# Patient Record
Sex: MALE | Race: WHITE | NOT HISPANIC OR LATINO | URBAN - METROPOLITAN AREA
[De-identification: names, ages, dates, MRNs, and addresses within clinical notes are randomized per-mention and may not be internally consistent; named-entity substitution may affect disease eponyms.]

---
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
## 2019-03-30 ENCOUNTER — TRANSCRIPTION ENCOUNTER (OUTPATIENT)
Age: 19
End: 2019-03-30

## 2019-03-30 ENCOUNTER — INPATIENT (INPATIENT)
Facility: HOSPITAL | Age: 19
LOS: 0 days | Discharge: ANOTHER HOSPITAL | End: 2019-03-30
Attending: STUDENT IN AN ORGANIZED HEALTH CARE EDUCATION/TRAINING PROGRAM | Admitting: STUDENT IN AN ORGANIZED HEALTH CARE EDUCATION/TRAINING PROGRAM

## 2019-03-30 VITALS
HEART RATE: 104 BPM | OXYGEN SATURATION: 95 % | SYSTOLIC BLOOD PRESSURE: 127 MMHG | RESPIRATION RATE: 18 BRPM | DIASTOLIC BLOOD PRESSURE: 77 MMHG

## 2019-03-30 VITALS — TEMPERATURE: 100 F

## 2019-03-30 DIAGNOSIS — R50.9 FEVER, UNSPECIFIED: ICD-10-CM

## 2019-03-30 DIAGNOSIS — Q21.2 ATRIOVENTRICULAR SEPTAL DEFECT: Chronic | ICD-10-CM

## 2019-03-30 DIAGNOSIS — Z95.2 PRESENCE OF PROSTHETIC HEART VALVE: Chronic | ICD-10-CM

## 2019-03-30 LAB
ALBUMIN SERPL ELPH-MCNC: 4.1 G/DL — SIGNIFICANT CHANGE UP (ref 3.5–5.2)
ALP SERPL-CCNC: 85 U/L — SIGNIFICANT CHANGE UP (ref 30–115)
ALT FLD-CCNC: 13 U/L — SIGNIFICANT CHANGE UP (ref 13–38)
ANION GAP SERPL CALC-SCNC: 15 MMOL/L — HIGH (ref 7–14)
AST SERPL-CCNC: 23 U/L — SIGNIFICANT CHANGE UP (ref 13–38)
BASOPHILS # BLD AUTO: 0.07 K/UL — SIGNIFICANT CHANGE UP (ref 0–0.2)
BASOPHILS NFR BLD AUTO: 0.3 % — SIGNIFICANT CHANGE UP (ref 0–1)
BILIRUB SERPL-MCNC: 1.2 MG/DL — SIGNIFICANT CHANGE UP (ref 0.2–1.2)
BUN SERPL-MCNC: 17 MG/DL — SIGNIFICANT CHANGE UP (ref 10–20)
CALCIUM SERPL-MCNC: 8.9 MG/DL — SIGNIFICANT CHANGE UP (ref 8.5–10.1)
CHLORIDE SERPL-SCNC: 101 MMOL/L — SIGNIFICANT CHANGE UP (ref 98–110)
CO2 SERPL-SCNC: 25 MMOL/L — SIGNIFICANT CHANGE UP (ref 17–32)
CREAT SERPL-MCNC: 1 MG/DL — SIGNIFICANT CHANGE UP (ref 0.7–1.5)
EOSINOPHIL # BLD AUTO: 0 K/UL — SIGNIFICANT CHANGE UP (ref 0–0.7)
EOSINOPHIL NFR BLD AUTO: 0 % — SIGNIFICANT CHANGE UP (ref 0–8)
ERYTHROCYTE [SEDIMENTATION RATE] IN BLOOD: 14 MM/HR — HIGH (ref 0–10)
FLU A RESULT: NEGATIVE — SIGNIFICANT CHANGE UP
FLU A RESULT: NEGATIVE — SIGNIFICANT CHANGE UP
FLUAV AG NPH QL: NEGATIVE — SIGNIFICANT CHANGE UP
FLUBV AG NPH QL: NEGATIVE — SIGNIFICANT CHANGE UP
GLUCOSE SERPL-MCNC: 100 MG/DL — HIGH (ref 70–99)
HCT VFR BLD CALC: 44.9 % — SIGNIFICANT CHANGE UP (ref 42–52)
HGB BLD-MCNC: 15.4 G/DL — SIGNIFICANT CHANGE UP (ref 14–18)
IMM GRANULOCYTES NFR BLD AUTO: 1.4 % — HIGH (ref 0.1–0.3)
LACTATE SERPL-SCNC: 1.8 MMOL/L — SIGNIFICANT CHANGE UP (ref 0.5–2.2)
LYMPHOCYTES # BLD AUTO: 0.85 K/UL — LOW (ref 1.2–3.4)
LYMPHOCYTES # BLD AUTO: 3.9 % — LOW (ref 20.5–51.1)
MCHC RBC-ENTMCNC: 31.9 PG — HIGH (ref 27–31)
MCHC RBC-ENTMCNC: 34.3 G/DL — SIGNIFICANT CHANGE UP (ref 32–37)
MCV RBC AUTO: 93 FL — SIGNIFICANT CHANGE UP (ref 80–94)
MONOCYTES # BLD AUTO: 1.22 K/UL — HIGH (ref 0.1–0.6)
MONOCYTES NFR BLD AUTO: 5.6 % — SIGNIFICANT CHANGE UP (ref 1.7–9.3)
NEUTROPHILS # BLD AUTO: 19.33 K/UL — HIGH (ref 1.4–6.5)
NEUTROPHILS NFR BLD AUTO: 88.8 % — HIGH (ref 42.2–75.2)
NRBC # BLD: 0 /100 WBCS — SIGNIFICANT CHANGE UP (ref 0–0)
PLATELET # BLD AUTO: 207 K/UL — SIGNIFICANT CHANGE UP (ref 130–400)
POTASSIUM SERPL-MCNC: 4.3 MMOL/L — SIGNIFICANT CHANGE UP (ref 3.5–5)
POTASSIUM SERPL-SCNC: 4.3 MMOL/L — SIGNIFICANT CHANGE UP (ref 3.5–5)
PROT SERPL-MCNC: 7.2 G/DL — SIGNIFICANT CHANGE UP (ref 6–8)
RBC # BLD: 4.83 M/UL — SIGNIFICANT CHANGE UP (ref 4.7–6.1)
RBC # FLD: 13.6 % — SIGNIFICANT CHANGE UP (ref 11.5–14.5)
RSV RESULT: NEGATIVE — SIGNIFICANT CHANGE UP
RSV RNA RESP QL NAA+PROBE: NEGATIVE — SIGNIFICANT CHANGE UP
SODIUM SERPL-SCNC: 141 MMOL/L — SIGNIFICANT CHANGE UP (ref 135–146)
WBC # BLD: 21.78 K/UL — HIGH (ref 4.8–10.8)
WBC # FLD AUTO: 21.78 K/UL — HIGH (ref 4.8–10.8)

## 2019-03-30 RX ORDER — VANCOMYCIN HCL 1 G
1055 VIAL (EA) INTRAVENOUS EVERY 8 HOURS
Qty: 0 | Refills: 0 | Status: DISCONTINUED | OUTPATIENT
Start: 2019-03-30 | End: 2019-03-30

## 2019-03-30 RX ORDER — MIDAZOLAM HYDROCHLORIDE 1 MG/ML
5 INJECTION, SOLUTION INTRAMUSCULAR; INTRAVENOUS ONCE
Qty: 0 | Refills: 0 | Status: DISCONTINUED | OUTPATIENT
Start: 2019-03-30 | End: 2019-03-30

## 2019-03-30 RX ORDER — GENTAMICIN SULFATE 40 MG/ML
210 VIAL (ML) INJECTION ONCE
Qty: 0 | Refills: 0 | Status: COMPLETED | OUTPATIENT
Start: 2019-03-30 | End: 2019-03-30

## 2019-03-30 RX ORDER — CIPROFLOXACIN LACTATE 400MG/40ML
400 VIAL (ML) INTRAVENOUS EVERY 12 HOURS
Qty: 0 | Refills: 0 | Status: DISCONTINUED | OUTPATIENT
Start: 2019-03-30 | End: 2019-03-30

## 2019-03-30 RX ORDER — SERTRALINE 25 MG/1
0 TABLET, FILM COATED ORAL
Qty: 0 | Refills: 0 | COMMUNITY

## 2019-03-30 RX ORDER — VANCOMYCIN HCL 1 G
2000 VIAL (EA) INTRAVENOUS ONCE
Qty: 0 | Refills: 0 | Status: COMPLETED | OUTPATIENT
Start: 2019-03-30 | End: 2019-03-30

## 2019-03-30 RX ADMIN — Medication 250 MILLIGRAM(S): at 10:40

## 2019-03-30 RX ADMIN — Medication 500 MILLIGRAM(S): at 13:01

## 2019-03-30 RX ADMIN — MIDAZOLAM HYDROCHLORIDE 5 MILLIGRAM(S): 1 INJECTION, SOLUTION INTRAMUSCULAR; INTRAVENOUS at 08:50

## 2019-03-30 RX ADMIN — Medication 200 MILLIGRAM(S): at 09:37

## 2019-03-30 NOTE — DISCHARGE NOTE NURSING/CASE MANAGEMENT/SOCIAL WORK - NSDCDPATPORTLINK_GEN_ALL_CORE
You can access the [facility] Patient Portal, offered by [facility], by registering with the following website: [email or web address]

## 2019-03-30 NOTE — DISCHARGE NOTE PROVIDER - NSDCCPCAREPLAN_GEN_ALL_CORE_FT
PRINCIPAL DISCHARGE DIAGNOSIS  Diagnosis: Fever  Assessment and Plan of Treatment: - Blood cultures have been drawn. Please call [phone] (external lab) for his results. You will need to provide his MRN or account number from [facility].  - Continue antibiotic management of fevers for r/o endocarditis  - His home medications include Coumadin 5mG, Synthroid 75mcG, Zoloft 50mG, and clobetasol topical.  - Please call [phone] if you have questions about his management while at [facility]      SECONDARY DISCHARGE DIAGNOSES  Diagnosis: Cough  Assessment and Plan of Treatment: - RVP was taken and pending. Please call [phone] if you would like to know the results

## 2019-03-30 NOTE — ED PROVIDER NOTE - CLINICAL SUMMARY MEDICAL DECISION MAKING FREE TEXT BOX
Patient to be admitted due to concern for endocarditis.  Inpatient team to follow cultures, bloodwork. Pediatrician defers to hospitalist here.  Patient to be admitted to an inpatient floor. Case discussed with and care endorsed to medical admitting resident. Admitting physician notified.

## 2019-03-30 NOTE — DISCHARGE NOTE PROVIDER - PROVIDER TOKENS
FREE:[LAST:[Helavi],FIRST:[Lee Ann],PHONE:[phone]],FAX:[(   )    -],ADDRESS:[[address] [place] [address]]],FREE:[LAST:[Ector],FIRST:[Fidel],PHONE:[phone]:[(   )    -],ADDRESS:[[address] [place] [address]]]

## 2019-03-30 NOTE — CHART NOTE - NSCHARTNOTEFT_GEN_A_CORE
HPI:  19 year old male with PMH of Trisomy 21, mitral regurgitation, hypothyroidism, eczema, psoriasis, alopecia, and AV cushion defect with AV reconstruction and mitral valve replacement in , presents with fever, dry cough and congestion x 2 days. As per mom, he came home unwell yesterday at 13:00 with congestion and yellow-green discharge. She took his temperature via ear which was 100.8, which resolved with Tylenol. Pt. was febrile to 102.8 that evening and was sweating with a dry cough. He had significantly laboured breathing which his mom states looked similar to his breathing while he was having cardiac problems during reconstruction. The fever was not resolving with Tylenol. He has had decreased PO to food, but is drinking well. No rash apart from psoriatic plaques. No vomiting, no changes in urination. Reports one episode of loose stool. Positive sick contacts. No recent travel.       PMHx: Down's Syndrome, mitral regurgitation, AV cushion defect, hypothyroid, psoriasis, alopecia, eczema  PSHx: AV reconstruction x 3, mitral valve replacement  Meds: Coumadin, Zyrtec, Synthroid  All: none  FHx: mother has mitral valve prolapse, father has HTN  SHx: mother, father, sister, 4 pugs, no smokers  BHx: FT, , NICU x 5d, was intubated  Dev: Delayed  Vacc: UTD with flu  PMD: Dr. Lee Ann Becerra      VITAL SIGNS   T(C): 37.8 (19 @ 14:16), Max: 37.9 (19 @ 12:53)  HR: 103 (19 @ 11:45) (103 - 104)  BP: 114/55 (19 @ 11:45) (114/55 - 127/77)  RR: 24 (19 @ 11:45) (18 - 24)  SpO2: 97% (19 @ 11:45) (95% - 97%)      PHYSICAL EXAM:  Weight (kg): 70.3 ( @ 06:49)  General: in no acute distress   Eyes: EOM intact; conjunctiva and sclera clear  Head: Normocephalic; atraumatic  ENMT: External ear normal, tympanic membranes intact, no nasal discharge; airway clear, oropharynx clear, tonsils  Neck: Supple; non tender; No cervical adenopathy  Respiratory: normal respiratory pattern, clear to auscultation bilaterally, no signs of increased work of breathing  Cardiovascular: Regular rate and rhythm. S1 and S2 Normal; No murmurs  Abdominal: Soft non-tender non-distended; normal bowel sounds; no hepatosplenomegaly; no masses  Extremities: Full range of motion, no tenderness, no cyanosis or edema  Neurological: Grossly intact  Skin: Warm and dry. No acute rash      LABS:                        15.4   21.78 )-----------( 207      ( 30 Mar 2019 09:05 )             44.9           141  |  101  |  17  ----------------------------<  100<H>  4.3   |  25  |  1.0    Ca    8.9      30 Mar 2019 09:05    TPro  7.2  /  Alb  4.1  /  TBili  1.2  /  DBili  x   /  AST  23  /  ALT  13  /  AlkPhos  85      Cultures:   blood cx x 3 pending         RADIOLOGY & ADDITIONAL STUDIES:    < from: Xray Chest 2 Views PA/Lat (19 @ 07:43) >    Impression:      Bilateral perihilar opacity. No pleural effusion or air leak    < end of copied text >        A&P:    19 year old male with PMH of Trisomy 21, mitral regurgitation, hypothyroidism, eczema, psoriasis, alopecia, and AV cushion defect with AV reconstruction and mitral valve replacement in , presents with fever, dry cough and congestion x 2 days. Admitted for r/o endocarditits    PLAN    RESP:  room air     CARDIO:  cardio consult   echo     FENGI:  regular diet     ID:  ID consult   gentamycin  vancomycin   ciprofloxacin   Tylenol for fever, PRN

## 2019-03-30 NOTE — ED PEDIATRIC NURSE NOTE - OBJECTIVE STATEMENT
Pt presents to ED w/ a fever (tmax 102 @ home) and nasal congestion. Pt has hx of open heart surgery as a child. Pt is well appearing. Mom gave tylenol at 0430. Denies cough/earache/sore throat.

## 2019-03-30 NOTE — ED PROVIDER NOTE - ATTENDING CONTRIBUTION TO CARE
in brief, pertinent + - :  19 y M pmh MR, mitral valve replacement on coumadin, hypothyroidism, psoriasis, alopecia, cc fever x 1 day associated with copious rhinorrhea and intermittent dry cough   of note, pt had a URI type illness > 3 weeks ago pt was placed on amoxicillin that he finished; now with fever again but did not have fever or URI in interim (resolved URI, only lasted a few days)  no skin changes to hands or feet  ros: see resident note for additional to pertinent associated symptoms listed above  past med and surg: none pertinent  Fh: none pertinent  Soc: none pertinent, see resident note    Vital Signs: I have reviewed the initial vital signs, slightly tachycardic  Constitutional: well-nourished, appears stated age, no acute distress  HEENT: throat with no exudates, non erythematous, dry clear discharge both nares, moist mucous membranes, normal TMs visualized but with dry wax occluding partial view in both TMs  Cardiovascular: regular rate, systolic click, regular rhythm, well-perfused extremities  Respiratory: unlabored respiratory effort, clear to auscultation bilaterally, scattered rhonci  Gastrointestinal: soft, non-tender abdomen, no palpable organomegaly  Musculoskeletal: supple neck, no gross deformities  Integumentary: warm, dry, no rash; no signs of osler nodes or nail changes, or other skin changes/new rashes to hands or feet   Neurologic: awake, alert, normal tone, moving all extremities   A/P:  I have considered many differentials for this case, including viral URI, pneumonia chief concerns  considered endocarditis but fever x 1 day, with source, no murmur, no skin changes  will speak to pediatrician, since follows patient closely  plan for probable discharge with strict return precautions for parents re: endocarditis (fever not resolving, patient worsening) in brief, pertinent + - :  19 y M pmh MR, mitral valve replacement on coumadin, hypothyroidism, psoriasis, alopecia, cc fever x 1 day associated with copious rhinorrhea and intermittent dry cough   of note, pt had a URI type illness > 3 weeks ago pt was placed on amoxicillin that he finished; now with fever again but did not have fever or URI in interim (resolved URI, only lasted a few days)  no skin changes to hands or feet  ros: see resident note for additional to pertinent associated symptoms listed above  past med and surg: none pertinent  Fh: none pertinent  Soc: none pertinent, see resident note    Vital Signs: I have reviewed the initial vital signs, slightly tachycardic  Constitutional: well-nourished, appears stated age, no acute distress  HEENT: throat with no exudates, non erythematous, dry clear discharge both nares, moist mucous membranes, normal TMs visualized but with dry wax occluding partial view in both TMs  Cardiovascular: regular rate, systolic click, regular rhythm, well-perfused extremities  Respiratory: unlabored respiratory effort, clear to auscultation bilaterally, scattered rhonci  Gastrointestinal: soft, non-tender abdomen, no palpable organomegaly  Musculoskeletal: supple neck, no gross deformities  Integumentary: warm, dry, no rash; no signs of osler nodes or [place], or nail changes, or other skin changes/new rashes to hands or feet   Neurologic: awake, alert, normal tone, moving all extremities   A/P:  I have considered many differentials for this case, including viral URI, pneumonia chief concerns  considered endocarditis but fever x 1 day, with source, no murmur, no skin changes  will get CXR, re assess  viral swabs  speak to pediatrician

## 2019-03-30 NOTE — H&P PEDIATRIC - NSICDXPASTSURGICALHX_GEN_ALL_CORE_FT
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

## 2019-03-30 NOTE — DISCHARGE NOTE NURSING/CASE MANAGEMENT/SOCIAL WORK - NSDCPEPTCOWAR_GEN_ALL_CORE
Coumadin/Warfarin - Follow up monitoring/Coumadin/Warfarin - Potential for adverse drug reactions and interactions/Coumadin/Warfarin - Compliance/Coumadin/Warfarin - Dietary Advice

## 2019-03-30 NOTE — ED PEDIATRIC NURSE NOTE - NSIMPLEMENTINTERV_GEN_ALL_ED
Implemented All Universal Safety Interventions:  [place] to call system. Call bell, personal items and telephone within reach. Instruct patient to call for assistance. Room bathroom lighting operational. Non-slip footwear when patient is off stretcher. Physically safe environment: no spills, clutter or unnecessary equipment. Stretcher in lowest position, wheels locked, appropriate side rails in place.

## 2019-03-30 NOTE — ED PROVIDER NOTE - PHYSICAL EXAMINATION
CONSTITUTIONAL: WA / WN / NAD  HEAD: NCAT  EYES: PERRL ;conjunctivae without injection, drainage or discharge  ENT: Normal pharynx; mucous membranes pink/moist, no erythema.Tympanic membranes pearly gray with normal landmarks;+ yellow mucous from the nose; mouth moist without ulcerations or lesions; throat moist without erythema, exudate, ulcerations or lesions  NECK: Supple; no meningeal signs  CARD: RRR; nl S1/S2;   RESP: Respiratory rate and effort are normal; breath sounds clear and equal bilaterally.  ABD: Soft, NT ND   MSK/EXT: No gross deformities; full range of motion.  SKIN: normal skin color for age and race, well-perfused; warm and dry

## 2019-03-30 NOTE — ED PROVIDER NOTE - OBJECTIVE STATEMENT
19 year old male with a pmh of psoriasis (no longer on immunomodulators since 1 month), hypothyroid, alopecia, h/o 4 open heart surgeries last one was in 2004 in which he had an artifical mitral valve placed presents here for fevers since yesterday tmax of 102. Last given tynenol @ 4:30am. Fever associated with nasal congestion and cough. No headache, n/v/abdominal pain, throat pain or diarrhea. Patient tolerating po.

## 2019-03-30 NOTE — ED PROVIDER NOTE - NS ED ROS FT
Constitutional: See HPI  Eyes: No drainage from eyes.  ENT: + nasal congestion    Neck: No neck stiffness.  Cardiovascular: No Chest pain  Pulmonary: + cough  Abdominal: No nausea, vomiting, diarrhea or abdominal pain.  MS: No myalgia, muscle weakness, joint pain or back pain.  Skin:  h/o psoriasis

## 2019-03-30 NOTE — DISCHARGE NOTE PROVIDER - HOSPITAL COURSE
HPI: Howie is a 19 year old male with past medical history of Down's syndrome, mitral regurgitation, hypothyroidism, eczema, psoriasis, alopecia, and AV cushion defect with AV reconstruction and mitral valve replacement. Per mother, he came home unwell yesterday at 13:00 with congestion and yellow-green discharge. She took his temperature via ear which was 100.8, which resolved with Tylenol. That night, he was febrile to 102.8 and was sweating with a dry cough. He had significantly laboured breathing which his mother likened to his breathing while he was having cardiac problems during reconstruction. The fever was not resolving with Tylenol. He has had decreased PO to food, but is drinking well. One episode of loose stool. No rash apart from psoriatic plaques, no changes in urination. Many sick contacts as school. No recent travel.        PMHx: Down's Syndrome, mitral regurgitation, AV cushion defect, hypothyroid, psoriasis, alopecia, eczema    PSHx: AV reconstruction x 3, mitral valve replacement    Meds: Coumadin 5mG, Zyrtec, Synthroid 75mcG, Zoloft 50mG, Topical clobetasol 0.5%    All: none    FHx: mother has mitral valve prolapse, father has HTN    SHx: mother, father, sister, 4 pugs, no smokers    BHx: FT, , NICU x 5d, was intubated    Dev: Delayed    Vacc: UTD with flu    PMD: Dr. Lee Ann Becerra        ED Course: Blood cultures x 2, CRP, Throat Cx, RVP and rapid flu, CBC, CMP, lactate, ESR, CXR, ECG        Vitals:    T(C): 37.8 (30 Mar 2019 14:16), Max: 37.9 (30 Mar 2019 12:53)    T(F): 100 (30 Mar 2019 14:16), Max: 100.2 (30 Mar 2019 12:53)    HR: 103 (30 Mar 2019 11:45) (103 - 104)    BP: 114/55 (30 Mar 2019 11:45) (114/55 - 127/77)    RR: 24 (30 Mar 2019 11:45) (18 - 24)    SpO2: 97% (30 Mar 2019 11:45) (95% - 97%)        Hospital Course: While admitted, Howie received gentamicin x 1, vancomycin x 1, and ciprofloxacin x 1. He was kept on his home medications. Due to his extensive cardiac history with onset of febrile illness, cardiac origin cannot be ruled out. Transfer to [facility] was initiated in order to obtain care in a facility where he is known with in-house cardiac care. Transfer was accepted by Dr. Cyndee Jameson at [facility] to their PICU.        Lab Results:    FLU A B RSV Detection by PCR (19 @ 09:15)    	  Flu A Result: Negative:    	  Flu B Result: Negative:    	  RSV Result: Negative:         	Lactate, Blood (19 @ 09:05)    	  Lactate, Blood: 1.8 mmol/L        	Sedimentation Rate, Erythrocyte (19 @ 09:05)    	  Sedimentation Rate, Erythrocyte: 14 mm/Hr        	Comprehensive Metabolic Panel (19 @ 09:05)    	  Sodium, Serum: 141 mmol/L    	  Potassium, Serum: 4.3: Slighty Hemolyzed use with Caution mmol/L    	  Chloride, Serum: 101 mmol/L    	  Carbon Dioxide, Serum: 25 mmol/L    	  Anion Gap, Serum: 15 mmol/L    	  Blood Urea Nitrogen, Serum: 17 mg/dL    	  Creatinine, Serum: 1.0 mg/dL    	  Glucose, Serum: 100 mg/dL    	  Calcium, Total Serum: 8.9 mg/dL    	  Protein Total, Serum: 7.2 g/dL    	  Albumin, Serum: 4.1 g/dL    	  Bilirubin Total, Serum: 1.2 mg/dL    	  Alkaline Phosphatase, Serum: 85 U/L    	  Aspartate Aminotransferase (AST/SGOT): 23: Hemolyzed. Interpret with caution U/L    	  Alanine Aminotransferase (ALT/SGPT): 13 U/L    	  eGFR if Non : 108:        	Complete Blood Count + Automated Diff (19 @ 09:05)    	  WBC Count: 21.78 K/uL    	  RBC Count: 4.83 M/uL    	  Hemoglobin: 15.4 g/dL    	  Hematocrit: 44.9 %    	  Mean Cell Volume: 93.0 fL    	  Mean Cell Hemoglobin: 31.9 pg    	  Mean Cell Hemoglobin Conc: 34.3 g/dL    	  Red Cell Distrib Width: 13.6 %    	  Platelet Count - Automated: 207 K/uL    	  Auto Neutrophil #: 19.33 K/uL    	  Auto Lymphocyte #: 0.85 K/uL    	  Auto Monocyte #: 1.22 K/uL    	  Auto Eosinophil #: 0.00 K/uL    	  Auto Basophil #: 0.07 K/uL    	  Auto Neutrophil %: 88.8: Differential percentages must be correlated with absolute numbers for clinical significance. %    	  Auto Lymphocyte %: 3.9 %    	  Auto Monocyte %: 5.6 %    	  Auto Eosinophil %: 0.0 %    	  Auto Basophil %: 0.3 %    	  Auto Immature Granulocyte %: 1.4 %    	  Nucleated RBC: 0 /100 WBCs        	< from: Xray Chest 2 Views PA/Lat (19 @ 07:43) >    	Bilateral perihilar opacity. No pleural effusion or air leak    < end of copied text >        Pending Results:    Blood culture x 2 - call [phone]    RVP - call [phone]    Throat culture for Group A Strep - call [phone]    CRP HPI: Howie is a 19 year old male with past medical history of Down's syndrome, mitral regurgitation, hypothyroidism, eczema, psoriasis, alopecia, and AV cushion defect with AV reconstruction and mitral valve replacement. Per mother, he came home unwell yesterday at 13:00 with congestion and yellow-green discharge. She took his temperature via ear which was 100.8, which resolved with Tylenol. That night, he was febrile to 102.8 and was sweating with a dry cough. He had significantly laboured breathing which his mother likened to his breathing while he was having cardiac problems during reconstruction. The fever was not resolving with Tylenol. He has had decreased PO to food, but is drinking well. One episode of loose stool. No rash apart from psoriatic plaques, no changes in urination. Many sick contacts as school. No recent travel.        PMHx: Down's Syndrome, mitral regurgitation, AV cushion defect, hypothyroid, psoriasis, alopecia, eczema    PSHx: AV reconstruction x 3, mitral valve replacement    Meds: Coumadin 5mG, Zyrtec, Synthroid 75mcG, Zoloft 50mG, Topical clobetasol 0.5%    All: none    FHx: mother has mitral valve prolapse, father has HTN    SHx: mother, father, sister, 4 pugs, no smokers    BHx: FT, , NICU x 5d, was intubated    Dev: Delayed    Vacc: UTD with flu    PMD: Dr. Lee Ann Becerra        ED Course: Blood cultures x 2, CRP, Throat Cx, RVP and rapid flu, CBC, CMP, lactate, ESR, CXR, ECG        Vitals:    T(C): 37.8 (30 Mar 2019 14:16), Max: 37.9 (30 Mar 2019 12:53)    T(F): 100 (30 Mar 2019 14:16), Max: 100.2 (30 Mar 2019 12:53)    HR: 103 (30 Mar 2019 11:45) (103 - 104)    BP: 114/55 (30 Mar 2019 11:45) (114/55 - 127/77)    RR: 24 (30 Mar 2019 11:45) (18 - 24)    SpO2: 97% (30 Mar 2019 11:45) (95% - 97%)        Hospital Course: While admitted, Howie received gentamicin x 1, vancomycin x 1, and ciprofloxacin x 1. He was kept on his home medications. Due to his extensive cardiac history with onset of febrile illness, cardiac origin cannot be ruled out. Transfer via ambulance to [facility] was initiated in order to obtain care in a facility where he is known with in-house cardiac care. Transfer was accepted by Dr. Cyndee Jamesno at [facility] to their PICU.        Lab Results:    FLU A B RSV Detection by PCR (19 @ 09:15)    	  Flu A Result: Negative:    	  Flu B Result: Negative:    	  RSV Result: Negative:         	Lactate, Blood (19 @ 09:05)    	  Lactate, Blood: 1.8 mmol/L        	Sedimentation Rate, Erythrocyte (19 @ 09:05)    	  Sedimentation Rate, Erythrocyte: 14 mm/Hr        	Comprehensive Metabolic Panel (19 @ 09:05)    	  Sodium, Serum: 141 mmol/L    	  Potassium, Serum: 4.3: Slighty Hemolyzed use with Caution mmol/L    	  Chloride, Serum: 101 mmol/L    	  Carbon Dioxide, Serum: 25 mmol/L    	  Anion Gap, Serum: 15 mmol/L    	  Blood Urea Nitrogen, Serum: 17 mg/dL    	  Creatinine, Serum: 1.0 mg/dL    	  Glucose, Serum: 100 mg/dL    	  Calcium, Total Serum: 8.9 mg/dL    	  Protein Total, Serum: 7.2 g/dL    	  Albumin, Serum: 4.1 g/dL    	  Bilirubin Total, Serum: 1.2 mg/dL    	  Alkaline Phosphatase, Serum: 85 U/L    	  Aspartate Aminotransferase (AST/SGOT): 23: Hemolyzed. Interpret with caution U/L    	  Alanine Aminotransferase (ALT/SGPT): 13 U/L    	  eGFR if Non : 108:        	Complete Blood Count + Automated Diff (19 @ 09:05)    	  WBC Count: 21.78 K/uL    	  RBC Count: 4.83 M/uL    	  Hemoglobin: 15.4 g/dL    	  Hematocrit: 44.9 %    	  Mean Cell Volume: 93.0 fL    	  Mean Cell Hemoglobin: 31.9 pg    	  Mean Cell Hemoglobin Conc: 34.3 g/dL    	  Red Cell Distrib Width: 13.6 %    	  Platelet Count - Automated: 207 K/uL    	  Auto Neutrophil #: 19.33 K/uL    	  Auto Lymphocyte #: 0.85 K/uL    	  Auto Monocyte #: 1.22 K/uL    	  Auto Eosinophil #: 0.00 K/uL    	  Auto Basophil #: 0.07 K/uL    	  Auto Neutrophil %: 88.8: Differential percentages must be correlated with absolute numbers for clinical significance. %    	  Auto Lymphocyte %: 3.9 %    	  Auto Monocyte %: 5.6 %    	  Auto Eosinophil %: 0.0 %    	  Auto Basophil %: 0.3 %    	  Auto Immature Granulocyte %: 1.4 %    	  Nucleated RBC: 0 /100 WBCs        	< from: Xray Chest 2 Views PA/Lat (19 @ 07:43) >    	Bilateral perihilar opacity. No pleural effusion or air leak    < end of copied text >        Pending Results:    Blood culture x 2 - call [phone]    RVP - call [phone]    Throat culture for Group A Strep - call [phone]    CRP

## 2019-03-30 NOTE — H&P PEDIATRIC - NSHPREVIEWOFSYSTEMS_GEN_ALL_CORE
GEN: (+) fever, (-) abnormal activity  HEENT: (+) runny nose, (-) ear pain, eye discharge, sore throat, headaches  NECK: denies neck pain  HEART: denies chest pain, palpitations, difficulty exercise  LUNGS: (+) dry cough, (-) wheeze, or SOB  ABDOM: denies abdominal pain, N/V/D/C  SKIN: denies new rashes or lesions  : denies difficulty urinating, decreased urine output

## 2019-03-30 NOTE — ED PROVIDER NOTE - PROGRESS NOTE DETAILS
spoke to pediatrician, Dr. Escalante, who says patient was placed on amox 3 weeks ago because he wasn't sure about the source, but patient had a URI type illness; requests CXR and viral swabs, not bloodwork if not indicated at this time patient's XR with bilateral infiltrates vs previous; will do endocarditis workup, IV ABX, admission patient given 5 versed IM for anxiolysis due to refusal to sit still for bloodwork attempts; pediatrician now at bedside, had previously been updated on CXR concern and current workup patient to be admitted, WBC high, bloodwork successful post anxiolysis

## 2019-03-30 NOTE — H&P PEDIATRIC - NSHPPHYSICALEXAM_GEN_ALL_CORE
General: Well developed; well nourished; in no acute distress    Eyes: PERRLA, EOM intact  Head: Normocephalic; atraumatic, alopecia  ENMT: External ear normal, tympanic membranes intact, nasal mucosa normal, no nasal discharge; airway clear, oropharynx clear  Neck: Supple; non tender; No cervical adenopathy  Respiratory: No chest wall deformity, normal respiratory pattern, clear to auscultation bilaterally  Cardiovascular: Regular rate and rhythm. S1 and S2 Normal; No murmurs, gallops or rubs  Abdominal: Soft non-tender non-distended; normal bowel sounds; no hepatosplenomegaly; no masses  Genitourinary: No costovertebral angle tenderness. Normal external genitalia for age  Rectal: No masses or lesions  Extremities: Full range of motion, no tenderness, no cyanosis or edema  Vascular: Upper and lower peripheral pulses palpable 2+ bilaterally  Neurological: Alert, affect appropriate, no acute change from baseline  Skin: Warm and dry. No acute rash, no subcutaneous nodules  Lymph Nodes: No  adenopathy General: Well developed; well nourished; in no acute distress    Eyes: PERRLA, EOM intact  Head: Normocephalic; atraumatic, alopecia  ENMT: External ear normal, copious green-yellow nasal discharge, tonsils 3+ to 4+, erythematous oropharynx  Neck: Supple; non tender; No cervical adenopathy  Respiratory: No chest wall deformity, normal respiratory pattern, clear to auscultation bilaterally, decreased breath sounds bilateral bases, mouth breathing  Cardiovascular: Regular rate and rhythm. S1 and S2 Normal; No murmurs, gallops or rubs.  Abdominal: Soft non-tender non-distended; normal bowel sounds; no hepatosplenomegaly; no masses  Extremities: Full range of motion, no tenderness, no cyanosis or edema  Vascular: Upper and lower peripheral pulses palpable 2+ bilaterally  Neurological: Alert, affect appropriate, no acute change from baseline. Minimally verbal.  Skin: Scar present over sternum and under right breast. Psoriatic plaques over both arms and legs and under sternum.  Lymph Nodes: No adenopathy

## 2019-03-30 NOTE — PATIENT PROFILE PEDIATRIC. - TOILETING, PEDS PROFILE
I spoke with Alena.  She said they were told in the walk in, if not starting to scab over on Friday they should see Dr. Dejesus or wound care.  Dr. Dejesus is out today and Monday.  I discussed with Angela Larry NP who evaluated Matry at the walk in.  She was okay with the wound care referral and said the culture is still pending.  I called Alena back and informed her.  She verbalized understanding.  Referral entered per Angela Larry.   
Patient's wife (Annie) called. Patient went to Urgent care for open wounds. Was directed that if wounds didn't start to heal, to then either go to Dr. Or wound care. Please call patient or wife with next steps.   
toilet

## 2019-03-30 NOTE — PATIENT PROFILE PEDIATRIC. - SAFETY PRACTICES, PEDS PROFILE
smoke alarms work in home/bicycle/scooter protective equipment (helmets/pads)/firearms out of reach, ammunition removed, locked/bean by stairs/poisons/medications out of reach/seat belt/water safety/emergency numbers

## 2019-03-30 NOTE — H&P PEDIATRIC - NSICDXPASTMEDICALHX_GEN_ALL_CORE_FT
PAST MEDICAL HISTORY:  Alopecia     Down syndrome     Hypothyroid     Mitral valve replaced     Psoriasis

## 2019-03-30 NOTE — H&P PEDIATRIC - NSHPLABSRESULTS_GEN_ALL_CORE
FLU A B RSV Detection by PCR (03.30.19 @ 09:15)    Flu A Result: Negative:    Flu B Result: Negative:    RSV Result: Negative:     Lactate, Blood (03.30.19 @ 09:05)    Lactate, Blood: 1.8 mmol/L    Sedimentation Rate, Erythrocyte (03.30.19 @ 09:05)    Sedimentation Rate, Erythrocyte: 14 mm/Hr    Comprehensive Metabolic Panel (03.30.19 @ 09:05)    Sodium, Serum: 141 mmol/L    Potassium, Serum: 4.3: Slighty Hemolyzed use with Caution mmol/L    Chloride, Serum: 101 mmol/L    Carbon Dioxide, Serum: 25 mmol/L    Anion Gap, Serum: 15 mmol/L    Blood Urea Nitrogen, Serum: 17 mg/dL    Creatinine, Serum: 1.0 mg/dL    Glucose, Serum: 100 mg/dL    Calcium, Total Serum: 8.9 mg/dL    Protein Total, Serum: 7.2 g/dL    Albumin, Serum: 4.1 g/dL    Bilirubin Total, Serum: 1.2 mg/dL    Alkaline Phosphatase, Serum: 85 U/L    Aspartate Aminotransferase (AST/SGOT): 23: Hemolyzed. Interpret with caution U/L    Alanine Aminotransferase (ALT/SGPT): 13 U/L    eGFR if Non : 108:    Complete Blood Count + Automated Diff (03.30.19 @ 09:05)    WBC Count: 21.78 K/uL    RBC Count: 4.83 M/uL    Hemoglobin: 15.4 g/dL    Hematocrit: 44.9 %    Mean Cell Volume: 93.0 fL    Mean Cell Hemoglobin: 31.9 pg    Mean Cell Hemoglobin Conc: 34.3 g/dL    Red Cell Distrib Width: 13.6 %    Platelet Count - Automated: 207 K/uL    Auto Neutrophil #: 19.33 K/uL    Auto Lymphocyte #: 0.85 K/uL    Auto Monocyte #: 1.22 K/uL    Auto Eosinophil #: 0.00 K/uL    Auto Basophil #: 0.07 K/uL    Auto Neutrophil %: 88.8: Differential percentages must be correlated with absolute numbers for clinical significance. %    Auto Lymphocyte %: 3.9 %    Auto Monocyte %: 5.6 %    Auto Eosinophil %: 0.0 %    Auto Basophil %: 0.3 %    Auto Immature Granulocyte %: 1.4 %    Nucleated RBC: 0 /100 WBCs    < from: Xray Chest 2 Views PA/Lat (03.30.19 @ 07:43) >  Bilateral perihilar opacity. No pleural effusion or air leak  < end of copied text >

## 2019-03-30 NOTE — H&P PEDIATRIC - HISTORY OF PRESENT ILLNESS
Howie is a 19 year old male with past medical history of Down's syndrome, mitral regurgitation, hypothyroidism, eczema, psoriasis, alopecia, and AV cushion defect with AV reconstruction and mitral valve replacement. Per mother, he came home unwell yesterday at 13:00 with congestion and yellow-green discharge. She took his temperature via ear which was 100.8, which resolved with Tylenol. That night, he was febrile to 102.8 and was sweating with a dry cough. He had significantly laboured breathing which his mother likened to his breathing while he was having cardiac problems during reconstruction. The fever was not resolving with Tylenol. He has had decreased PO to food, but is drinking well. No rash apart from psoriatic plaques, Howie is a 19 year old male with past medical history of Down's syndrome, mitral regurgitation, hypothyroidism, eczema, psoriasis, alopecia, and AV cushion defect with AV reconstruction and mitral valve replacement. Per mother, he came home unwell yesterday at 13:00 with congestion and yellow-green discharge. She took his temperature via ear which was 100.8, which resolved with Tylenol. That night, he was febrile to 102.8 and was sweating with a dry cough. He had significantly laboured breathing which his mother likened to his breathing while he was having cardiac problems during reconstruction. The fever was not resolving with Tylenol. He has had decreased PO to food, but is drinking well. One episode of loose stool. No rash apart from psoriatic plaques, no changes in urination. Many sick contacts as school. No recent travel.    PMHx: Down's Syndrome, mitral regurgitation, AV cushion defect, hypothyroid, psoriasis, alopecia, eczema  PSHx: AV reconstruction x 3, mitral valve replacement  Meds: Coumadin, Zyrtec, Synthroid  All: none  FHx: mother has mitral valve prolapse, father has HTN  SHx: mother, father, sister, 4 pugs, no smokers  BHx: FT, , NICU x 5d, was intubated  Dev: Delayed  Vacc: UTD with flu  PMD: Dr. Lee Ann Becerra

## 2019-03-30 NOTE — H&P PEDIATRIC - ASSESSMENT
Howie is a 19 year old male with past medical history of Down's syndrome, mitral regurgitation, hypothyroidism, eczema, psoriasis, alopecia, and AV cushion defect with AV reconstruction and mitral valve replacement. Due to extensive cardiac history with current febrile illness, significant respiratory symptoms, and bilateral opacities on chest x-ray, must r/o endocarditis vs pneumonia vs viral illness.    Plan:  Resp:  - Room air     Cardiac:  - Cardio consult   - Echo     FENGI:  - Regular diet     ID:  - ID consult   - Gentamycin  - Vancomycin   - S/p Ciprofloxacin, change to Meropenem for better coverage   - Tylenol for fever, PRN.

## 2019-03-30 NOTE — DISCHARGE NOTE PROVIDER - CARE PROVIDER_API CALL
Lee Ann Becerra  [address] [place] [address]  Phone: [phone]  Fax: (   )    -  Follow Up Time:     Fidel Barney    [address]   [place] [address]  Phone: [phone]  Fax: (   )    -  Follow Up Time:

## 2019-03-31 LAB
CRP SERPL-MCNC: 14.9 MG/DL — HIGH (ref 0–0.4)
CULTURE RESULTS: SIGNIFICANT CHANGE UP
RAPID RVP RESULT: SIGNIFICANT CHANGE UP
SPECIMEN SOURCE: SIGNIFICANT CHANGE UP

## 2019-04-01 NOTE — CHART NOTE - NSCHARTNOTEFT_GEN_A_CORE
I received email notice from lab of positive Group A strep throat culture. Blood culture with no growth. I notified [facility] PICU, Nursery Kaylyn, who participating in Howie's care today, I discussed the throat culture results and reviewed the other labs, such as CRP 14.9, negative viral studies, no growth blood culture and timing thus far. I provided our micro lab number as well in case any other direct communication with our lab is needed on their part. I discussed with Nurse Kaylyn that if I receive any other clinically significant information I will call, and will not call for any new negative or non significant updates from lab. She agreed.

## 2019-04-04 LAB
CULTURE RESULTS: SIGNIFICANT CHANGE UP
CULTURE RESULTS: SIGNIFICANT CHANGE UP
SPECIMEN SOURCE: SIGNIFICANT CHANGE UP
SPECIMEN SOURCE: SIGNIFICANT CHANGE UP

## 2019-04-12 DIAGNOSIS — Q90.2 TRISOMY 21, TRANSLOCATION: ICD-10-CM

## 2019-04-12 DIAGNOSIS — R50.9 FEVER, UNSPECIFIED: ICD-10-CM

## 2019-04-12 DIAGNOSIS — Z79.01 LONG TERM (CURRENT) USE OF ANTICOAGULANTS: ICD-10-CM

## 2019-04-12 DIAGNOSIS — Q90.9 DOWN SYNDROME, UNSPECIFIED: ICD-10-CM

## 2019-04-12 DIAGNOSIS — B95.0 STREPTOCOCCUS, GROUP A, AS THE CAUSE OF DISEASES CLASSIFIED ELSEWHERE: ICD-10-CM

## 2019-04-12 DIAGNOSIS — Z95.2 PRESENCE OF PROSTHETIC HEART VALVE: ICD-10-CM

## 2019-04-12 DIAGNOSIS — R05 COUGH: ICD-10-CM

## 2019-04-12 DIAGNOSIS — L40.9 PSORIASIS, UNSPECIFIED: ICD-10-CM

## 2019-04-12 DIAGNOSIS — R09.81 NASAL CONGESTION: ICD-10-CM

## 2019-04-12 DIAGNOSIS — L65.9 NONSCARRING HAIR LOSS, UNSPECIFIED: ICD-10-CM

## 2019-04-12 DIAGNOSIS — E03.9 HYPOTHYROIDISM, UNSPECIFIED: ICD-10-CM

## 2019-04-12 DIAGNOSIS — J02.0 STREPTOCOCCAL PHARYNGITIS: ICD-10-CM

## 2019-04-12 DIAGNOSIS — Q21.2 ATRIOVENTRICULAR SEPTAL DEFECT: ICD-10-CM

## 2019-05-09 PROBLEM — L65.9 NONSCARRING HAIR LOSS, UNSPECIFIED: Chronic | Status: ACTIVE | Noted: 2019-03-30

## 2019-05-09 PROBLEM — E03.9 HYPOTHYROIDISM, UNSPECIFIED: Chronic | Status: ACTIVE | Noted: 2019-03-30

## 2019-05-09 PROBLEM — L40.9 PSORIASIS, UNSPECIFIED: Chronic | Status: ACTIVE | Noted: 2019-03-30

## 2019-05-09 PROBLEM — Q90.9 DOWN SYNDROME, UNSPECIFIED: Chronic | Status: ACTIVE | Noted: 2019-03-30

## 2019-05-09 PROBLEM — Z95.2 PRESENCE OF PROSTHETIC HEART VALVE: Chronic | Status: ACTIVE | Noted: 2019-03-30

## 2019-05-17 ENCOUNTER — RECORD ABSTRACTING (OUTPATIENT)
Age: 19
End: 2019-05-17

## 2019-05-17 DIAGNOSIS — Z55.9 PROBLEMS RELATED TO EDUCATION AND LITERACY, UNSPECIFIED: ICD-10-CM

## 2019-05-17 DIAGNOSIS — L40.9 PSORIASIS, UNSPECIFIED: ICD-10-CM

## 2019-05-17 DIAGNOSIS — L65.9 NONSCARRING HAIR LOSS, UNSPECIFIED: ICD-10-CM

## 2019-05-17 PROBLEM — Z00.00 ENCOUNTER FOR PREVENTIVE HEALTH EXAMINATION: Status: ACTIVE | Noted: 2019-05-17

## 2019-05-17 RX ORDER — WARFARIN SODIUM 2 MG/ML
5 INJECTION, POWDER, LYOPHILIZED, FOR SOLUTION INTRAVENOUS
Refills: 0 | Status: ACTIVE | COMMUNITY

## 2019-05-17 RX ORDER — CHOLECALCIFEROL (VITAMIN D3) 50 MCG
50 MCG TABLET ORAL
Refills: 0 | Status: ACTIVE | COMMUNITY

## 2019-05-17 RX ORDER — SERTRALINE HYDROCHLORIDE 25 MG/1
25 TABLET, FILM COATED ORAL
Refills: 0 | Status: ACTIVE | COMMUNITY

## 2019-05-17 SDOH — EDUCATIONAL SECURITY - EDUCATION ATTAINMENT: PROBLEMS RELATED TO EDUCATION AND LITERACY, UNSPECIFIED: Z55.9

## 2019-06-18 ENCOUNTER — OTHER (OUTPATIENT)
Age: 19
End: 2019-06-18

## 2019-07-16 ENCOUNTER — APPOINTMENT (OUTPATIENT)
Dept: PEDIATRIC ENDOCRINOLOGY | Facility: CLINIC | Age: 19
End: 2019-07-16
Payer: COMMERCIAL

## 2019-07-16 VITALS — SYSTOLIC BLOOD PRESSURE: 106 MMHG | HEART RATE: 96 BPM | DIASTOLIC BLOOD PRESSURE: 78 MMHG | WEIGHT: 161 LBS

## 2019-07-16 VITALS — WEIGHT: 185.39 LBS | BODY MASS INDEX: 33.26 KG/M2 | HEIGHT: 62.52 IN

## 2019-07-16 DIAGNOSIS — E03.9 HYPOTHYROIDISM, UNSPECIFIED: ICD-10-CM

## 2019-07-16 DIAGNOSIS — G47.33 OBSTRUCTIVE SLEEP APNEA (ADULT) (PEDIATRIC): ICD-10-CM

## 2019-07-16 DIAGNOSIS — L40.0 PSORIASIS VULGARIS: ICD-10-CM

## 2019-07-16 DIAGNOSIS — L63.0 ALOPECIA (CAPITIS) TOTALIS: ICD-10-CM

## 2019-07-16 DIAGNOSIS — E55.9 VITAMIN D DEFICIENCY, UNSPECIFIED: ICD-10-CM

## 2019-07-16 DIAGNOSIS — Q90.9 DOWN SYNDROME, UNSPECIFIED: ICD-10-CM

## 2019-07-16 DIAGNOSIS — Z87.74 PERSONAL HISTORY OF (CORRECTED) CONGENITAL MALFORMATIONS OF HEART AND CIRCULATORY SYSTEM: ICD-10-CM

## 2019-07-16 PROCEDURE — 99214 OFFICE O/P EST MOD 30 MIN: CPT

## 2019-07-16 NOTE — CONSULT LETTER
[Dear  ___] : Dear  [unfilled], [Courtesy Letter:] : I had the pleasure of seeing your patient, [unfilled], in my office today. [Please see my note below.] : Please see my note below. [Consult Closing:] : Thank you very much for allowing me to participate in the care of this patient.  If you have any questions, please do not hesitate to contact me. [Sincerely,] : Sincerely, [FreeTextEntry3] : Tatiana Gallardo MD\par Pediatric Endocrinology\par [facility]\par [facility] [place]\par

## 2019-07-16 NOTE — DISCUSSION/SUMMARY
[FreeTextEntry1] : Howie has primary hypothyroidism, euthyroid on the current dose.\par \par Howie has multiple autoimmune disorders including alopecia totalis and plaque psoriasis.  Autoimmune disease is known to be associated with Down syndrome.  This also puts him at risk for autoimmune adrenalitis.  We have screened him for this prior, last cortisol level was borderline.  At this time early morning is acceptable.\par \par Finally, Howie has vitamin D insufficiency due to poor dairy intake, now controlled, to continue daily vitamin D supplement.

## 2019-07-16 NOTE — DATA REVIEWED
[FreeTextEntry1] : Date: \par 7/28/2018   25-OH Vitamin D (quest:[id number] [facility]:[id number]): 29 ng/mL (Abnormal)    TSH ([facility]:[id number] quest:99963Q): 1.76 UIU/mL (Normal)    Free T4 ([facility]:038352 quest:46158Q): 1.3 ng/dL (Normal)    TSH ([facility]:617552 quest:91429U): 1.59 UIU/mL (Normal)    Cortisol (Quest: 45554D [facility]:[id number]): 5.1 mcg/dl (Low Normal)    25-OH Vitamin D (quest:11735T [facility]:[id number]): 26 ng/mL (Abnormal)    Free T4 ([facility]:785446 quest:23912H): 1.4 ng/dL (Normal)    \par \par 2/20/2019   TSH ([facility]:175142 quest:86644D): 2.63 UIU/mL (Normal)    Anti-Adrenal Abs (quest:01338B [facility]:317255): neg  (Normal)    Cortisol (Quest: 12638I [facility]:197350): 6.7 mcg/dl (Normal)    21-OH antibodies (quest:81247J [facility]:899158): neg  (Normal)    Free T4 ([facility]:125752 quest:11908L): 1.2 ng/dL (Normal)    \par \par 7/1/19 TSH 1.64 FT4 1.3 AMCortisol 9.7

## 2019-07-16 NOTE — PHYSICAL EXAM
[Healthy Appearing] : healthy appearing [Well Nourished] : well nourished [Interactive] : interactive [Obese] : obese [Dysmorphic] : dysmorphic  [Antimongaloid Slant] : antimongaloid slant [Normal S1 and S2] : normal S1 and S2 [Clear to Ausculation Bilaterally] : clear to auscultation bilaterally [Abdomen Soft] : soft [Abdomen Tenderness] : non-tender [] : no hepatosplenomegaly [Normal] : normal  [Goiter] : no goiter [de-identified] : speech very difficult to understand, but talkative [de-identified] : numerous psoriatic plaques legs , scalp, trunk; no hair scalp, arms, legs [de-identified] : Down facies [de-identified] : macroglossia [de-identified] : no murmur appreciated [de-identified] : sternotomy scar [FreeTextEntry1] : small upper epigastric hernia [de-identified] : brisk patellar DTRs

## 2019-07-16 NOTE — REVIEW OF SYSTEMS
[Nl] : Genitourinary [Skin Lesions] : skin lesions [Joint Pains] : arthralgias [Diaphoresis] : not diaphoretic [Chest Pain] : no chest pain [Constipation] : no constipation [Cold Intolerance] : cold tolerant [FreeTextEntry1] : sleep apnea, difficulty tolerating CPAP mask

## 2019-07-16 NOTE — HISTORY OF PRESENT ILLNESS
[FreeTextEntry2] : Howie is 19y5m M here for follow-up of primary hypothyroidism, Down syndome, and vitamin D insufficiency.  Has been taking synthroid regularly, no omission, separates from other medications by 1/2 hour. No constipation, no fatigue, no cold intolerence. Good energy, good appetite. Remains on zoloft - sleeping well with this.  He will be starting a new program, on a [employer] team\par \par s/p hospitalization 3/2019 with strep throat, high fever, R/O endocarditis, questionable pericardial effusion - but transesophageal echo normal\par \par 4/2019 dx MERLIN - fighting CPAP.  Followed by Dr. Garcia.\par \par Psoriasis worsened - getting Orteza with improvement.  Some arthralgias sometimes, no arthritis.\par \par Cardiac - stable, followed by Dr. Perez

## 2019-11-18 ENCOUNTER — RX RENEWAL (OUTPATIENT)
Age: 19
End: 2019-11-18

## 2020-01-21 ENCOUNTER — APPOINTMENT (OUTPATIENT)
Dept: PEDIATRIC ENDOCRINOLOGY | Facility: CLINIC | Age: 20
End: 2020-01-21

## 2020-09-01 ENCOUNTER — APPOINTMENT (OUTPATIENT)
Dept: PEDIATRIC ENDOCRINOLOGY | Facility: CLINIC | Age: 20
End: 2020-09-01

## 2020-09-04 RX ORDER — ZOLPIDEM TARTRATE 10 MG/1
10 TABLET ORAL
Qty: 30 | Refills: 0 | Status: DISCONTINUED | COMMUNITY
Start: 2020-03-25

## 2020-09-04 RX ORDER — FUROSEMIDE 20 MG/1
20 TABLET ORAL
Qty: 90 | Refills: 0 | Status: DISCONTINUED | COMMUNITY
Start: 2020-07-24

## 2020-09-04 RX ORDER — BETAMETHASONE DIPROPIONATE 0.5 MG/G
0.05 OINTMENT, AUGMENTED TOPICAL
Qty: 50 | Refills: 0 | Status: DISCONTINUED | COMMUNITY
Start: 2020-01-15

## 2020-09-04 RX ORDER — LEVOCETIRIZINE DIHYDROCHLORIDE 5 MG/1
5 TABLET ORAL
Qty: 30 | Refills: 0 | Status: DISCONTINUED | COMMUNITY
Start: 2020-07-30

## 2020-09-04 RX ORDER — APREMILAST 30 MG/1
TABLET, FILM COATED ORAL
Refills: 0 | Status: DISCONTINUED | COMMUNITY
End: 2020-09-04

## 2020-09-04 RX ORDER — LEVOTHYROXINE SODIUM 0.07 MG/1
75 TABLET ORAL
Qty: 90 | Refills: 1 | Status: ACTIVE | COMMUNITY
Start: 1900-01-01 | End: 1900-01-01

## 2020-09-04 RX ORDER — ACETAMINOPHEN AND CODEINE 300; 30 MG/1; MG/1
300-30 TABLET ORAL
Qty: 18 | Refills: 0 | Status: DISCONTINUED | COMMUNITY
Start: 2020-08-04

## 2020-09-04 RX ORDER — FOLIC ACID 1 MG/1
1 TABLET ORAL
Qty: 30 | Refills: 0 | Status: ACTIVE | COMMUNITY
Start: 2020-03-25

## 2020-09-04 RX ORDER — SERTRALINE HYDROCHLORIDE 100 MG/1
100 TABLET, FILM COATED ORAL
Qty: 30 | Refills: 0 | Status: DISCONTINUED | COMMUNITY
Start: 2020-06-12

## 2020-09-04 RX ORDER — SPIRONOLACTONE 25 MG/1
25 TABLET ORAL
Qty: 120 | Refills: 0 | Status: DISCONTINUED | COMMUNITY
Start: 2020-07-24

## 2020-09-04 RX ORDER — WARFARIN 5 MG/1
5 TABLET ORAL
Qty: 90 | Refills: 0 | Status: DISCONTINUED | COMMUNITY
Start: 2020-06-16

## 2020-09-04 RX ORDER — SERTRALINE HYDROCHLORIDE 50 MG/1
50 TABLET, FILM COATED ORAL
Qty: 45 | Refills: 0 | Status: DISCONTINUED | COMMUNITY
Start: 2020-05-19

## 2020-09-04 RX ORDER — METHOTREXATE 2.5 MG/1
2.5 TABLET ORAL
Qty: 16 | Refills: 0 | Status: ACTIVE | COMMUNITY
Start: 2020-07-07

## 2020-09-04 RX ORDER — WARFARIN 1 MG/1
1 TABLET ORAL
Qty: 90 | Refills: 0 | Status: DISCONTINUED | COMMUNITY
Start: 2020-06-16

## 2020-09-04 RX ORDER — HYDROCORTISONE 25 MG/G
2.5 CREAM TOPICAL
Qty: 28 | Refills: 0 | Status: DISCONTINUED | COMMUNITY
Start: 2020-07-24

## 2020-09-04 RX ORDER — ACETAMINOPHEN 325 MG/1
325 TABLET ORAL
Qty: 56 | Refills: 0 | Status: DISCONTINUED | COMMUNITY
Start: 2020-07-24

## 2020-09-04 RX ORDER — TRIAMCINOLONE ACETONIDE 1 MG/G
0.1 CREAM TOPICAL
Refills: 0 | Status: DISCONTINUED | COMMUNITY
End: 2020-09-04

## 2020-11-21 ENCOUNTER — INPATIENT (INPATIENT)
Facility: HOSPITAL | Age: 20
LOS: 2 days | Discharge: HOME | End: 2020-11-24
Attending: INTERNAL MEDICINE | Admitting: INTERNAL MEDICINE
Payer: MEDICARE

## 2020-11-21 VITALS
RESPIRATION RATE: 45 BRPM | SYSTOLIC BLOOD PRESSURE: 189 MMHG | HEART RATE: 165 BPM | OXYGEN SATURATION: 99 % | DIASTOLIC BLOOD PRESSURE: 101 MMHG

## 2020-11-21 DIAGNOSIS — Z95.2 PRESENCE OF PROSTHETIC HEART VALVE: Chronic | ICD-10-CM

## 2020-11-21 DIAGNOSIS — Q21.2 ATRIOVENTRICULAR SEPTAL DEFECT: Chronic | ICD-10-CM

## 2020-11-21 LAB
BASE EXCESS BLDV CALC-SCNC: 4.3 MMOL/L — HIGH (ref -2–2)
CA-I SERPL-SCNC: 1.19 MMOL/L — SIGNIFICANT CHANGE UP (ref 1.12–1.3)
D DIMER BLD IA.RAPID-MCNC: 345 NG/ML DDU — HIGH (ref 0–230)
GAS PNL BLDV: 143 MMOL/L — SIGNIFICANT CHANGE UP (ref 136–145)
GAS PNL BLDV: SIGNIFICANT CHANGE UP
HCO3 BLDV-SCNC: 33 MMOL/L — HIGH (ref 22–29)
HCT VFR BLDA CALC: 47.8 % — HIGH (ref 34–44)
HGB BLD CALC-MCNC: 15.6 G/DL — SIGNIFICANT CHANGE UP (ref 14–18)
LACTATE BLDV-MCNC: 1 MMOL/L — SIGNIFICANT CHANGE UP (ref 0.5–1.6)
PCO2 BLDV: 68 MMHG — HIGH (ref 41–51)
PH BLDV: 7.3 — SIGNIFICANT CHANGE UP (ref 7.26–7.43)
PO2 BLDV: 37 MMHG — SIGNIFICANT CHANGE UP (ref 20–40)
POTASSIUM BLDV-SCNC: 3.9 MMOL/L — SIGNIFICANT CHANGE UP (ref 3.3–5.6)
SAO2 % BLDV: 62 % — SIGNIFICANT CHANGE UP
T3 SERPL-MCNC: 87 NG/DL — SIGNIFICANT CHANGE UP (ref 80–200)
T4 AB SER-ACNC: 6.5 UG/DL — SIGNIFICANT CHANGE UP (ref 4.6–12)
TSH SERPL-MCNC: 5.38 UIU/ML — HIGH (ref 0.27–4.2)

## 2020-11-21 PROCEDURE — 71045 X-RAY EXAM CHEST 1 VIEW: CPT | Mod: 26

## 2020-11-21 PROCEDURE — 99291 CRITICAL CARE FIRST HOUR: CPT

## 2020-11-21 PROCEDURE — 93970 EXTREMITY STUDY: CPT | Mod: 26

## 2020-11-21 PROCEDURE — 93010 ELECTROCARDIOGRAM REPORT: CPT

## 2020-11-21 RX ORDER — PANTOPRAZOLE SODIUM 20 MG/1
40 TABLET, DELAYED RELEASE ORAL
Refills: 0 | Status: DISCONTINUED | OUTPATIENT
Start: 2020-11-21 | End: 2020-11-24

## 2020-11-21 RX ORDER — FUROSEMIDE 40 MG
40 TABLET ORAL DAILY
Refills: 0 | Status: DISCONTINUED | OUTPATIENT
Start: 2020-11-22 | End: 2020-11-22

## 2020-11-21 RX ORDER — REMDESIVIR 5 MG/ML
200 INJECTION INTRAVENOUS EVERY 24 HOURS
Refills: 0 | Status: COMPLETED | OUTPATIENT
Start: 2020-11-21 | End: 2020-11-21

## 2020-11-21 RX ORDER — REMDESIVIR 5 MG/ML
INJECTION INTRAVENOUS
Refills: 0 | Status: DISCONTINUED | OUTPATIENT
Start: 2020-11-21 | End: 2020-11-24

## 2020-11-21 RX ORDER — LEVOTHYROXINE SODIUM 125 MCG
75 TABLET ORAL DAILY
Refills: 0 | Status: DISCONTINUED | OUTPATIENT
Start: 2020-11-21 | End: 2020-11-24

## 2020-11-21 RX ORDER — DEXAMETHASONE 0.5 MG/5ML
6 ELIXIR ORAL DAILY
Refills: 0 | Status: DISCONTINUED | OUTPATIENT
Start: 2020-11-21 | End: 2020-11-24

## 2020-11-21 RX ORDER — MIDAZOLAM HYDROCHLORIDE 1 MG/ML
2 INJECTION, SOLUTION INTRAMUSCULAR; INTRAVENOUS ONCE
Refills: 0 | Status: DISCONTINUED | OUTPATIENT
Start: 2020-11-21 | End: 2020-11-21

## 2020-11-21 RX ORDER — CHLORHEXIDINE GLUCONATE 213 G/1000ML
1 SOLUTION TOPICAL
Refills: 0 | Status: DISCONTINUED | OUTPATIENT
Start: 2020-11-21 | End: 2020-11-24

## 2020-11-21 RX ORDER — FOLIC ACID 0.8 MG
1 TABLET ORAL
Refills: 0 | Status: DISCONTINUED | OUTPATIENT
Start: 2020-11-21 | End: 2020-11-24

## 2020-11-21 RX ORDER — WARFARIN SODIUM 2.5 MG/1
3.5 TABLET ORAL AT BEDTIME
Refills: 0 | Status: DISCONTINUED | OUTPATIENT
Start: 2020-11-21 | End: 2020-11-23

## 2020-11-21 RX ORDER — METOPROLOL TARTRATE 50 MG
25 TABLET ORAL EVERY 8 HOURS
Refills: 0 | Status: DISCONTINUED | OUTPATIENT
Start: 2020-11-21 | End: 2020-11-24

## 2020-11-21 RX ORDER — FUROSEMIDE 40 MG
40 TABLET ORAL ONCE
Refills: 0 | Status: COMPLETED | OUTPATIENT
Start: 2020-11-21 | End: 2020-11-21

## 2020-11-21 RX ORDER — METHOTREXATE 2.5 MG/1
10 TABLET ORAL
Refills: 0 | Status: DISCONTINUED | OUTPATIENT
Start: 2020-11-22 | End: 2020-11-24

## 2020-11-21 RX ORDER — REMDESIVIR 5 MG/ML
100 INJECTION INTRAVENOUS EVERY 24 HOURS
Refills: 0 | Status: DISCONTINUED | OUTPATIENT
Start: 2020-11-22 | End: 2020-11-24

## 2020-11-21 RX ADMIN — Medication 25 MILLIGRAM(S): at 21:48

## 2020-11-21 RX ADMIN — MIDAZOLAM HYDROCHLORIDE 2 MILLIGRAM(S): 1 INJECTION, SOLUTION INTRAMUSCULAR; INTRAVENOUS at 06:27

## 2020-11-21 RX ADMIN — REMDESIVIR 500 MILLIGRAM(S): 5 INJECTION INTRAVENOUS at 12:40

## 2020-11-21 RX ADMIN — WARFARIN SODIUM 3.5 MILLIGRAM(S): 2.5 TABLET ORAL at 21:44

## 2020-11-21 RX ADMIN — Medication 6 MILLIGRAM(S): at 12:40

## 2020-11-21 RX ADMIN — Medication 40 MILLIGRAM(S): at 06:27

## 2020-11-21 NOTE — ED PROVIDER NOTE - NS_EDPROVIDERDISPOUSERTYPE_ED_A_ED
Attending Attestation (For Attendings USE Only)... Ears: no ear pain and no hearing problems.Nose: no nasal congestion and no nasal drainage.Mouth/Throat: no dysphagia, no hoarseness and no throat pain.Neck: no lumps, no pain, no stiffness and no swollen glands.

## 2020-11-21 NOTE — DOWNTIME INTERRUPTION NOTE - TIME SYSTEM UNAVAILABLE
21-Nov-2020 03:40 Subjective:    Patient seen as a new patient consult from [facility] and is seen today  for right ankle sprain.  Had inversion sprain 1 days ago.  States she was in yard with dog and dog was running fast and fit her from behind and she fell forward.  She believes the mechanism of injury was more of a sudden plantar flexor he motion.  She thought she felt a pop.  Had pain and edema, seen in clinic, xrays taken.  Patient given Aircast walking and this is very comfortable.  The patient states approximately 6 to 7 years ago she fell in a similar manner however she had more swelling and more bruising then.  She also states there was another time even before this she may have injured her ankle.  She works as a sitdown job      ROS:  A 10-point review of systems was performed and is positive for that noted in the HPI and as seen above.  All other areas are negative.          Allergies   Allergen Reactions     Percocet [Oxycodone-Acetaminophen] Itching       Current Outpatient Medications   Medication Sig Dispense Refill     acyclovir (ZOVIRAX) 400 MG tablet TAKE 1 TABLET(400 MG) BY MOUTH THREE TIMES DAILY 15 tablet 0     acyclovir (ZOVIRAX) 5 % ointment Apply topically 5 times daily 30 g 3     buPROPion (WELLBUTRIN XL) 150 MG 24 hr tablet TAKE 1 TABLET(150 MG) BY MOUTH EVERY MORNING 90 tablet 0     cetirizine (ZYRTEC) 10 MG tablet Take 1 tablet (10 mg) by mouth every evening 90 tablet 1     Chlorpheniramine-Pseudoeph (DECONGESTANT + PO) Reported on 3/24/2017       clotrimazole-betamethasone (LOTRISONE) 1-0.05 % external cream APPLY EXTERNALLY TO THE AFFECTED AREA TWICE DAILY 15 g 0     clotrimazole-betamethasone (LOTRISONE) cream Apply topically 2 times daily 30 g 0     cyclobenzaprine (FLEXERIL) 10 MG tablet TK 1 T PO  TID PRN MUSCLE SPASMS  0     DULoxetine (CYMBALTA) 20 MG capsule Take 1 capsule (20 mg) by mouth 2 times daily 180 capsule 3     fluconazole (DIFLUCAN) 150 MG tablet   0     fluticasone (FLONASE) 50  MCG/ACT nasal spray Spray 2 sprays into both nostrils daily Reported on 3/24/2017       Hypertonic Nasal Wash (SINUS RINSE BOTTLE KIT) PACK Spray 1 packet in nostril 2 times daily 10 each 3     levonorgestrel (MIRENA) 20 MCG/24HR IUD 1 each by Intrauterine route once       levothyroxine (SYNTHROID/LEVOTHROID) 75 MCG tablet TAKE 1 TABLET(75 MCG) BY MOUTH DAILY 90 tablet 0     lisinopril (PRINIVIL/ZESTRIL) 40 MG tablet Take 1 tablet (40 mg) by mouth daily 90 tablet 3     LORazepam (ATIVAN) 0.5 MG tablet Take 1 tablet (0.5 mg) by mouth every 8 hours as needed for anxiety 15 tablet 0     Multiple Vitamins-Minerals (MULTIVITAMIN ADULT PO)        order for DME Equipment being ordered: cam walker and metal crutches 1 each 0     silver sulfADIAZINE (SILVADENE) 1 % cream Apply thick layer to anus 3-4 times per day for irritation for 5 days. 85 g 1       Patient Active Problem List   Diagnosis     Hypothyroidism     CARDIOVASCULAR SCREENING; LDL GOAL LESS THAN 160     Moderate episode of recurrent major depressive disorder (H)     Obesity     Menorrhagia, treated with mirena IUD (inserted July 2013)      Family history of diabetes mellitus     Dysfunction of eustachian tube     HTN, goal below 140/90     Morbid obesity, unspecified obesity type (H)     Tonsillitis       Past Medical History:   Diagnosis Date     CARDIOVASCULAR SCREENING; LDL GOAL LESS THAN 160 6/6/2012     Hypertension     watching blood pressure     Obesity, unspecified     Obesity     Sleep apnea     no cpap     Thyroid disease        Past Surgical History:   Procedure Laterality Date     BREAST SURGERY      breast reduction bilat     COLONOSCOPY WITH CO2 INSUFFLATION N/A 9/1/2017    Procedure: COLONOSCOPY WITH CO2 INSUFFLATION;  Colonoscopy, Abdominal pain, left lower quadrant, [place], BMI 42.93, -[phone];  Surgeon: Anuel Rodríguez MD;  Location: [facility] OR     ENDOSCOPIC SINUS SURGERY Right 3/15/2017    Procedure: ENDOSCOPIC SINUS  SURGERY;;  Surgeon: Vicki Zurita MD;  Location: [facility] OR     EXAM UNDER ANESTHESIA, FULGURATE CONDYLOMA ANUS, COMBINED N/A 9/26/2017    Procedure: COMBINED EXAM UNDER ANESTHESIA, FULGURATE CONDYLOMA ANUS;  Examination Under anethesia, Excision And Fulguration Of Anal Condyloma;  Surgeon: Td Garvey MD;  Location: [facility] OR     LAPAROSCOPIC CHOLECYSTECTOMY  08/05/99     TONSILLECTOMY Bilateral 3/15/2017    Procedure: TONSILLECTOMY;  Bilateral Tonsillectomy, Right Functional Endoscopic Sinus Surgery ;  Surgeon: Vicki Zurita MD;  Location: [facility] OR       Family History   Problem Relation Age of Onset     Diabetes Mother      Hypertension Mother      Cancer Father         pancreatic     Heart Disease Maternal Grandmother      Diabetes Maternal Grandmother      Cerebrovascular Disease Maternal Grandfather      Cancer - colorectal Paternal Grandfather      Diabetes Brother      Asthma Son      Depression Son      Asthma Daughter        Social History     Tobacco Use     Smoking status: Never Smoker     Smokeless tobacco: Never Used   Substance Use Topics     Alcohol use: No         Exam:    Vitals: Wt 127 kg (280 lb)   BMI 42.44 kg/m    BMI: Body mass index is 42.44 kg/m .  Height: Data Unavailable    Constitutional/ general:  Pt is in no apparent distress, appears well-nourished.  Cooperative with history and physical exam.     Psych:  The patient answered questions appropriately.  Normal affect.  Seems to have reasonable expectations, in terms of treatment.     Eyes:  Visual scanning/ tracking without deficit.     Ears:  Response to auditory stimuli is normal.  No hearing aid devices.  Auricles in proper alignment.     Lymphatic:  Popliteal lymph nodes not enlarged.     Lungs:  Non labored breathing, non labored speech. No cough.  No audible wheezing. Even, quiet breathing.       Vascular:  positive pedal pulses bilaterally for both the DP and PT arteries.  CFT < 3 sec.  negative ankle  edema.  positive pedal hair growth.    Neuro:  Alert and oriented x 3. Coordinated gait.  Light touch sensation is intact to the L4, L5, S1 distributions. No obvious deficits.  No evidence of neurological-based weakness, spasticity, or contracture in the lower extremities.      Derm: Normal texture and turgor.  No erythema, ecchymosis, or cyanosis.      Musculoskeletal:    Lower extremity muscle strength is normal.   No gross deformities.   Normal arch with weight bearing.  Muscle strength 5/5 in all compartments.  No pain with stressing any tendons.  Normal ROM all forefoot and rearfoot joints.  Anterior drawer equal and symmetrical bilateral.  Inversion equal and symmetrical bilateral.         negative ecchymosis  negative erythema  negative pain at TMTJs or styloid process.  negative Achilles or calcaneal tubercle pain  negative medial ankle pain  negative pain AITF ligament  negative pain with stressing or palpation peroneal tendons  negative peroneal subluxation  negative pain over medial or lateral malleoli  Pain mostly over ATFL and to a lesser degree over the CFL.  Edema noted mostly over the ATFL.    Radiographic Exam:  X-Ray Findings:  I personally reviewed the films.  A tiny bone ossicle  lateral malleolus that could represent an avulsion fracture.  Some of this is somewhat rounded making me wonder if this is not old    Assessment:  right ankle sprain       Plan:  X-rays personally reviewed.  Discussed with patient she may have avulsed this bone on 1 of her past ankle sprains.  We discussed perhaps the sprain she did 6 years ago where she had a lot of bruising and more swelling.  Discussed PRICE.  Patient will wrap this in an Ace bandage.  She will continue the Aircast.  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  We gave her an F8 ankle brace to wear while driving.  Discussed the importance of not reinjuring this.  Return to the clinic in 2 weeks for reevaluation.  Thank you for allowing me  participate in the care of this patient.        Jim Lehman DPM, FACFAS

## 2020-11-21 NOTE — ED PROVIDER NOTE - EMPLOYMENT
impaired ability to control trunk for mobility/decreased ability to use arms for pushing/pulling/decreased ability to use legs for bridging/pushing
Unemployed

## 2020-11-21 NOTE — H&P ADULT - NSHPLABSRESULTS_GEN_ALL_CORE
12.7   9.48  )-----------( 265      ( 21 Nov 2020 03:15 )             38.9   11-21    143  |  105  |  18  ----------------------------<  123<H>  4.1   |  28  |  0.9    Ca    9.2      21 Nov 2020 03:15  Phos  3.2     11-21  Mg     2.0     11-21    TPro  7.1  /  Alb  4.0  /  TBili  0.9  /  DBili  x   /  AST  36  /  ALT  35  /  AlkPhos  116<H>  11-21

## 2020-11-21 NOTE — ED ADULT NURSE NOTE - OBJECTIVE STATEMENT
pt brought in by family. pt is a special needs. pt found SOB at home with rapid respiration., in triage ekg completed found to be in rapid afib with rvr.

## 2020-11-21 NOTE — CONSULT NOTE ADULT - SUBJECTIVE AND OBJECTIVE BOX
Patient is a 20y old  Male who presents with a chief complaint of dyspnea    HPI:    20 year old male with past medical history of Down's syndrome, mitral regurgitation, hypothyroidism, eczema, psoriasis, alopecia, and AV cushion defect with AV reconstruction and mitral valve replacement on coumadin. Recent open heart surgery revision of valve defect. Comes to ed with mother for dyspnea x 1 week. In disterss this am so mom brought to ed. In er he was hypoxic and labored, plaed on BPAP. AFIB RVR, he was cardioverted to NSR. Covid positive. now on BPAP 40%, comfrtable sitting in chair.    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    H/O mitral valve replacement        SOCIAL HX:   Smoking nonsmoker                         ETOH    no                        Other    FAMILY HISTORY:  Family history of mitral valve prolapse    FH: hypertension    :  No known cardiovacular family hisotry     Review Of Systems:     All ROS are negative except per HPI       Allergies    No Known Allergies    Intolerances          PHYSICAL EXAM    ICU Vital Signs Last 24 Hrs  T(C): 36.4 (21 Nov 2020 10:00), Max: 37 (21 Nov 2020 08:10)  T(F): 97.6 (21 Nov 2020 10:00), Max: 98.6 (21 Nov 2020 08:10)  HR: 100 (21 Nov 2020 11:30) (92 - 165)  BP: 145/75 (21 Nov 2020 11:30) (110/69 - 217/86)  BP(mean): 119 (21 Nov 2020 08:10) (119 - 119)  ABP: --  ABP(mean): --  RR: 20 (21 Nov 2020 11:30) (20 - 45)  SpO2: 99% (21 Nov 2020 11:30) (95% - 99%)      CONSTITUTIONAL:    NAD on BPAP    ENT:   Airway patent,   Mouth with normal mucosa.   No thrush    EYES:   pupils equal,   round and reactive to light.    CARDIAC:   Normal rate,   Regular rhythm.    Heart sounds S1, S2.   No edema      Vascular:   normal systolic impulse  no bruits    RESPIRATORY:   No wheezing   Normal chest expansion  No use of accessory muscles    GASTROINTESTINAL:  Abdomen soft   Non-tender,   No guarding,   + BS    GENITOURINARY  normal genitalia for sex  no edema    MUSCULOSKELETAL:   Range of motion is not limited,  Nno clubbing, cyanosis    NEUROLOGICAL:   Alert   nonfocal    SKIN:   Skin normal color for race,   Warm and dry  No evidence of rash.      HEME LYMPH:   No cervical  lymphadenopathy.  No inguinal lymphadenopathy              LABS:                          12.7   9.48  )-----------( 265      ( 21 Nov 2020 03:15 )             38.9                                               11-21    143  |  105  |  18  ----------------------------<  123<H>  4.1   |  28  |  0.9    Ca    9.2      21 Nov 2020 03:15  Phos  3.2     11-21  Mg     2.0     11-21    TPro  7.1  /  Alb  4.0  /  TBili  0.9  /  DBili  x   /  AST  36  /  ALT  35  /  AlkPhos  116<H>  11-21      PT/INR - ( 21 Nov 2020 03:15 )   PT: 29.20 sec;   INR: 2.54 ratio         PTT - ( 21 Nov 2020 03:15 )  PTT:44.8 sec                                           CARDIAC MARKERS ( 21 Nov 2020 03:15 )  x     / <0.01 ng/mL / x     / x     / x                                                LIVER FUNCTIONS - ( 21 Nov 2020 03:15 )  Alb: 4.0 g/dL / Pro: 7.1 g/dL / ALK PHOS: 116 U/L / ALT: 35 U/L / AST: 36 U/L / GGT: x                                                                                                 CXR interpreted by me: bilateral opacitites    MEDICATIONS  (STANDING):  chlorhexidine 4% Liquid 1 Application(s) Topical <User Schedule>  dexAMETHasone  Injectable 6 milliGRAM(s) IV Push daily  pantoprazole    Tablet 40 milliGRAM(s) Oral before breakfast  remdesivir  IVPB   IV Intermittent   remdesivir  IVPB 200 milliGRAM(s) IV Intermittent every 24 hours    MEDICATIONS  (PRN):         Patient is a 20y old  Male who presents with a chief complaint of dyspnea    HPI:    20 year old male with past medical history of Down's syndrome, mitral regurgitation, hypothyroidism, eczema, psoriasis, alopecia, and AV cushion defect with AV reconstruction and mitral valve replacement on coumadin. Recent open heart surgery revision of valve defect. Comes to ed with mother for dyspnea x 1 week.  In er he was hypoxic and labored, placed on BPAP. AFIB RVR, he was cardioverted to NSR. Covid positive. 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    H/O mitral valve replacement        SOCIAL HX:   Smoking nonsmoker                         ETOH    no                        Other    FAMILY HISTORY:  Family history of mitral valve prolapse    FH: hypertension    :  No known cardiovacular family hisotry     Review Of Systems:     All ROS are negative except per HPI       Allergies    No Known Allergies    Intolerances          PHYSICAL EXAM    ICU Vital Signs Last 24 Hrs  T(C): 36.4 (21 Nov 2020 10:00), Max: 37 (21 Nov 2020 08:10)  T(F): 97.6 (21 Nov 2020 10:00), Max: 98.6 (21 Nov 2020 08:10)  HR: 100 (21 Nov 2020 11:30) (92 - 165)  BP: 145/75 (21 Nov 2020 11:30) (110/69 - 217/86)  BP(mean): 119 (21 Nov 2020 08:10) (119 - 119)  RR: 20 (21 Nov 2020 11:30) (20 - 45)  SpO2: 99% (21 Nov 2020 11:30) (95% - 99%)      CONSTITUTIONAL:  bipap    ENT:   Airway patent,   Mouth with normal mucosa.   No thrush    EYES:   pupils equal,   round and reactive to light.    CARDIAC:   BRAULIO 3.6        RESPIRATORY:   DEC both bases    GASTROINTESTINAL:  Abdomen soft   Non-tender,   No guarding,   + BS    MUSCULOSKELETAL:   Range of motion is not limited,  Nno clubbing, cyanosis    NEUROLOGICAL:   Alert   nonfocal                LABS:                          12.7   9.48  )-----------( 265      ( 21 Nov 2020 03:15 )             38.9                                               11-21    143  |  105  |  18  ----------------------------<  123<H>  4.1   |  28  |  0.9    Ca    9.2      21 Nov 2020 03:15  Phos  3.2     11-21  Mg     2.0     11-21    TPro  7.1  /  Alb  4.0  /  TBili  0.9  /  DBili  x   /  AST  36  /  ALT  35  /  AlkPhos  116<H>  11-21      PT/INR - ( 21 Nov 2020 03:15 )   PT: 29.20 sec;   INR: 2.54 ratio         PTT - ( 21 Nov 2020 03:15 )  PTT:44.8 sec                                           CARDIAC MARKERS ( 21 Nov 2020 03:15 )  x     / <0.01 ng/mL / x     / x     / x                                                LIVER FUNCTIONS - ( 21 Nov 2020 03:15 )  Alb: 4.0 g/dL / Pro: 7.1 g/dL / ALK PHOS: 116 U/L / ALT: 35 U/L / AST: 36 U/L / GGT: x                                                                                                 CXR interpreted by me: reviewed    MEDICATIONS  (STANDING):  chlorhexidine 4% Liquid 1 Application(s) Topical <User Schedule>  dexAMETHasone  Injectable 6 milliGRAM(s) IV Push daily  pantoprazole    Tablet 40 milliGRAM(s) Oral before breakfast  remdesivir  IVPB   IV Intermittent   remdesivir  IVPB 200 milliGRAM(s) IV Intermittent every 24 hours    MEDICATIONS  (PRN):

## 2020-11-21 NOTE — ED PROVIDER NOTE - CLINICAL SUMMARY MEDICAL DECISION MAKING FREE TEXT BOX
pt accepted from Dr. Barber. Pt w/ SOB and new AFIB RVR, cardioverted to NS. CXR c/w fluid overload. COVID +. Prior team ordered CTA to eval for PE, however, given CXR, COVID +, No leg swelling, no hx DVT/PE, and therapeutic anticoagulation, PE seems unlikely. CTA cancelled. pt imrpved on BiPAP. Dx discussd w/ parents, also recc for them to quarantine. TFT sent, in consideration of thyrotoxicosis. Pt admitted to ICU

## 2020-11-21 NOTE — ED ADULT NURSE REASSESSMENT NOTE - NS ED NURSE REASSESS COMMENT FT1
upon initial assessment patient agitated, unable to obtain core temperature and second IV access and bloodwork- MD vickers aware. as per ED attending, patient not stable for CT at this time. patient remains on BIPAP with improvement. parents at bedside.

## 2020-11-21 NOTE — ED ADULT NURSE NOTE - NS ED NURSE TRANSPORT WITH
Cardiac Monitor/Defib/ACLS/Rescue Kit/O2/BVM/pulse ox nonrebreather and bipap machiene/Cardiac Monitor/Defib/ACLS/Rescue Kit/O2/BVM/pulse ox

## 2020-11-21 NOTE — CONSULT NOTE ADULT - ASSESSMENT
IMPRESSION:    Acute hypoxic/hypercapnic respiratory failure on BPAP comfortable  Covid PNA  New AFIB RVR s/p cardioversion in ED to NSR  Hx of AVR/MVR for congenital heart defect on coumadin INR is therapeutic  Hx of Down syndrome  Hypothyroid    PLAN:    CNS: No CNS depressants    HEENT:  Oral care    PULMONARY:  HOB @ 45 degrees. BPAP 4h on 4h off, HFNC alternating, goal spo2 94-98%, titrate down fio2, decadron    CARDIOVASCULAR: Goal MAP >65, cardio eval, echo, lasix x 1 in ED, I=O, metoprolol 25 q8h for rate control    GI: NPO, GI ppx    RENAL:  F/u  lytes.  Correct as needed.  Accurate I/O    INFECTIOUS DISEASE: ID eval, remdesivir, Procal, inflammatory markers    HEMATOLOGICAL:  C/w coumadin, fup INR    ENDOCRINE:  Follow up FS.  Insulin protocol if needed.    MUSCULOSKELETAL: Bedrest, LE duplex    LINES/MARTINEZ: no lines no martinez    CODE STATUS: FULL CODE    DISPOSITION: CEU step down     IMPRESSION:    Acute hypoxic/hypercapnic respiratory failure on BPAP comfortable  Covid +  New AFIB RVR s/p cardioversion in ED to NSR/ fluid  overload  Hx of AVR/MVR for congenital heart defect on coumadin INR is therapeutic  Hx of Down syndrome  Hypothyroid    PLAN:    CNS: No CNS depressants    HEENT:  Oral care    PULMONARY:  HOB @ 45 degrees. BPAP 4h on 4h off, HFNC alternating, goal spo2 94-98%, titrate down fio2, decadron    CARDIOVASCULAR: Goal MAP >65, cardio eval ( see for CCU per cardio), echo, lasix iv q 24 in ED, I=O, metoprolol 25 q8h for rate control    GI: NPo while on BPAP    RENAL:  F/u  lytes.  Correct as needed.  Accurate I/O    INFECTIOUS DISEASE: per ID, f/up inflammatory markers    HEMATOLOGICAL:  C/w coumadin, fup INR    ENDOCRINE:  Follow up FS.  Insulin protocol if needed.    MUSCULOSKELETAL: Bedrest, LE duplex    LINES/MARTINEZ: no lines no martinez    CODE STATUS: FULL CODE    poor prognosis

## 2020-11-21 NOTE — ED PROVIDER NOTE - PROGRESS NOTE DETAILS
D-dimer elevated. Ordered CTA r/o PE but pt did not tolerate laying flat as he was agitated. Administered Versed 1 mg IV push x2 but pt still did not tolerate. Will hold off on scan for now (pt is already anticoagulated). Pt remains tachypneic, labored breathing. Placed on BiPAP, tolerating for now. Will reassess. Ndubuisi: Pt with unstable new onset afib. Required synchronized cardioversion. Was sedated with etomidate and tolerated well. Pt's heart rhythm returned to NS. Will continue with workup. Og : sign out received from Dr. Man. Dr. Ferrer spoke to family at bedside. Pt w/ respirations in high 20's on bipap. CTA canceled due to low risk of PE due to patient being anticoagulated , evidence of fluid overload and w/ COVID being the more likely diagnosis. OG : pt approved to ICU by Dr. Wadsworth. ICU resident Karin reports they are in rounds. Requesting a call in 1 hr. Nabil: Pt WOB improved, breathing well w/ BiPAP. color better. tachycardia resolved. BP improved. Og : spoke to ICU resident . Reports they are still in rounds but she will come and see the patient

## 2020-11-21 NOTE — CONSULT NOTE ADULT - ASSESSMENT
21 yo M with PMH of Down Syndrome, Mitral Regurgitation, Hypothyroidism, Psoriasis, Alopecia, and AV cushion defect with AV reconstruction MV replacement, on Coumadin, with recent open heart valve upsizing/revision in July 2020. Patient was brought in to the ED by family after he was noted to be tachypneic and worsening dyspnea overnight. Per mother, he had no fevers, congestion, cough, or recent ill contacts, but was noted to have some progressive dyspnea over one week. His work of breathing worsened around 11pm and the parents prompted to bring him to the ED. On arrival to ED, pt tachypneic to 40s satting 95% on NC 3L.     IMPRESSION;  COVID19 with severe/critical  illness. Hypoxemia with SpO2 < 94% on bipap.  CXR >50% opacities.  Pt is in the early viral replicative phase based on the timeline/onset of symptoms as per parents  ddimers [id number]    RECOMMENDATIONS;  f/u ferritin, CRP, procalcitonin  Day 1 : Single  mg IV loading dose  Day 2-5 : single  mg IV once daily maintenance dose  Daily CBC,CMP.  Dexamethasone 6 mg iv q24h for 10 days ( or until discharge ) or equivalent glucocorticoid dose may be substituted. Equivalent total dosis of alternative steroids are Methylprednisolone 32 mg and prednisone 40 mg q24h.  Monitor for side effects: hyperglycemia, neurological ( agitation/confusion), adrenal suppression, bacterial and fungal infections  Anticoagulation as per team

## 2020-11-21 NOTE — H&P ADULT - NSHPSOCIALHISTORY_GEN_ALL_CORE
Marital Status:  (   )    ( X  ) Single    (   )    (  )   Lives with: (  ) alone  (  ) children   (  ) spouse   (  X) parents  (  ) other  Recent Travel: No recent travel      Substance Use (street drugs): ( x ) never used  (  ) other:  Tobacco Usage:  ( x  ) never smoked   (   ) former smoker   (   ) current smoker  (     ) pack year  Alcohol Usage: None   Baseline mobility: independant

## 2020-11-21 NOTE — PATIENT PROFILE ADULT - NSPROMEDSBROUGHTTOHOSP_GEN_A_NUR
Pregnancy Quick Tips   The Answers to the Most Common Pregnancy Questions    Visits with physician  Monthly through 28 weeks, every other week from 28-35 weeks and weekly from 35 weeks. Your physician will advise you at each visit when they would like to see you next.    Diet  Do:  Drink at least 60 ounces of water each day.  Eat fruits and vegetables at each meal or as a snack.  Eat small, frequent meals each including a protein and fiber source.  Use lean cuts of beef as primary protein.  Enjoy fish, soft cheese and lunch meats 1-2 times per week. Be sure they are from a trustworthy source.    Don't:   Use alcohol or tobacco in any amount.  Drink more than 1-2 small servings of coffee, tea or sodas each day.  Consume any raw meat or fish.    Exercise  Do:  Exercise to tolerance and no pushing past \"the wall\".  Be able to carry on a conversation while exercising.  If you cannot you are working too hard.    Don't:   Try new regimens.  No contact sports (this means you-skiers, horseback riders, kickboxing and volley ball players!)  No abdominal involvement.     Medications  Cold symptoms: Mucinex, Sudafed, Tylenol (maximum for 3000 mg daily), Robitussin  Allergies: Benadryl, Zyrtec, Claritin  Constipation: Increase fluids and exercise. Over the counter stool softener as needed. Laxatives (Miralax, Milk of Magnesia) per package directions sparingly if stool softeners ineffective.  Headache/Body aches: Heat/ice, Tylenol (maximum for 3000 mg daily)  Heartburn: Tums, 1-2 every 4-6 hours, may add Pepcid (20 mg daily) or Zantac (150 mg daily) if Tums ineffective.    Contractions  Contractions are common during the last trimester  Location and intensity are not as important as the timing of contractions.  Contractions often occur due to dehydration, constipation or full bladder. Try correcting the problem and the contractions will usually subside.  If  (less than 37 weeks) call office if contractions are occurring  regularly (every 10 minutes or more frequently).    Discomforts  Discomfort is not uncommon! Pregnancy is not a [place]!  Nausea/vomiting: Try small frequent meals, pedrito, Sea Bands, Vitamin B6 (25 mg every 8 hours), saltines at bedside and eat before rising.  Heartburn: Decrease or eliminate high acid, spicy or rich foods. Elevate the head of the bed. Use the Medications listed in medications sections.  Constipation: Adequate water intake as above, increase fiber intake, and exercise. Use the Medications listed in medications sections.  Pelvic, abdominal, groin and back pain: Can occur throughout the pregnancy with uterine stretching, fetal growth and fetal positioning. Can be dull, sharp, achy or cramping in nature. Can be constant or intermittent. Pain is usually not significant unless accompanied by fever, heavy bleeding or leaking of fluid. Try stretching, increasing fluid intake, heat or ice to affected area, warm (not hot) baths and Tylenol as per Medication section.  Increase in urine volume and/or frequency: Is normal and is to be expected. It will improve following delivery. You may have some urinary incontinence in the third trimester due to fetal positioning.  Shortness of breath or dizziness with exertion: Is more common in first and third trimester. Try increasing fluid intake and ensure adequate exercise.  Headache: Extremely common during pregnancy. Try increasing fluids, using heat/ice as helpful, use Tylenol as described in Medication section.    If you have questions please call your physician's office during office hours.    If you have an urgent or emergent question related to your pregnancy after normal business hours please call [phone] to speak with a triage nurse.         no

## 2020-11-21 NOTE — H&P ADULT - ASSESSMENT
IMPRESSION:  Acute Hypoxic hypercapnic Respiratory failure 2/2 COVID 19 PNA  New onset AFiB RVR s/p cardiovertion  Down Syndrome  Hx of AVR/MCR congenital heart Defect on Coumadin    PLAN:    CNS: Avoid Respiratory Depressants    HEENT: Oral care    PULMONARY:  HOB @ 45 degrees, C/w Bipap 4H on and off, and HFNC,. Maintain SPO2 >94%. C/w decadron. repeat ABG    CARDIOVASCULAR: Maintain MAp >65, (cardio consult), f/u ECHO, c/w metoprolol 25 q8 for rate control,     GI: GI prophylaxis.  NPO while on BiPAP    RENAL:  Follow up lytes.  Correct as needed, Strict Is and Os    INFECTIOUS DISEASE: Follow up inflammatory markers q48, started on Remdesavir + Decadron    HEMATOLOGICAL:  c/w Coumadin 4 mg daily, Daily INR    ENDOCRINE:  Follow up FS.  Insulin protocol if needed.      CODE: FUll  MICU       IMPRESSION:  Acute Hypoxic hypercapnic Respiratory failure 2/2 COVID 19 PNA  New onset AFiB RVR s/p cardiovertion  Down Syndrome  Hx of AVR/MCR congenital heart Defect on Coumadin    PLAN:    CNS: Avoid Respiratory Depressants    HEENT: Oral care    PULMONARY:  HOB @ 45 degrees, C/w Bipap 4H on and off, and HFNC,. Maintain SPO2 >94%. C/w decadron. repeat ABG    CARDIOVASCULAR: Maintain MAp >65, (cardio consult), f/u ECHO, c/w metoprolol 25 q8 for rate control,     GI: GI prophylaxis.  NPO while on BiPAP    RENAL:  Follow up lytes.  Correct as needed, Strict Is and Os    INFECTIOUS DISEASE: Follow up inflammatory markers q48, started on Remdesavir + Decadron    HEMATOLOGICAL:  c/w Coumadin 4 mg daily, Daily INR    ENDOCRINE:  Follow up FS.  Insulin protocol if needed.      CODE: FUll

## 2020-11-21 NOTE — ED PROVIDER NOTE - OBJECTIVE STATEMENT
The pt is a 20y M w/ hx of down syndrome, AVR as a child, open heart surgery 3 months ago for issues with the valve currently on Coumadin, presenting with SOB The pt is a 20y M w/ hx of down syndrome, AVR as a child, open heart surgery 3 months ago for issues with the valve currently on Coumadin, presenting with worsening SOB x several days. Pt's parents say it was worst around bedtime tonight and they decided to come to the ED after breathing remained labored for ~1 hour. Parents note that yesterday night, he had an episode of chest pain On arrival to ED, pt tachypneic to 40s satting 95% on NC 3L. EKG shows afib w/ RVR; pt has no hx of afib. Per parents at bedside, pt not complaining of headache, N/V, abdominal pain, diarrhea. No recent travel or sick contacts at home.

## 2020-11-21 NOTE — H&P ADULT - NSHPREVIEWOFSYSTEMS_GEN_ALL_CORE
REVIEW OF SYSTEMS:    CONSTITUTIONAL: No weakness, fevers or chills  EYES/ENT: No visual changes;  No vertigo or throat pain   NECK: No pain or stiffness  RESPIRATORY: + increased work of breathing, hypoxia  CARDIOVASCULAR: No chest pain or palpitations  GASTROINTESTINAL: No abdominal or epigastric pain. No nausea, vomiting, or hematemesis; No diarrhea or constipation. No melena or hematochezia.  GENITOURINARY: No dysuria, frequency or hematuria  NEUROLOGICAL: No numbness or weakness  SKIN: No itching, rashes

## 2020-11-21 NOTE — H&P ADULT - HISTORY OF PRESENT ILLNESS
19 yo M with PMH of Down Syndrome, Mitral Regurgitation, Hypothyroidism, Psoriasis, Alopecia, and AV cushion defect with AV reconstruction MV replacement, on Coumadin, with recent open heart valve upsizing/revision in July 2020. Patient was brought in to the ED by family after he was noted to be tachypneic and worsening dyspnea overnight. Per mother, he had no fevers, congestion, cough, or recent ill contacts, but was noted to have some progressive dyspnea over one week. Hisork of breathing worsened around 11pm and the parents zander 19 yo M with PMH of Down Syndrome, Mitral Regurgitation, Hypothyroidism, Psoriasis, Alopecia, and AV cushion defect with AV reconstruction MV replacement, on Coumadin, with recent open heart valve upsizing/revision in July 2020. Patient was brought in to the ED by family after he was noted to be tachypneic and worsening dyspnea overnight. Per mother, he had no fevers, congestion, cough, or recent ill contacts, but was noted to have some progressive dyspnea over one week. His work of breathing worsened around 11pm and the parents prompted to bring him to the ED. On arrival to ED, pt tachypneic to 40s satting 95% on NC 3L. EKG shows afib w/ RVR; pt has no hx of afib. Per parents at bedside, pt not complaining of headache, N/V, abdominal pain, diarrhea. No recent travel or sick contacts at home.    ED course: Patient remaine din Afib RVR,, was sedated and underwent synchronized cardioversion to NSR. Found to be COVID +. Patient placed on BiPAP at 40%, with improved 19 yo M with PMH of Down Syndrome, Mitral Regurgitation, Hypothyroidism, Psoriasis, Alopecia, and AV cushion defect with AV reconstruction MV replacement, on Coumadin, with recent open heart valve upsizing/revision in July 2020. Patient was brought in to the ED by family after he was noted to be tachypneic and worsening dyspnea overnight. Per mother, he had no fevers, congestion, cough, or recent ill contacts, but was noted to have some progressive dyspnea over one week. His work of breathing worsened around 11pm and the parents prompted to bring him to the ED. On arrival to ED, pt tachypneic to 40s satting 95% on NC 3L. EKG shows afib w/ RVR; pt has no hx of afib. Per parents at bedside, pt not complaining of headache, N/V, abdominal pain, diarrhea. No recent travel or sick contacts at home.    ED course: Patient remaine din Afib RVR,, was sedated and underwent synchronized cardioversion to NSR. Found to be COVID +. Patient placed on BiPAP at 40%, with improved, HR stable.

## 2020-11-21 NOTE — CONSULT NOTE ADULT - ATTENDING COMMENTS
patient seen and examined agree with above, Acute hypoxemic/ hyperca resp failure, covid+, rapid A fib, s/p cardioversion, cardio eval, echo, lasix, infl markers, ID, poor prognosis

## 2020-11-21 NOTE — ED PROVIDER NOTE - ATTENDING CONTRIBUTION TO CARE
I personally evaluated the patient. I reviewed the Resident’s or Physician Assistant’s note (as assigned above), and agree with the findings and plan except as documented in my note.  20 yr M with hx of Down's syndrome. aortic valvular repair, multiple times, last surgery July 2020, with a mechanical valve, anticoagulated on coumadin, 4mg daily, hypothyroidism presents with complaints of SOB that mom noted about 4 hours pta. Pt was noted to be in new onset afib after triage EKG done. Pt was hypoxic to about 90% ra and placed on O2. Pt also complaining of SOB. No fever, coughing, congestion. VS reviewed, pt in moderate respiratory distress,  Head ncat, MMM, neck supple, normal ROM, normal s1s2 without any murmurs, Lungs with bl crackles, pt is tachypneic, abd +BS, s/nd/nt, extremities wnl, neuro exam grossly normal. No acute skin rashes. Plan is IV, O2, monitor, labs, images, meds as needed and reassess.

## 2020-11-21 NOTE — H&P ADULT - NSHPPHYSICALEXAM_GEN_ALL_CORE
General: Obese, agitated  Neuro: alert and oriented, no focal deficits, moves all extremities spontaneously  HEENT: NCAT, EOMI, anicteric, mucosa moist  Respiratory: airway patent, respirations unlabored, b/l rhonchi  CVS: regular rate and rhythm  Abdomen: soft, nontender, nondistended  Extremities: no edema, sensation and movement grossly intact  Skin: warm, dry, appropriate color

## 2020-11-21 NOTE — CONSULT NOTE ADULT - SUBJECTIVE AND OBJECTIVE BOX
LERMANPEDRO NUÑEZ  20y, Male  Allergy: No Known Allergies      All historical available data reviewed.    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 Patient was brought in to the ED by family after he was noted to be tachypneic and worsening dyspnea overnight. Per mother, he had no fevers, congestion, cough, or recent ill contacts, but was noted to have some progressive dyspnea over one week. His work of breathing worsened around 11pm and the parents prompted to bring him to the ED. On arrival to ED, pt tachypneic to 40s satting 95% on NC 3L. EKG shows afib w/ RVR; pt has no hx of afib. Per parents at bedside, pt not complaining of headache, N/V, abdominal pain, diarrhea. No recent travel or sick contacts at home.    ED course: Patient remaine din Afib RVR,, was sedated and underwent synchronized cardioversion to NSR. Found to be COVID +. Patient placed on BiPAP at 40%, with improved, HR stable.  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    H/O mitral valve replacement          VITALS:  T(F): 97.6, Max: 98.6 (11-21-20 @ 08:10)  HR: 92  BP: 112/62  RR: 25Vital Signs Last 24 Hrs  T(C): 36.4 (21 Nov 2020 10:00), Max: 37 (21 Nov 2020 08:10)  T(F): 97.6 (21 Nov 2020 10:00), Max: 98.6 (21 Nov 2020 08:10)  HR: 92 (21 Nov 2020 17:00) (92 - 165)  BP: 112/62 (21 Nov 2020 17:00) (108/58 - 217/86)  BP(mean): 119 (21 Nov 2020 08:10) (119 - 119)  RR: 25 (21 Nov 2020 17:00) (20 - 45)  SpO2: 99% (21 Nov 2020 17:00) (95% - 99%)    TESTS & MEASUREMENTS:                        12.7   9.48  )-----------( 265      ( 21 Nov 2020 03:15 )             38.9     11-21    143  |  105  |  18  ----------------------------<  123<H>  4.1   |  28  |  0.9    Ca    9.2      21 Nov 2020 03:15  Phos  3.2     11-21  Mg     2.0     11-21    TPro  7.1  /  Alb  4.0  /  TBili  0.9  /  DBili  x   /  AST  36  /  ALT  35  /  AlkPhos  116<H>  11-21    LIVER FUNCTIONS - ( 21 Nov 2020 03:15 )  Alb: 4.0 g/dL / Pro: 7.1 g/dL / ALK PHOS: 116 U/L / ALT: 35 U/L / AST: 36 U/L / GGT: x                   RADIOLOGY & ADDITIONAL TESTS:  Personal review of radiological diagnostics performed  Echo and EKG results noted when applicable.     MEDICATIONS:  chlorhexidine 4% Liquid 1 Application(s) Topical <User Schedule>  dexAMETHasone  Injectable 6 milliGRAM(s) IV Push daily  folic acid 1 milliGRAM(s) Oral <User Schedule>  levothyroxine 75 MICROGram(s) Oral daily  metoprolol tartrate 25 milliGRAM(s) Oral every 8 hours  pantoprazole    Tablet 40 milliGRAM(s) Oral before breakfast  remdesivir  IVPB   IV Intermittent   warfarin 3.5 milliGRAM(s) Oral at bedtime      ANTIBIOTICS:  remdesivir  IVPB   IV Intermittent

## 2020-11-22 LAB
ALBUMIN SERPL ELPH-MCNC: 4.1 G/DL — SIGNIFICANT CHANGE UP (ref 3.5–5.2)
ALP SERPL-CCNC: 118 U/L — HIGH (ref 30–115)
ALT FLD-CCNC: 30 U/L — SIGNIFICANT CHANGE UP (ref 0–41)
ANION GAP SERPL CALC-SCNC: 12 MMOL/L — SIGNIFICANT CHANGE UP (ref 7–14)
AST SERPL-CCNC: 22 U/L — SIGNIFICANT CHANGE UP (ref 0–41)
BILIRUB SERPL-MCNC: 0.7 MG/DL — SIGNIFICANT CHANGE UP (ref 0.2–1.2)
BUN SERPL-MCNC: 17 MG/DL — SIGNIFICANT CHANGE UP (ref 10–20)
CALCIUM SERPL-MCNC: 9 MG/DL — SIGNIFICANT CHANGE UP (ref 8.5–10.1)
CHLORIDE SERPL-SCNC: 98 MMOL/L — SIGNIFICANT CHANGE UP (ref 98–110)
CO2 SERPL-SCNC: 29 MMOL/L — SIGNIFICANT CHANGE UP (ref 17–32)
CREAT SERPL-MCNC: 0.8 MG/DL — SIGNIFICANT CHANGE UP (ref 0.7–1.5)
GLUCOSE SERPL-MCNC: 166 MG/DL — HIGH (ref 70–99)
HCT VFR BLD CALC: 38.1 % — LOW (ref 42–52)
HGB BLD-MCNC: 13 G/DL — LOW (ref 14–18)
MCHC RBC-ENTMCNC: 32.2 PG — HIGH (ref 27–31)
MCHC RBC-ENTMCNC: 34.1 G/DL — SIGNIFICANT CHANGE UP (ref 32–37)
MCV RBC AUTO: 94.3 FL — HIGH (ref 80–94)
NRBC # BLD: 0 /100 WBCS — SIGNIFICANT CHANGE UP (ref 0–0)
PLATELET # BLD AUTO: 292 K/UL — SIGNIFICANT CHANGE UP (ref 130–400)
POTASSIUM SERPL-MCNC: 3.9 MMOL/L — SIGNIFICANT CHANGE UP (ref 3.5–5)
POTASSIUM SERPL-SCNC: 3.9 MMOL/L — SIGNIFICANT CHANGE UP (ref 3.5–5)
PROT SERPL-MCNC: 7.9 G/DL — SIGNIFICANT CHANGE UP (ref 6–8)
RBC # BLD: 4.04 M/UL — LOW (ref 4.7–6.1)
RBC # FLD: 13.8 % — SIGNIFICANT CHANGE UP (ref 11.5–14.5)
SODIUM SERPL-SCNC: 139 MMOL/L — SIGNIFICANT CHANGE UP (ref 135–146)
WBC # BLD: 13.18 K/UL — HIGH (ref 4.8–10.8)
WBC # FLD AUTO: 13.18 K/UL — HIGH (ref 4.8–10.8)

## 2020-11-22 PROCEDURE — 99233 SBSQ HOSP IP/OBS HIGH 50: CPT

## 2020-11-22 PROCEDURE — 71045 X-RAY EXAM CHEST 1 VIEW: CPT | Mod: 26

## 2020-11-22 PROCEDURE — 93010 ELECTROCARDIOGRAM REPORT: CPT

## 2020-11-22 RX ORDER — FOLIC ACID 0.8 MG
1 TABLET ORAL ONCE
Refills: 0 | Status: COMPLETED | OUTPATIENT
Start: 2020-11-22 | End: 2020-11-22

## 2020-11-22 RX ORDER — FUROSEMIDE 40 MG
40 TABLET ORAL DAILY
Refills: 0 | Status: DISCONTINUED | OUTPATIENT
Start: 2020-11-22 | End: 2020-11-23

## 2020-11-22 RX ADMIN — Medication 25 MILLIGRAM(S): at 21:14

## 2020-11-22 RX ADMIN — Medication 25 MILLIGRAM(S): at 05:56

## 2020-11-22 RX ADMIN — Medication 1 MILLIGRAM(S): at 21:14

## 2020-11-22 RX ADMIN — Medication 6 MILLIGRAM(S): at 05:52

## 2020-11-22 RX ADMIN — PANTOPRAZOLE SODIUM 40 MILLIGRAM(S): 20 TABLET, DELAYED RELEASE ORAL at 06:27

## 2020-11-22 RX ADMIN — Medication 40 MILLIGRAM(S): at 05:51

## 2020-11-22 RX ADMIN — Medication 0.5 MILLIGRAM(S): at 00:22

## 2020-11-22 RX ADMIN — REMDESIVIR 500 MILLIGRAM(S): 5 INJECTION INTRAVENOUS at 13:27

## 2020-11-22 RX ADMIN — Medication 40 MILLIGRAM(S): at 11:18

## 2020-11-22 RX ADMIN — WARFARIN SODIUM 3.5 MILLIGRAM(S): 2.5 TABLET ORAL at 21:14

## 2020-11-22 RX ADMIN — Medication 2 MILLIGRAM(S): at 08:22

## 2020-11-22 RX ADMIN — Medication 25 MILLIGRAM(S): at 13:27

## 2020-11-22 RX ADMIN — CHLORHEXIDINE GLUCONATE 1 APPLICATION(S): 213 SOLUTION TOPICAL at 06:27

## 2020-11-22 NOTE — CONSULT NOTE ADULT - ASSESSMENT
IMPRESSION:    ARF pulmonary edema   COVID 19   HO Valvular HD SP MV replacement   HO Down     PLAN:    CNS:  No depressants     HEENT: Oral care    PULMONARY:  HOB @ 45 degrees.  Aspiration precautions     CARDIOVASCULAR:  I<O.  Maximize cardiac therapy and volume status.  FU with cardiology     GI: GI prophylaxis.  Feeding.  Bowel regimen     RENAL:  Follow up lytes.  Correct as needed    INFECTIOUS DISEASE: Follow up cultures.  Per ID.      HEMATOLOGICAL:  DVT prophylaxis on AC.  Fu coags     ENDOCRINE:  Follow up FS.      MUSCULOSKELETAL:  OOB to chair     Prognosis poor

## 2020-11-22 NOTE — CONSULT NOTE ADULT - SUBJECTIVE AND OBJECTIVE BOX
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 Patient was brought in to the ED by family after he was noted to be tachypneic and worsening dyspnea overnight. Per mother, he had no fevers, congestion, cough, or recent ill contacts, but was noted to have some progressive dyspnea over one week. His work of breathing worsened around 11pm and the parents prompted to bring him to the ED. On arrival to ED, pt tachypneic to 40s satting 95% on NC 3L. EKG shows afib w/ RVR; pt has no hx of afib. Per parents at bedside, pt not complaining of headache, N/V, abdominal pain, diarrhea. No recent travel or sick contacts at home.    ED course: Patient remaine din Afib RVR,, was sedated and underwent synchronized cardioversion to NSR. Found to be COVID +. Patient placed on BiPAP at 40%, with improved, HR stable.  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    H/O mitral valve replacement        SOCIAL HX:   Smoking    No                     ETOH           no                 Other    FAMILY HISTORY:  Family history of mitral valve prolapse    FH: hypertension    :  No known cardiovacular family hisotry     Review Of Systems:     All ROS are negative except per HPI       Allergies    No Known Allergies    Intolerances          PHYSICAL EXAM    ICU Vital Signs Last 24 Hrs  T(C): 35.6 (22 Nov 2020 08:00), Max: 36.4 (21 Nov 2020 10:00)  T(F): 96 (22 Nov 2020 08:00), Max: 97.6 (21 Nov 2020 10:00)  HR: 83 (22 Nov 2020 08:00) (70 - 102)  BP: 107/65 (22 Nov 2020 08:00) (98/61 - 145/88)  BP(mean): 80 (22 Nov 2020 08:00) (80 - 80)  ABP: --  ABP(mean): --  RR: 30 (22 Nov 2020 08:00) (20 - 30)  SpO2: 90% (22 Nov 2020 08:00) (86% - 99%)      CONSTITUTIONAL:  Well nourished.  NAD    ENT:   Airway patent,   Mouth with normal mucosa.   No thrush    EYES:   pupils equal,   round and reactive to light.    CARDIAC:   Normal rate,   Regular rhythm.    Heart sounds S1, S2.   edema      Vascular:   normal systolic impulse  no bruits    RESPIRATORY:   No wheezing   Normal chest expansion  No use of accessory muscles    GASTROINTESTINAL:  Abdomen soft   Non-tender,   No guarding,   + BS    GENITOURINARY  normal genitalia for sex  no edema    MUSCULOSKELETAL:   Range of motion is not limited,  Nno clubbing, cyanosis    NEUROLOGICAL:   Alert  No motor or sensory deficits.    SKIN:   Skin normal color for race,   Warm and dry  No evidence of rash.    PSYCHIATRIC:   Down   No apparent risk to self or others.    HEME LYMPH:   No cervical  lymphadenopathy.  No inguinal lymphadenopathy            11-22-20 @ 07:01  -  11-22-20 @ 09:44  --------------------------------------------------------  IN:    Oral Fluid: 480 mL  Total IN: 480 mL    OUT:    Voided (mL): 400 mL  Total OUT: 400 mL    Total NET: 80 mL          LABS:                          12.7   9.48  )-----------( 265      ( 21 Nov 2020 03:15 )             38.9                                               11-21    143  |  105  |  18  ----------------------------<  123<H>  4.1   |  28  |  0.9    Ca    9.2      21 Nov 2020 03:15  Phos  3.2     11-21  Mg     2.0     11-21    TPro  7.1  /  Alb  4.0  /  TBili  0.9  /  DBili  x   /  AST  36  /  ALT  35  /  AlkPhos  116<H>  11-21      PT/INR - ( 21 Nov 2020 03:15 )   PT: 29.20 sec;   INR: 2.54 ratio         PTT - ( 21 Nov 2020 03:15 )  PTT:44.8 sec                                           CARDIAC MARKERS ( 21 Nov 2020 03:15 )  x     / <0.01 ng/mL / x     / x     / x                                                LIVER FUNCTIONS - ( 21 Nov 2020 03:15 )  Alb: 4.0 g/dL / Pro: 7.1 g/dL / ALK PHOS: 116 U/L / ALT: 35 U/L / AST: 36 U/L / GGT: x                                                                                                                                       X-Rays reviewed:                                                                                    ECHO    CXR interpreted by me:  Bilateral effusion and edema     MEDICATIONS  (STANDING):  chlorhexidine 4% Liquid 1 Application(s) Topical <User Schedule>  dexAMETHasone  Injectable 6 milliGRAM(s) IV Push daily  folic acid 1 milliGRAM(s) Oral <User Schedule>  furosemide   Injectable 40 milliGRAM(s) IV Push daily  levothyroxine 75 MICROGram(s) Oral daily  methotrexate 10 milliGRAM(s) Oral every week  metoprolol tartrate 25 milliGRAM(s) Oral every 8 hours  pantoprazole    Tablet 40 milliGRAM(s) Oral before breakfast  remdesivir  IVPB   IV Intermittent   remdesivir  IVPB 100 milliGRAM(s) IV Intermittent every 24 hours  warfarin 3.5 milliGRAM(s) Oral at bedtime    MEDICATIONS  (PRN):

## 2020-11-22 NOTE — PROGRESS NOTE ADULT - SUBJECTIVE AND OBJECTIVE BOX
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 Patient was brought in to the ED by family after he was noted to be tachypneic and worsening dyspnea overnight. Per mother, he had no fevers, congestion, cough, or recent ill contacts, but was noted to have some progressive dyspnea over one week. His work of breathing worsened around 11pm and the parents prompted to bring him to the ED. On arrival to ED, pt tachypneic to 40s satting 95% on NC 3L. EKG shows afib w/ RVR; pt has no hx of afib. Per parents at bedside, pt not complaining of headache, N/V, abdominal pain, diarrhea. No recent travel or sick contacts at home.    ED course: Patient remaine din Afib RVR,, was sedated and underwent synchronized cardioversion to NSR. Found to be COVID +. Patient placed on BiPAP at 40%, with improved, HR stable.  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    overnight unable to obtain blood even with ativan prior to phlebotomy attempt- --d/w mother bedside at length- patient remains on bipap- s/p cardiothoracic surgery in July 2020 at [facility] (Mother mentioned she may want to transfer patient to OSH and informed her that to transfer will need accepting MD and arrangements for transfer dependent on availability and limitations due to covid-19--patient is covid +)  also mother endorses no daily lasix prior to admission and patient was able to eat/drink up until 1 day pta (states he drinks a lot of fluid and also eats well including chinese food that he had day pta)    PE:  GEN-NAD, lethargic but responsive to stimuli- mother bedside- +downs syndrome  PULM- decreased air entry with diffuse crackles  CVS- +s1/s2 RRR no murmurs  GI- soft NT ND +bs, no rebound, no guarding  EXT-trace edema               MEDICATIONS  (STANDING):  chlorhexidine 4% Liquid 1 Application(s) Topical <User Schedule>  dexAMETHasone  Injectable 6 milliGRAM(s) IV Push daily  folic acid 1 milliGRAM(s) Oral <User Schedule>  furosemide   Injectable 40 milliGRAM(s) IV Push daily  levothyroxine 75 MICROGram(s) Oral daily  methotrexate 10 milliGRAM(s) Oral every week  metoprolol tartrate 25 milliGRAM(s) Oral every 8 hours  pantoprazole    Tablet 40 milliGRAM(s) Oral before breakfast  remdesivir  IVPB   IV Intermittent   remdesivir  IVPB 100 milliGRAM(s) IV Intermittent every 24 hours  warfarin 3.5 milliGRAM(s) Oral at bedtime

## 2020-11-22 NOTE — PROGRESS NOTE ADULT - ASSESSMENT
a/p:  #Acute hypoxic respiratory failure with COVID-19 viral pneumonia and fluid overload  -continue monitoring in the SDU/CEU   -pulmonary/critical care following  -maintain airbourne/contact isolation precautions  -remdesivir x 5 days  -dexamethasone 6 mg daily x 10 days or until dc  -ID consult  -ddimer 345, BNP 3K  --f/u procal and ferritin  -on coumadin for AC (inr therapeutic)--daily inr (goal 2.5-3.5)  -get ECHO (compare to prior echo from [facility] cardiologist)  -cont bipap/HFNC as tolerated--ABG  -iv lasix 40 mg daily---continue diuresis  -daily cxr  -monitor i/o and daily weights, fluid restrictions  -trop x 1 negative  -recommend that family (mother and father) also get covid swab (2/2 close contact and high risk for covid-19 infection)--mother remains bedside in patient room continuously    #Valvular Heart disease--AV cusion defect s/p AV recon and MVR (revised in july 2020) with new onset AF with RVR  -s/p cardioversion  -AC on coumadin already- monitor inr  -rate control--cont tele monitoring    #DVT/GI ppx  overall poor prognosis 2/2 risk for cardio-pulmonary complications from COVID-19

## 2020-11-22 NOTE — CHART NOTE - NSCHARTNOTEFT_GEN_A_CORE
Went in with respiratory therapist to obtain blood cultures and ABG.  Patient was agitated. Mother present at bedside. We were not able to obtain labs.  Will attempt at a later time

## 2020-11-23 LAB
CRP SERPL-MCNC: 17.88 MG/DL — HIGH (ref 0–0.4)
FERRITIN SERPL-MCNC: 324 NG/ML — SIGNIFICANT CHANGE UP (ref 30–400)
INR BLD: 3.37 RATIO — HIGH (ref 0.65–1.3)
PROTHROM AB SERPL-ACNC: 38.7 SEC — HIGH (ref 9.95–12.87)

## 2020-11-23 PROCEDURE — 99233 SBSQ HOSP IP/OBS HIGH 50: CPT

## 2020-11-23 RX ORDER — WARFARIN SODIUM 2.5 MG/1
2 TABLET ORAL AT BEDTIME
Refills: 0 | Status: DISCONTINUED | OUTPATIENT
Start: 2020-11-23 | End: 2020-11-23

## 2020-11-23 RX ORDER — FUROSEMIDE 40 MG
40 TABLET ORAL DAILY
Refills: 0 | Status: DISCONTINUED | OUTPATIENT
Start: 2020-11-23 | End: 2020-11-24

## 2020-11-23 RX ORDER — WARFARIN SODIUM 2.5 MG/1
2 TABLET ORAL AT BEDTIME
Refills: 0 | Status: COMPLETED | OUTPATIENT
Start: 2020-11-23 | End: 2020-11-23

## 2020-11-23 RX ADMIN — Medication 6 MILLIGRAM(S): at 05:45

## 2020-11-23 RX ADMIN — Medication 25 MILLIGRAM(S): at 05:46

## 2020-11-23 RX ADMIN — Medication 40 MILLIGRAM(S): at 15:51

## 2020-11-23 RX ADMIN — Medication 1 MILLIGRAM(S): at 05:54

## 2020-11-23 RX ADMIN — Medication 40 MILLIGRAM(S): at 05:44

## 2020-11-23 RX ADMIN — REMDESIVIR 500 MILLIGRAM(S): 5 INJECTION INTRAVENOUS at 11:52

## 2020-11-23 RX ADMIN — CHLORHEXIDINE GLUCONATE 1 APPLICATION(S): 213 SOLUTION TOPICAL at 07:32

## 2020-11-23 RX ADMIN — Medication 25 MILLIGRAM(S): at 15:52

## 2020-11-23 RX ADMIN — Medication 25 MILLIGRAM(S): at 22:56

## 2020-11-23 RX ADMIN — PANTOPRAZOLE SODIUM 40 MILLIGRAM(S): 20 TABLET, DELAYED RELEASE ORAL at 08:35

## 2020-11-23 RX ADMIN — WARFARIN SODIUM 2 MILLIGRAM(S): 2.5 TABLET ORAL at 22:55

## 2020-11-23 RX ADMIN — Medication 75 MICROGRAM(S): at 05:46

## 2020-11-23 NOTE — PROGRESS NOTE ADULT - ASSESSMENT
a/p:  #Acute hypoxic respiratory failure with COVID-19 viral pneumonia and fluid overload  -continue monitoring in the SDU/CEU   -pulmonary/critical care following  -maintain airbourne/contact isolation precautions  -remdesivir x 5 days  -dexamethasone 6 mg daily x 10 days or until dc-- seen by ID  -procal and ferritin in lab  -on coumadin for AC (inr therapeutic)--daily inr (goal 2.5-3.5)  --currently on room air  -iv lasix 40 mg daily---continue diuresis  -daily cxr  -  #Valvular Heart disease--AV cusion defect s/p AV recon and MVR (revised in july 2020) with new onset AF with RVR  -s/p cardioversion  -AC on coumadin  inr i pending  -rate control--on metoprolol 25mg q8h    Spoke with father at bedside-- he does not want transfer to [place]-- patient is currently comfortable sitting in chair.

## 2020-11-23 NOTE — PROGRESS NOTE ADULT - SUBJECTIVE AND OBJECTIVE BOX
SUBJECTIVE:    Patient is a 20y old Male who presents with a chief complaint of COVID-19 PNA (23 Nov 2020 08:56)    Currently admitted to medicine with the primary diagnosis of New onset atrial fibrillation       Today is hospital day 2d.     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    H/O mitral valve replacement      ALLERGIES:  No Known Allergies    MEDICATIONS:  STANDING MEDICATIONS  chlorhexidine 4% Liquid 1 Application(s) Topical <User Schedule>  dexAMETHasone  Injectable 6 milliGRAM(s) IV Push daily  folic acid 1 milliGRAM(s) Oral <User Schedule>  furosemide   Injectable 40 milliGRAM(s) IV Push daily  levothyroxine 75 MICROGram(s) Oral daily  methotrexate 10 milliGRAM(s) Oral every week  metoprolol tartrate 25 milliGRAM(s) Oral every 8 hours  pantoprazole    Tablet 40 milliGRAM(s) Oral before breakfast  remdesivir  IVPB   IV Intermittent   remdesivir  IVPB 100 milliGRAM(s) IV Intermittent every 24 hours  warfarin 3.5 milliGRAM(s) Oral at bedtime    PRN MEDICATIONS    VITALS:   T(F): 98.1  HR: 69  BP: 111/60  RR: 24  SpO2: 93%    LABS:                        13.0   13.18 )-----------( 292      ( 22 Nov 2020 12:08 )             38.1     11-22    139  |  98  |  17  ----------------------------<  166<H>  3.9   |  29  |  0.8    Ca    9.0      22 Nov 2020 12:08    TPro  7.9  /  Alb  4.1  /  TBili  0.7  /  DBili  x   /  AST  22  /  ALT  30  /  AlkPhos  118<H>  11-22                  RADIOLOGY:    PHYSICAL EXAM:  GEN: No acute distress  LUNGS: Clear to auscultation bilaterally   HEART: S1/S2 present. RRR.   ABD/ GI: Soft, non-tender, non-distended. Bowel sounds present  EXT:  no edema  NEURO: AAOX3

## 2020-11-23 NOTE — CHART NOTE - NSCHARTNOTEFT_GEN_A_CORE
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 Patient was brought in to the ED by family after he was noted to be tachypneic and worsening dyspnea overnight. Per mother, he had no fevers, congestion, cough, or recent ill contacts, but was noted to have some progressive dyspnea over one week. His work of breathing worsened around 11pm and the parents prompted to bring him to the ED. On arrival to ED, pt tachypneic to 40s satting 95% on NC 3L. EKG shows afib w/ RVR; pt has no hx of afib. Per parents at bedside, pt not complaining of headache, N/V, abdominal pain, diarrhea. No recent travel or sick contacts at home.    ED course: Patient remaine din Afib RVR,, was sedated and underwent synchronized cardioversion to NSR. Found to be COVID +. Patient placed on BiPAP at 40%, with improved, HR stable.     While in CEU, patient was treated for ARF due to covid vs acute decompensated HF. He was managed for his covid with Dexa, Remdesivir. Patient sats well on RA and only needs NC intermittantly. Patient is on IV lasix for his volume overload. Patients afib and valvular disease is managed with metoprolol and coumadin for AC.     Patient is medically stable for downgrade to telemetry.     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     #Acute hypoxic respiratory failure with COVID-19 viral pneumonia and fluid overload  - ddimer 345, BNP 3K  - trop x 1 negative  - continue remdesivir x 5 days- stop date on 11/25  - continue dexamethasone 6 mg daily x 10 days or until dc  - cont bipap/HFNC as tolerated-ABG  - continue iv lasix 40 mg daily  - f/u procal and ferritin  - Fu ID consult  - Fu pulmonary/critical care following  - pending ECHO (compare to prior echo from osh cardiologist)  - daily cxr    #Valvular Heart disease--AV cusion defect s/p AV recon and MVR (revised in july 2020) with new onset AF with RVR  #Acute decompensated heart failure / volume overload  - s/p cardioversion  - AC on coumadin already  - rate control with Lopressor 25 mg Q8  - Daily INR  - Fu cardiology- FU echo, continue lasix 40 mg q24 and Lopressor 25 mg q8    # GI PPx - Protonix   # DVT PPx - coumadin  # Activity -  (X) Increase as Tolerated  # Dispo -   overall poor prognosis 2/2 risk for cardio-pulmonary complications from COVID-19. recommend that family (mother and father) also get covid swab (2/2 close contact and high risk for covid-19 infection). mother remains bedside in patient room continuously  #Full code      For Follow-Up:  - daily INR- adjust coumadin as needed.   - Fluid status        Vital Signs Last 24 Hrs  T(C): 36.7 (23 Nov 2020 08:22), Max: 36.7 (23 Nov 2020 08:22)  T(F): 98.1 (23 Nov 2020 08:22), Max: 98.1 (23 Nov 2020 08:22)  HR: 69 (23 Nov 2020 12:00) (69 - 89)  BP: 135/109 (23 Nov 2020 12:00) (89/64 - 135/109)  BP(mean): 60 (23 Nov 2020 08:22) (60 - 90)  RR: 24 (23 Nov 2020 12:00) (15 - 34)  SpO2: 96% (23 Nov 2020 12:30) (93% - 97%)  I&O's Summary    22 Nov 2020 07:01  -  23 Nov 2020 07:00  --------------------------------------------------------  IN: 1370 mL / OUT: 3050 mL / NET: -1680 mL    23 Nov 2020 07:01  -  23 Nov 2020 15:16  --------------------------------------------------------  IN: 250 mL / OUT: 0 mL / NET: 250 mL          MEDICATIONS  (STANDING):  chlorhexidine 4% Liquid 1 Application(s) Topical <User Schedule>  dexAMETHasone  Injectable 6 milliGRAM(s) IV Push daily  folic acid 1 milliGRAM(s) Oral <User Schedule>  furosemide    Tablet 40 milliGRAM(s) Oral daily  levothyroxine 75 MICROGram(s) Oral daily  methotrexate 10 milliGRAM(s) Oral every week  metoprolol tartrate 25 milliGRAM(s) Oral every 8 hours  pantoprazole    Tablet 40 milliGRAM(s) Oral before breakfast  remdesivir  IVPB   IV Intermittent   remdesivir  IVPB 100 milliGRAM(s) IV Intermittent every 24 hours  warfarin 2 milliGRAM(s) Oral at bedtime    MEDICATIONS  (PRN):        LABS        ( 11-23 @ 12:46 )   PT: 38.70 sec;   INR: 3.37 ratio  aPTT: x

## 2020-11-23 NOTE — PROGRESS NOTE ADULT - SUBJECTIVE AND OBJECTIVE BOX
SUBJECTIVE:    Patient is a 20y old Male who presents with a chief complaint of COVID-19 PNA (23 Nov 2020 05:53)    Currently admitted to medicine with the primary diagnosis of: Afib and COVID    Today is hospital day 2d.     Overnight Events:     No acute overnight events.    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    H/O mitral valve replacement    ALLERGIES:  No Known Allergies    MEDICATIONS:  STANDING MEDICATIONS  chlorhexidine 4% Liquid 1 Application(s) Topical <User Schedule>  dexAMETHasone  Injectable 6 milliGRAM(s) IV Push daily  folic acid 1 milliGRAM(s) Oral <User Schedule>  furosemide   Injectable 40 milliGRAM(s) IV Push daily  levothyroxine 75 MICROGram(s) Oral daily  methotrexate 10 milliGRAM(s) Oral every week  metoprolol tartrate 25 milliGRAM(s) Oral every 8 hours  pantoprazole    Tablet 40 milliGRAM(s) Oral before breakfast  remdesivir  IVPB   IV Intermittent   remdesivir  IVPB 100 milliGRAM(s) IV Intermittent every 24 hours  warfarin 3.5 milliGRAM(s) Oral at bedtime    PRN MEDICATIONS    VITALS:   ICU Vital Signs Last 24 Hrs  T(C): 36.7 (23 Nov 2020 08:22), Max: 36.7 (23 Nov 2020 08:22)  T(F): 98.1 (23 Nov 2020 08:22), Max: 98.1 (23 Nov 2020 08:22)  HR: 69 (23 Nov 2020 08:22) (69 - 98)  BP: 111/60 (23 Nov 2020 08:22) (89/64 - 131/73)  BP(mean): 60 (23 Nov 2020 08:22) (60 - 92)  ABP: --  ABP(mean): --  RR: 22 (23 Nov 2020 08:22) (15 - 34)  SpO2: 97% (23 Nov 2020 08:22) (91% - 100%)      LABS:                        13.0   13.18 )-----------( 292      ( 22 Nov 2020 12:08 )             38.1     11-22    139  |  98  |  17  ----------------------------<  166<H>  3.9   |  29  |  0.8    Ca    9.0      22 Nov 2020 12:08    TPro  7.9  /  Alb  4.1  /  TBili  0.7  /  DBili  x   /  AST  22  /  ALT  30  /  AlkPhos  118<H>  11-22      RADIOLOGY:    < from: Xray Chest 1 View- PORTABLE-Routine (11.22.20 @ 08:40) >  Impression:    Bilateral opacities/pleural effusions, unchanged.    < end of copied text >      PHYSICAL EXAM:    GENERAL: NAD,  CHEST/LUNG: decreased BS. On NC  HEART: Regular rate and rhythm  ABDOMEN: Bowel sounds present; Soft, Nontender, Nondistended.

## 2020-11-23 NOTE — PROGRESS NOTE ADULT - ASSESSMENT
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     #Acute hypoxic respiratory failure with COVID-19 viral pneumonia and fluid overload  - ddimer 345, BNP 3K  - trop x 1 negative  - continue remdesivir x 5 days- stop date on 11/25  - continue dexamethasone 6 mg daily x 10 days or until dc  - cont bipap/HFNC as tolerated-ABG  - continue iv lasix 40 mg daily  - f/u procal and ferritin  - Fu ID consult  - Fu pulmonary/critical care following  - pending ECHO (compare to prior echo from [facility] cardiologist)  - daily cxr    #Valvular Heart disease--AV cusion defect s/p AV recon and MVR (revised in july 2020) with new onset AF with RVR  #Acute decompensated heart failure / volume overload  - s/p cardioversion  - AC on coumadin already  - rate control with Lopressor 25 mg Q8  - Daily INR  - Fu cardiology- FU echo, continue lasix 40 mg q24 and Lopressor 25 mg q8    # GI PPx - Protonix   # DVT PPx - coumadin  # Activity -  (X) Increase as Tolerated  # Dispo -   overall poor prognosis 2/2 risk for cardio-pulmonary complications from COVID-19. recommend that family (mother and father) also get covid swab (2/2 close contact and high risk for covid-19 infection). mother remains bedside in patient room continuously  #Full code

## 2020-11-23 NOTE — CONSULT NOTE ADULT - SUBJECTIVE AND OBJECTIVE BOX
Date of Admission:     CHIEF COMPLAINT: sob    HISTORY OF PRESENT ILLNESS:   21 YO M with PMH of Down Syndrome, Mitral Regurgitation, Hypothyroidism, Psoriasis, Alopecia, and AV cushion defect with AV reconstruction, mechanical MV replacement on Coumadin, with recent open heart valve upsizing/revision in 2020. Patient was brought in to the ED by family after he was noted to be tachypneic and worsening dyspnea overnight. Per mother, he had no fevers, congestion, cough, or recent ill contacts, but was noted to have some progressive dyspnea over one week. His work of breathing worsened around 11pm and the parents prompted to bring him to the ED. On arrival to ED, pt tachypneic to 40s satting 95% on NC 3L. EKG shows afib w/ RVR; pt has no hx of afib. Per parents at bedside, pt not complaining of headache, N/V, abdominal pain, diarrhea. No recent travel or sick contacts at home.    ED course: Patient remaine din Afib RVR,, was sedated and underwent synchronized cardioversion to NSR. Found to be COVID +. Patient placed on BiPAP at 40%, with improved, HR stable.  (2020 12:04)    Cardiology HPI: (history provided  by patient's mother at the bedside)  Pt. with extensive congenital cardiac history, being followed by Dr. Barney (Cardiologist), brought from home by his mother for worsening sob. Pt. was noted to be in A.Fib with RVR in the ER, s/p DCCV. 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  Mother:   Father:   Siblings:     SOCIAL HISTORY:    [ x] Non-smoker  [ ] Smoker  [ ] Alcohol    Allergies    No Known Allergies    Intolerances    REVIEW OF SYSTEMS:  CONSTITUTIONAL: No fever, weight loss, or fatigue  CARDIOLOGY: denies chest pain, shortness of breath or syncopal episodes.   RESPIRATORY: denies shortness of breath, wheezing.   NEUROLOGICAL: No weakness, no focal deficits to report.  ENDOCRINOLOGICAL: no recent change in diabetic medications.   GI: no BRBPR, no N,V, diarrhea.    PSYCHIATRY: normal mood and affect  HEENT: no nasal discharge, no ecchymosis  SKIN: no ecchymosis, no breakdown  MUSCULOSKELETAL: Full range of motion x4.     PHYSICAL EXAM:  T(C): 36.1 (20 @ 00:21), Max: 36.6 (20 @ 16:00)  HR: 71 (20 @ 02:00) (71 - 98)  BP: 118/65 (20 @ 00:21) (89/64 - 131/73)  RR: 32 (20 @ 02:00) (15 - 34)  SpO2: 95% (20 @ 02:00) (90% - 100%)  Wt(kg): --  I&O's Summary    2020 07:01  -  2020 05:53  --------------------------------------------------------  IN: 1370 mL / OUT: 2550 mL / NET: -1180 mL        General Appearance: well appearing, normal for age and gender. 	  Neck: normal JVP, no bruit.   Eyes: No xanthelasma, Extra ocular muscles intact.   Cardiovascular: regular rate and rhythm S1 S2, No JVD, No murmurs, No edema  Respiratory: bibasilar crackles on auscultation	  Psychiatry: Alert and oriented x 1, Mood & affect appropriate  Gastrointestinal:  Soft, Non-tender  Skin/Integument: No rashes, No ecchymosis, No cyanosis	  Neurologic: Non-focal  Musculoskeletal/ extremities: Normal range of motion, No clubbing, cyanosis or edema  Vascular: Peripheral pulses palpable 2+ bilaterally    LABS:	 	                          13.0   13.18 )-----------( 292      ( 2020 12:08 )             38.1         139  |  98  |  17  ----------------------------<  166<H>  3.9   |  29  |  0.8    Ca    9.0      2020 12:08    TPro  7.9  /  Alb  4.1  /  TBili  0.7  /  DBili  x   /  AST    ALT  30  /  AlkPhos  118<H>              TELEMETRY EVENTS: 	        EC Lead ECG (20 @ 11:46)   Normal sinus rhythm  Right bundle branch block  Minimal voltage criteria for LVH, may be normal variant        RADIOLOGY:  CXR:     PREVIOUS DIAGNOSTIC TESTING:    [ ] Echocardiogram:      [ ]  Catheterization:      [ ] Stress Test:  	  	    Home Medications:  Coumadin 2.5 mg oral tablet: tab(s) orally once a day (30 Mar 2019 07:36)  Synthroid 25 mcg (0.025 mg) oral tablet: tab(s) orally once a day (30 Mar 2019 07:36)  Zoloft 25 mg oral tablet: 2 tab(s) orally once a day (30 Mar 2019 15:39)    MEDICATIONS  (STANDING):  chlorhexidine 4% Liquid 1 Application(s) Topical <User Schedule>  dexAMETHasone  Injectable 6 milliGRAM(s) IV Push daily  folic acid 1 milliGRAM(s) Oral <User Schedule>  furosemide   Injectable 40 milliGRAM(s) IV Push daily  levothyroxine 75 MICROGram(s) Oral daily  methotrexate 10 milliGRAM(s) Oral every week  metoprolol tartrate 25 milliGRAM(s) Oral every 8 hours  pantoprazole    Tablet 40 milliGRAM(s) Oral before breakfast  remdesivir  IVPB   IV Intermittent   remdesivir  IVPB 100 milliGRAM(s) IV Intermittent every 24 hours  warfarin 3.5 milliGRAM(s) Oral at bedtime    MEDICATIONS  (PRN):

## 2020-11-23 NOTE — CONSULT NOTE ADULT - ASSESSMENT
21 YO M with PMH of Down Syndrome, Mitral Regurgitation, Hypothyroidism, Psoriasis, Alopecia, and AV cushion defect with AV reconstruction, mechanical MV replacement on Coumadin, with recent open heart valve upsizing/revision in July 2020, adnmitted with sob, found to have COVID PNA, complicated by A.Fib with RVR, and volume overload.     Impression:    Down syndrome  Congenital heart disease with AV cushion defect s/p AV reconstruction and multiple cardiac surgeries  Mitral Regurgitation s/p mechanical MV replacement on coumadin, with recent open heart valve upsizing/revision in July 2020  A.Fib with RVR s/p DCCV in ER, now in NSR  COVID-19 PNA with acute respiratory failure  Acute decompensated heart failure / volume overload    Recommend:    - Continue with IV Lasix 40mg Q24, and adjust based on response  - Monitor urine output, and check input and output  - Obtain 2d echo (limited study)   - Continue with coumadin, and Lopressor 25mg PO Q8  - Continue with decadron and Remdesevir for COVID PNA as per ID  - Discussed plan of care with patient's mother

## 2020-11-24 ENCOUNTER — TRANSCRIPTION ENCOUNTER (OUTPATIENT)
Age: 20
End: 2020-11-24

## 2020-11-24 VITALS
DIASTOLIC BLOOD PRESSURE: 61 MMHG | TEMPERATURE: 97 F | SYSTOLIC BLOOD PRESSURE: 115 MMHG | HEART RATE: 64 BPM | OXYGEN SATURATION: 96 % | RESPIRATION RATE: 18 BRPM

## 2020-11-24 LAB — PROCALCITONIN SERPL-MCNC: 0.06 NG/ML — SIGNIFICANT CHANGE UP (ref 0.02–0.1)

## 2020-11-24 PROCEDURE — 71045 X-RAY EXAM CHEST 1 VIEW: CPT | Mod: 26

## 2020-11-24 PROCEDURE — 99221 1ST HOSP IP/OBS SF/LOW 40: CPT

## 2020-11-24 PROCEDURE — 99239 HOSP IP/OBS DSCHRG MGMT >30: CPT

## 2020-11-24 RX ORDER — FUROSEMIDE 40 MG
1 TABLET ORAL
Qty: 5 | Refills: 0
Start: 2020-11-24 | End: 2020-12-03

## 2020-11-24 RX ORDER — METOPROLOL TARTRATE 50 MG
1 TABLET ORAL
Qty: 90 | Refills: 0
Start: 2020-11-24 | End: 2020-12-23

## 2020-11-24 RX ORDER — WARFARIN SODIUM 2.5 MG/1
0 TABLET ORAL
Qty: 0 | Refills: 0 | DISCHARGE

## 2020-11-24 RX ORDER — METHOTREXATE 2.5 MG/1
4 TABLET ORAL
Qty: 0 | Refills: 0 | DISCHARGE
Start: 2020-11-24

## 2020-11-24 RX ORDER — PANTOPRAZOLE SODIUM 20 MG/1
1 TABLET, DELAYED RELEASE ORAL
Qty: 0 | Refills: 0 | DISCHARGE
Start: 2020-11-24

## 2020-11-24 RX ORDER — WARFARIN SODIUM 2.5 MG/1
2 TABLET ORAL AT BEDTIME
Refills: 0 | Status: DISCONTINUED | OUTPATIENT
Start: 2020-11-24 | End: 2020-11-24

## 2020-11-24 RX ORDER — FOLIC ACID 0.8 MG
1 TABLET ORAL
Qty: 0 | Refills: 0 | DISCHARGE
Start: 2020-11-24

## 2020-11-24 RX ADMIN — Medication 40 MILLIGRAM(S): at 06:17

## 2020-11-24 RX ADMIN — REMDESIVIR 500 MILLIGRAM(S): 5 INJECTION INTRAVENOUS at 12:18

## 2020-11-24 RX ADMIN — Medication 25 MILLIGRAM(S): at 13:53

## 2020-11-24 RX ADMIN — PANTOPRAZOLE SODIUM 40 MILLIGRAM(S): 20 TABLET, DELAYED RELEASE ORAL at 06:17

## 2020-11-24 RX ADMIN — Medication 75 MICROGRAM(S): at 06:17

## 2020-11-24 RX ADMIN — Medication 25 MILLIGRAM(S): at 06:17

## 2020-11-24 RX ADMIN — Medication 1 MILLIGRAM(S): at 06:17

## 2020-11-24 RX ADMIN — Medication 6 MILLIGRAM(S): at 06:18

## 2020-11-24 NOTE — DISCHARGE NOTE PROVIDER - PROVIDER TOKENS
PROVIDER:[TOKEN:[[id number]],FOLLOWUP:[1 week]] PROVIDER:[TOKEN:[[id number]],FOLLOWUP:[1 week]],FREE:[LAST:[priary],FIRST:[doctor],PHONE:[(   )    -],FAX:[(   )    -]] PROVIDER:[TOKEN:[[id number]],FOLLOWUP:[1 week]],FREE:[LAST:[Primary],FIRST:[doctor],PHONE:[(   )    -],FAX:[(   )    -],FOLLOWUP:[1 week]]

## 2020-11-24 NOTE — DISCHARGE NOTE NURSING/CASE MANAGEMENT/SOCIAL WORK - NSDCFUADDAPPT_GEN_ALL_CORE_FT
please repeat INR in 11/30  8:45 AM at coumadin clinic [address] , Phone : [phone]  Please repeat BMP on 11/30   Repeat TSH in 4 weeks

## 2020-11-24 NOTE — DISCHARGE NOTE PROVIDER - NSDCFUADDAPPT_GEN_ALL_CORE_FT
please repeat INR in 11/30   and repeat TSH in 4 weeks please repeat INR / BMP in 11/30 at coumadin clinic   Repeat TSH in 4 weeks please repeat INR in 11/30  8:45 AM at coumadin clinic [address] , Phone : [phone]  Please repeat BMP on 11/30   Repeat TSH in 4 weeks

## 2020-11-24 NOTE — PROGRESS NOTE ADULT - ASSESSMENT
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     #Acute hypoxic respiratory failure with COVID-19 viral pneumonia and fluid overload  - On RA SpO2   - ddimer 345, BNP 3K  - trop x 1 negative  - continue remdesivir x 5 days- stop date on 11/25  - continue dexamethasone 6 mg daily x 10 days or until dc  - continue iv lasix 40 mg daily  - procal 0.06  and ferritin 324  - ID recs noted   - pulmonary/critical care following  - pending ECHO (compare to prior echo from [facility] cardiologist)      #Valvular Heart disease--AV cusion defect s/p AV recon and MVR (revised in july 2020) with new onset AF with RVR  #Acute decompensated heart failure / volume overload  - s/p cardioversion  - AC on coumadin already  - rate control with Lopressor 25 mg Q8  - Daily INR  - Fu cardiology- FU echo, continue lasix 40 mg q24 and Lopressor 25 mg q8    # GI PPx - Protonix   # DVT PPx - coumadin  # Activity -  (X) Increase as Tolerated  # Dispo -   overall poor prognosis 2/2 risk for cardio-pulmonary complications from COVID-19. recommend that father also get covid swab. mother remains bedside in patient room continuously and was tested positive   #Full code

## 2020-11-24 NOTE — DISCHARGE NOTE PROVIDER - CARE PROVIDER_API CALL
Karthikeyan Whalen)  Cardiovascular Disease; Internal Medicine  [address] [place] [address]  Phone: [phone]  Fax: [phone]  Follow Up Time: 1 week   Karthikeyan Whalen)  Cardiovascular Disease; Internal Medicine  [address] [place] [address]  Phone: [phone]  Fax: [phone]  Follow Up Time: 1 week    priary, doctor  Phone: (   )    -  Fax: (   )    -  Follow Up Time:    Karthikeyan Whalen)  Cardiovascular Disease; Internal Medicine  [address] [place] [address]  Phone: [phone]  Fax: [phone]  Follow Up Time: 1 week    Primary, doctor  Phone: (   )    -  Fax: (   )    -  Follow Up Time: 1 week

## 2020-11-24 NOTE — PROGRESS NOTE ADULT - SUBJECTIVE AND OBJECTIVE BOX
LERMAN, MICHAEL  20y, Male    All available historical data reviewed    OVERNIGHT EVENTS:  no fevers  NAD  98% RA    ROS:  unable to obtain history secondary to patient's mental status and/or sedation      VITALS:  T(F): 97.8, Max: 98.6 (11-23-20 @ 12:30)  HR: 67  BP: 120/78  RR: 18Vital Signs Last 24 Hrs  T(C): 36.6 (24 Nov 2020 07:45), Max: 37 (23 Nov 2020 12:30)  T(F): 97.8 (24 Nov 2020 07:45), Max: 98.6 (23 Nov 2020 12:30)  HR: 67 (24 Nov 2020 07:45) (67 - 79)  BP: 120/78 (24 Nov 2020 07:45) (103/60 - 140/82)  BP(mean): 68 (23 Nov 2020 15:30) (68 - 70)  RR: 18 (24 Nov 2020 07:45) (18 - 24)  SpO2: 98% (24 Nov 2020 07:45) (93% - 98%)    TESTS & MEASUREMENTS:                        13.0   13.18 )-----------( 292      ( 22 Nov 2020 12:08 )             38.1     11-22    139  |  98  |  17  ----------------------------<  166<H>  3.9   |  29  |  0.8    Ca    9.0      22 Nov 2020 12:08    TPro  7.9  /  Alb  4.1  /  TBili  0.7  /  DBili  x   /  AST  22  /  ALT  30  /  AlkPhos  118<H>  11-22    LIVER FUNCTIONS - ( 22 Nov 2020 12:08 )  Alb: 4.1 g/dL / Pro: 7.9 g/dL / ALK PHOS: 118 U/L / ALT: 30 U/L / AST: 22 U/L / GGT: x             Culture - Blood (collected 11-22-20 @ 12:08)  Source: .Blood None  Preliminary Report (11-23-20 @ 19:01):    No growth to date.            RADIOLOGY & ADDITIONAL TESTS:  Personal review of radiological diagnostics performed  Echo and EKG results noted when applicable.     MEDICATIONS:  chlorhexidine 4% Liquid 1 Application(s) Topical <User Schedule>  dexAMETHasone  Injectable 6 milliGRAM(s) IV Push daily  folic acid 1 milliGRAM(s) Oral <User Schedule>  furosemide    Tablet 40 milliGRAM(s) Oral daily  levothyroxine 75 MICROGram(s) Oral daily  methotrexate 10 milliGRAM(s) Oral every week  metoprolol tartrate 25 milliGRAM(s) Oral every 8 hours  pantoprazole    Tablet 40 milliGRAM(s) Oral before breakfast  remdesivir  IVPB   IV Intermittent   remdesivir  IVPB 100 milliGRAM(s) IV Intermittent every 24 hours  warfarin 2 milliGRAM(s) Oral at bedtime      ANTIBIOTICS:  remdesivir  IVPB   IV Intermittent   remdesivir  IVPB 100 milliGRAM(s) IV Intermittent every 24 hours

## 2020-11-24 NOTE — DISCHARGE NOTE NURSING/CASE MANAGEMENT/SOCIAL WORK - PATIENT PORTAL LINK FT
You can access the FollowMyHealth Patient Portal offered by [facility] by registering at the following website: [email or web address]. By joining [email or web address]’s FollowMyHealth portal, you will also be able to view your health information using other applications (apps) compatible with our system.

## 2020-11-24 NOTE — PROGRESS NOTE ADULT - SUBJECTIVE AND OBJECTIVE BOX
24H events:    Patient is a 20y old Male who presents with a chief complaint of COVID-19 PNA (23 Nov 2020 11:55)    Primary diagnosis of New onset atrial fibrillation       Today is hospital day 3d. This morning patient was seen and examined at bedside, resting comfortably in bed.      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    H/O mitral valve replacement      SOCIAL HISTORY:  Social History:  Marital Status:  (   )    ( X  ) Single    (   )    (  )   Lives with: (  ) alone  (  ) children   (  ) spouse   (  X) parents  (  ) other  Recent Travel: No recent travel      Substance Use (street drugs): ( x ) never used  (  ) other:  Tobacco Usage:  ( x  ) never smoked   (   ) former smoker   (   ) current smoker  (     ) pack year  Alcohol Usage: None   Baseline mobility: independant (21 Nov 2020 12:04)      ALLERGIES:  No Known Allergies    MEDICATIONS:  STANDING MEDICATIONS  chlorhexidine 4% Liquid 1 Application(s) Topical <User Schedule>  dexAMETHasone  Injectable 6 milliGRAM(s) IV Push daily  folic acid 1 milliGRAM(s) Oral <User Schedule>  furosemide    Tablet 40 milliGRAM(s) Oral daily  levothyroxine 75 MICROGram(s) Oral daily  methotrexate 10 milliGRAM(s) Oral every week  metoprolol tartrate 25 milliGRAM(s) Oral every 8 hours  pantoprazole    Tablet 40 milliGRAM(s) Oral before breakfast  remdesivir  IVPB   IV Intermittent   remdesivir  IVPB 100 milliGRAM(s) IV Intermittent every 24 hours  warfarin 2 milliGRAM(s) Oral at bedtime    PRN MEDICATIONS        LABS:                        13.0   13.18 )-----------( 292      ( 22 Nov 2020 12:08 )             38.1     11-22    139  |  98  |  17  ----------------------------<  166<H>  3.9   |  29  |  0.8    Ca    9.0      22 Nov 2020 12:08    TPro  7.9  /  Alb  4.1  /  TBili  0.7  /  DBili  x   /  AST  22  /  ALT  30  /  AlkPhos  118<H>  11-22    PT/INR - ( 23 Nov 2020 12:46 )   PT: 38.70 sec;   INR: 3.37 ratio         Culture - Blood (collected 22 Nov 2020 12:08)  Source: .Blood None  Preliminary Report (23 Nov 2020 19:01):    No growth to date.      RADIOLOGY:  < from: VA Duplex Lower Ext Vein Scan, Bilat (11.21.20 @ 17:56) >  Impression:  No evidence of deep venous thrombosis or superficial thrombophlebitis in the bilateral lower extremities.  ICD-10:M79.89  < end of copied text >      < from: Xray Chest 1 View- PORTABLE-Routine (11.22.20 @ 08:40) >  Impression:  Bilateral opacities/pleural effusions, unchanged.  < end of copied text >    VITALS:   T(F): 97.8  HR: 67  BP: 120/78  RR: 18  SpO2: 98%    PHYSICAL EXAM:  GENERAL: NAD  HEAD:  Atraumatic, Normocephalic  EYES: EOMI  NECK: Supple  NERVOUS SYSTEM:  Alert & Oriented X2, non focal   CHEST/LUNG: Clear to auscultation bilaterally  HEART: Regular rate and rhythm; No murmurs, rubs, or gallops  ABDOMEN: Soft, Nontender, Nondistended; Bowel sounds present  EXTREMITIES:  2+ Peripheral Pulses, No clubbing, cyanosis, or edema

## 2020-11-24 NOTE — DISCHARGE NOTE PROVIDER - NSDCCPCAREPLAN_GEN_ALL_CORE_FT
PRINCIPAL DISCHARGE DIAGNOSIS  Diagnosis: New onset atrial fibrillation  Assessment and Plan of Treatment: New onset afib was treated and controlled with Topro . Also you need to take coumadin for both Afib and  mechanical valve . Check INR on 11/30 adm follow up with cardiology and your PCP.      SECONDARY DISCHARGE DIAGNOSES  Diagnosis: Abnormal TSH  Assessment and Plan of Treatment: please repeat TSH in 4 weeks    Diagnosis: Acute pulmonary edema  Assessment and Plan of Treatment: You had COVID infection that was treated with remdesivir and steroids along with heart failure that was treated with Lasix . Continue steroid course . If you noticed and shortness of breath/ chest pain , cone to [facility] ED or call 911 .     PRINCIPAL DISCHARGE DIAGNOSIS  Diagnosis: New onset atrial fibrillation  Assessment and Plan of Treatment: New onset afib was treated and controlled with Toprol . Also you need to take coumadin for both Afib and  mechanical valve . Check INR and BMP on 11/30 and follow up with cardiology and your PCP.      SECONDARY DISCHARGE DIAGNOSES  Diagnosis: Abnormal TSH  Assessment and Plan of Treatment: please repeat TSH in 4 weeks    Diagnosis: Acute pulmonary edema  Assessment and Plan of Treatment: You had COVID infection that was treated with remdesivir and steroids along with heart failure that was treated with Lasix . Continue steroid course . If you noticed and shortness of breath/ chest pain , cone to [facility] ED or call 911 .  Check INR and BMP on 11/30 and follow up with cardiology and your PCP

## 2020-11-24 NOTE — DISCHARGE NOTE PROVIDER - NSDCMRMEDTOKEN_GEN_ALL_CORE_FT
Coumadin 3 mg oral tablet: 1 tab(s) orally once a day (at bedtime) tonight and then resume 4mg.  folic acid 1 mg oral tablet: 1 tab(s) orally   furosemide 40 mg oral tablet: 1 tab(s) orally every 48 hours   methotrexate 2.5 mg oral tablet: 4 tab(s) orally once a week  metoprolol tartrate 25 mg oral tablet: 1 tab(s) orally every 8 hours  pantoprazole 40 mg oral delayed release tablet: 1 tab(s) orally once a day (before a meal)  predniSONE 20 mg oral tablet: 2 tab(s) orally once a day   Synthroid 25 mcg (0.025 mg) oral tablet: tab(s) orally once a day  Zoloft 25 mg oral tablet: 2 tab(s) orally once a day

## 2020-11-24 NOTE — DISCHARGE NOTE PROVIDER - CARE PROVIDERS DIRECT ADDRESSES
,[email or web address]

## 2020-11-24 NOTE — PROGRESS NOTE ADULT - ASSESSMENT
19 yo M with PMH of Down Syndrome, Mitral Regurgitation, Hypothyroidism, Psoriasis, Alopecia, and AV cushion defect with AV reconstruction MV replacement, on Coumadin, with recent open heart valve upsizing/revision in July 2020. Patient was brought in to the ED by family after he was noted to be tachypneic and worsening dyspnea overnight. Per mother, he had no fevers, congestion, cough, or recent ill contacts, but was noted to have some progressive dyspnea over one week. His work of breathing worsened around 11pm and the parents prompted to bring him to the ED. On arrival to ED, pt tachypneic to 40s satting 95% on NC 3L.     IMPRESSION;  COVID19 with resolved severe  illness. 98% RA .  CXR >50% opacities.  Pt is in the early viral replicative phase based on the timeline/onset of symptoms as per parents  procalcitonin 0.06  , not suggestive of a bacterial PNA.  Ferritiin 324  CRP 17.8  ddimers 345    RECOMMENDATIONS;  Day 1 : Single  mg IV loading dose  Day 2-5 : single  mg IV once daily maintenance dose  Daily CBC,CMP.  Dexamethasone 6 mg iv q24h for 10 days ( or until discharge ) or equivalent glucocorticoid dose may be substituted. Equivalent total dosis of alternative steroids are Methylprednisolone 32 mg and prednisone 40 mg q24h.  Monitor for side effects: hyperglycemia, neurological ( agitation/confusion), adrenal suppression, bacterial and fungal infections  Could finish course of steroids with po prednisone 40 mg q24 for a total of 10 days and hold RDV after todays dose  Anticoagulation as per team  Discharge pt  from ID standpoint as resolution of fevers, clinical improvement in SOB/cough, improvement in O2 requirements. O2 saturation at rest >90%.

## 2020-11-24 NOTE — DISCHARGE NOTE PROVIDER - NSFOLLOWUPCLINICS_GEN_ALL_ED_FT
[facility]  Coumadin  [address] [place] [address]  Phone: [phone]  Fax:   Follow Up Time: 1 week

## 2020-11-24 NOTE — DISCHARGE NOTE PROVIDER - HOSPITAL COURSE
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     #Acute hypoxic respiratory failure with COVID-19 viral pneumonia and fluid overload  - On RA SpO2   - ddimer 345, BNP 3K  - trop x 1 negative  - received remdesivir x 4days   - received steroidsdaily x 10 days   - continue iv lasix 40 mg daily      #Valvular Heart disease--AV cusion defect s/p AV recon and MVR (revised in july 2020) with new onset AF with RVR  #Acute decompensated heart failure / volume overload  - s/p cardioversion  - AC on coumadin already  - rate control with Lopressor 25 mg Q8  - continue with his home coumadin dose   - needs INR checked in 5 days-- 11/30       TSH mildly elevated will just repeat TSH in 4 weeks.   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     #Acute hypoxic respiratory failure with COVID-19 viral pneumonia and fluid overload  - On RA SpO2   - ddimer 345, BNP 3K  - trop x 1 negative  - received remdesivir x 4days   - received steroidsdaily x 10 days   - continue iv lasix 40 mg daily      #Valvular Heart disease--AV cusion defect s/p AV recon and MVR (revised in july 2020) with new onset AF with RVR  #Acute decompensated heart failure / volume overload  - s/p cardioversion  - AC on coumadin already  - rate control with Lopressor 25 mg Q8  - continue with his home coumadin dose   - needs INR checked in 5 days-- 11/30       TSH mildly elevated will just repeat TSH in 4 weeks.  will follow with coumadin clinic next week

## 2020-11-24 NOTE — PROGRESS NOTE ADULT - ATTENDING COMMENTS
Patient seen and examined independently. Agree with resident note.  # Covid PNA-- Patient is comfortable on RA and will continue with prednisone po. no further remdesivir after today. CRP 17.8 and TSH mildly elevated will just repeat TSH in 4 weeks.  # rapid afib stable HR on metoprolol 25mg q8h.  # MVR will take coumadin 2mg for tonight and then resume home dose -- needs INR checked in 5 days-- 11/30  Dc home today-- needs pulse oximeter at Dc spent more than 30mins.

## 2020-11-27 LAB
CULTURE RESULTS: SIGNIFICANT CHANGE UP
SPECIMEN SOURCE: SIGNIFICANT CHANGE UP

## 2020-11-30 ENCOUNTER — OUTPATIENT (OUTPATIENT)
Dept: OUTPATIENT SERVICES | Facility: HOSPITAL | Age: 20
LOS: 1 days | Discharge: HOME | End: 2020-11-30

## 2020-11-30 ENCOUNTER — APPOINTMENT (OUTPATIENT)
Dept: MEDICATION MANAGEMENT | Facility: CLINIC | Age: 20
End: 2020-11-30

## 2020-11-30 DIAGNOSIS — R94.6 ABNORMAL RESULTS OF THYROID FUNCTION STUDIES: ICD-10-CM

## 2020-11-30 DIAGNOSIS — Z82.49 FAMILY HISTORY OF ISCHEMIC HEART DISEASE AND OTHER DISEASES OF THE CIRCULATORY SYSTEM: ICD-10-CM

## 2020-11-30 DIAGNOSIS — Z95.2 PRESENCE OF PROSTHETIC HEART VALVE: Chronic | ICD-10-CM

## 2020-11-30 DIAGNOSIS — L40.9 PSORIASIS, UNSPECIFIED: ICD-10-CM

## 2020-11-30 DIAGNOSIS — E03.9 HYPOTHYROIDISM, UNSPECIFIED: ICD-10-CM

## 2020-11-30 DIAGNOSIS — Q90.9 DOWN SYNDROME, UNSPECIFIED: ICD-10-CM

## 2020-11-30 DIAGNOSIS — F09 UNSPECIFIED MENTAL DISORDER DUE TO KNOWN PHYSIOLOGICAL CONDITION: ICD-10-CM

## 2020-11-30 DIAGNOSIS — U07.1 COVID-19: ICD-10-CM

## 2020-11-30 DIAGNOSIS — Z79.890 HORMONE REPLACEMENT THERAPY: ICD-10-CM

## 2020-11-30 DIAGNOSIS — J96.01 ACUTE RESPIRATORY FAILURE WITH HYPOXIA: ICD-10-CM

## 2020-11-30 DIAGNOSIS — L65.9 NONSCARRING HAIR LOSS, UNSPECIFIED: ICD-10-CM

## 2020-11-30 DIAGNOSIS — Z95.2 PRESENCE OF PROSTHETIC HEART VALVE: ICD-10-CM

## 2020-11-30 DIAGNOSIS — R45.1 RESTLESSNESS AND AGITATION: ICD-10-CM

## 2020-11-30 DIAGNOSIS — Z79.01 LONG TERM (CURRENT) USE OF ANTICOAGULANTS: ICD-10-CM

## 2020-11-30 DIAGNOSIS — Q21.2 ATRIOVENTRICULAR SEPTAL DEFECT: Chronic | ICD-10-CM

## 2020-11-30 DIAGNOSIS — Q24.8 OTHER SPECIFIED CONGENITAL MALFORMATIONS OF HEART: ICD-10-CM

## 2020-11-30 DIAGNOSIS — R79.1 ABNORMAL COAGULATION PROFILE: ICD-10-CM

## 2020-11-30 DIAGNOSIS — R07.89 OTHER CHEST PAIN: ICD-10-CM

## 2020-11-30 DIAGNOSIS — Z98.890 OTHER SPECIFIED POSTPROCEDURAL STATES: ICD-10-CM

## 2020-11-30 DIAGNOSIS — I50.9 HEART FAILURE, UNSPECIFIED: ICD-10-CM

## 2020-11-30 DIAGNOSIS — I45.2 BIFASCICULAR BLOCK: ICD-10-CM

## 2020-11-30 DIAGNOSIS — I48.91 UNSPECIFIED ATRIAL FIBRILLATION: ICD-10-CM

## 2020-11-30 DIAGNOSIS — F79 UNSPECIFIED INTELLECTUAL DISABILITIES: ICD-10-CM

## 2020-11-30 DIAGNOSIS — J12.89 OTHER VIRAL PNEUMONIA: ICD-10-CM

## 2020-11-30 LAB
INR PPP: 5.9 RATIO
POCT-PROTHROMBIN TIME: 70.9 SECS
QUALITY CONTROL: YES

## 2020-12-02 ENCOUNTER — APPOINTMENT (OUTPATIENT)
Dept: MEDICATION MANAGEMENT | Facility: CLINIC | Age: 20
End: 2020-12-02

## 2020-12-02 ENCOUNTER — OUTPATIENT (OUTPATIENT)
Dept: OUTPATIENT SERVICES | Facility: HOSPITAL | Age: 20
LOS: 1 days | Discharge: HOME | End: 2020-12-02

## 2020-12-02 DIAGNOSIS — Z79.01 LONG TERM (CURRENT) USE OF ANTICOAGULANTS: ICD-10-CM

## 2020-12-02 DIAGNOSIS — Z95.2 PRESENCE OF PROSTHETIC HEART VALVE: Chronic | ICD-10-CM

## 2020-12-02 DIAGNOSIS — Z95.2 PRESENCE OF PROSTHETIC HEART VALVE: ICD-10-CM

## 2020-12-02 DIAGNOSIS — Z95.3 PRESENCE OF XENOGENIC HEART VALVE: ICD-10-CM

## 2020-12-02 DIAGNOSIS — Q21.2 ATRIOVENTRICULAR SEPTAL DEFECT: Chronic | ICD-10-CM

## 2020-12-07 ENCOUNTER — APPOINTMENT (OUTPATIENT)
Dept: MEDICATION MANAGEMENT | Facility: CLINIC | Age: 20
End: 2020-12-07

## 2020-12-07 ENCOUNTER — OUTPATIENT (OUTPATIENT)
Dept: OUTPATIENT SERVICES | Facility: HOSPITAL | Age: 20
LOS: 1 days | Discharge: HOME | End: 2020-12-07

## 2020-12-07 DIAGNOSIS — Q21.2 ATRIOVENTRICULAR SEPTAL DEFECT: Chronic | ICD-10-CM

## 2020-12-07 DIAGNOSIS — Z95.3 PRESENCE OF XENOGENIC HEART VALVE: ICD-10-CM

## 2020-12-07 DIAGNOSIS — Z95.2 PRESENCE OF PROSTHETIC HEART VALVE: Chronic | ICD-10-CM

## 2020-12-07 DIAGNOSIS — Z79.01 LONG TERM (CURRENT) USE OF ANTICOAGULANTS: ICD-10-CM

## 2020-12-07 LAB
INR PPP: 2.8 RATIO
INR PPP: 4.9 RATIO
POCT-PROTHROMBIN TIME: 33.9 SECS
POCT-PROTHROMBIN TIME: 59.1 SECS
QUALITY CONTROL: YES
QUALITY CONTROL: YES

## 2020-12-09 ENCOUNTER — EMERGENCY (EMERGENCY)
Facility: HOSPITAL | Age: 20
LOS: 0 days | Discharge: HOME | End: 2020-12-09
Attending: STUDENT IN AN ORGANIZED HEALTH CARE EDUCATION/TRAINING PROGRAM
Payer: MEDICARE

## 2020-12-09 ENCOUNTER — TRANSCRIPTION ENCOUNTER (OUTPATIENT)
Age: 20
End: 2020-12-09

## 2020-12-09 VITALS — TEMPERATURE: 98 F

## 2020-12-09 VITALS
OXYGEN SATURATION: 97 % | SYSTOLIC BLOOD PRESSURE: 125 MMHG | RESPIRATION RATE: 20 BRPM | DIASTOLIC BLOOD PRESSURE: 96 MMHG | HEART RATE: 88 BPM

## 2020-12-09 DIAGNOSIS — Z79.52 LONG TERM (CURRENT) USE OF SYSTEMIC STEROIDS: ICD-10-CM

## 2020-12-09 DIAGNOSIS — Z79.899 OTHER LONG TERM (CURRENT) DRUG THERAPY: ICD-10-CM

## 2020-12-09 DIAGNOSIS — Z79.01 LONG TERM (CURRENT) USE OF ANTICOAGULANTS: ICD-10-CM

## 2020-12-09 DIAGNOSIS — E03.9 HYPOTHYROIDISM, UNSPECIFIED: ICD-10-CM

## 2020-12-09 DIAGNOSIS — Q21.2 ATRIOVENTRICULAR SEPTAL DEFECT: Chronic | ICD-10-CM

## 2020-12-09 DIAGNOSIS — Z95.2 PRESENCE OF PROSTHETIC HEART VALVE: ICD-10-CM

## 2020-12-09 DIAGNOSIS — I48.91 UNSPECIFIED ATRIAL FIBRILLATION: ICD-10-CM

## 2020-12-09 DIAGNOSIS — Q90.9 DOWN SYNDROME, UNSPECIFIED: ICD-10-CM

## 2020-12-09 DIAGNOSIS — L03.011 CELLULITIS OF RIGHT FINGER: ICD-10-CM

## 2020-12-09 DIAGNOSIS — U07.1 COVID-19: ICD-10-CM

## 2020-12-09 DIAGNOSIS — Z95.2 PRESENCE OF PROSTHETIC HEART VALVE: Chronic | ICD-10-CM

## 2020-12-09 DIAGNOSIS — Z87.2 PERSONAL HISTORY OF DISEASES OF THE SKIN AND SUBCUTANEOUS TISSUE: ICD-10-CM

## 2020-12-09 DIAGNOSIS — M79.644 PAIN IN RIGHT FINGER(S): ICD-10-CM

## 2020-12-09 PROCEDURE — 99284 EMERGENCY DEPT VISIT MOD MDM: CPT | Mod: 25

## 2020-12-09 PROCEDURE — 10060 I&D ABSCESS SIMPLE/SINGLE: CPT

## 2020-12-09 RX ORDER — ETHYL CHLORIDE
1 AEROSOL, SPRAY (ML) TOPICAL ONCE
Refills: 0 | Status: COMPLETED | OUTPATIENT
Start: 2020-12-09 | End: 2020-12-09

## 2020-12-09 RX ADMIN — Medication 1 APPLICATION(S): at 20:44

## 2020-12-09 RX ADMIN — Medication 2 MILLIGRAM(S): at 17:37

## 2020-12-09 RX ADMIN — Medication 2 MILLIGRAM(S): at 18:53

## 2020-12-09 NOTE — ED ADULT TRIAGE NOTE - CHIEF COMPLAINT QUOTE
Presented to ED with mom for possible right middle finger infection. Mom states, pt. is COVID + 2 weeks ago.

## 2020-12-09 NOTE — ED PROVIDER NOTE - ATTENDING CONTRIBUTION TO CARE
20 y.o. male with a PMH of Downs Syndrome, Alopecia, Psoriasis, A. fib and Mitral Valve Replacement who presents today with R middle finger swelling. Mom states that it has been occurring for the past three days. Mom denies any fevers, chills, nausea, vomiting or any other complaints.     VITAL SIGNS: I have reviewed nursing notes and confirm.  CONSTITUTIONAL: non-toxic, well appearing  SKIN: no rash, no petechiae.  EYES: PERRL, EOMI, pink conjunctiva, anicteric  ENT: tongue midline, no exudates, MMM  NECK: Supple; no meningismus, no JVD  CARD: RRR, no murmurs, equal radial pulses bilaterally 2+  RESP: CTAB, no respiratory distress  ABD: Soft, non-tender, non-distended, no peritoneal signs, no HSM, no CVA tenderness  EXT: Normal ROM x4. No edema. No calves tenderness.  paronychia noted in the R finger, pain to palpation on exam.   NEURO: Alert, oriented. CN2-12 intact, equal strength bilaterally, nl gait.  PSYCH: Cooperative, appropriate.    a/p  20 yr old m that presents with R paronychia. Attempted to drain area, was only able to drain a small area (pt was agitated, attempted ativan with minimal response). Will dc with instructions to return in 48 hrs for wound check. Will dc with antibiotics, given wound care instructions and strict return precautions.

## 2020-12-09 NOTE — ED PROVIDER NOTE - CLINICAL SUMMARY MEDICAL DECISION MAKING FREE TEXT BOX
20 yr old m that presents with R paronychia. Attempted to drain area, was only able to drain a small area (pt was agitated, attempted ativan with minimal response). Will dc with instructions to return in 48 hrs for wound check. Will dc with antibiotics, given wound care instructions and strict return precautions.

## 2020-12-09 NOTE — ED PROVIDER NOTE - PATIENT PORTAL LINK FT
You can access the FollowMyHealth Patient Portal offered by [facility] by registering at the following website: [email or web address]. By joining [facility]’s FollowMyHealth portal, you will also be able to view your health information using other applications (apps) compatible with our system.

## 2020-12-09 NOTE — ED PROVIDER NOTE - OBJECTIVE STATEMENT
20 y.o. male with a PMH of Downs Syndrome, Alopecia, Psoriasis, A. fib and Mitral Valve Replacement presented to the ER with mother for swelling and redness to Right middle finger for the past 2 days.  No fever, chills.  Pt is a nail biter.  [facility].  Scheduled for appt. Friday.

## 2020-12-09 NOTE — ED ADULT NURSE NOTE - OBJECTIVE STATEMENT
Presented to ED with mom for possible right middle finger infection. Denies f/n/v/d. As per mom, pt. was COVID + 2 weeks ago.

## 2020-12-09 NOTE — ED ADULT NURSE NOTE - NSIMPLEMENTINTERV_GEN_ALL_ED
Implemented All Fall with Harm Risk Interventions:  [place] to call system. Call bell, personal items and telephone within reach. Instruct patient to call for assistance. Room bathroom lighting operational. Non-slip footwear when patient is off stretcher. Physically safe environment: no spills, clutter or unnecessary equipment. Stretcher in lowest position, wheels locked, appropriate side rails in place. Provide visual cue, wrist band, yellow gown, etc. Monitor gait and stability. Monitor for mental status changes and reorient to person, place, and time. Review medications for side effects contributing to fall risk. Reinforce activity limits and safety measures with patient and family. Provide visual clues: red socks.

## 2020-12-09 NOTE — ED PROVIDER NOTE - NSFOLLOWUPINSTRUCTIONS_ED_ALL_ED_FT
Paronychia      Paronychia is an infection of the skin. It happens near a fingernail or toenail. It may cause pain and swelling around the nail. In some cases, a fluid-filled bump (abscess) can form near or under the nail.    Usually, this condition is not serious, and it clears up with treatment.      Follow these instructions at home:    Wound care     •Keep the affected area clean.      •Soak the fingers or toes in warm water as told by your doctor. You may be told to do this for 20 minutes, 2–3 times a day.      •Keep the area dry when you are not soaking it.      • Do not try to drain a fluid-filled bump on your own.    •Follow instructions from your doctor about how to take care of the affected area. Make sure you:  •Wash your hands with soap and water before you change your bandage (dressing). If you cannot use soap and water, use hand .      •Change your bandage as told by your doctor.      •If you had a fluid-filled bump and your doctor drained it, check the area every day for signs of infection. Check for:  •Redness, swelling, or pain.       •Fluid or blood.       •Warmth.       •Pus or a bad smell.          Medicines      •Take over-the-counter and prescription medicines only as told by your doctor.       •If you were prescribed an antibiotic medicine, take it as told by your doctor. Do not stop taking it even if you start to feel better.      General instructions     •Avoid touching any chemicals.      • Do not pick at the affected area.      Prevention   •To prevent this condition from happening again:  •Wear rubber gloves when putting your hands in water for washing dishes or other tasks.      •Wear gloves if your hands might touch  or chemicals.      •Avoid injuring your nails or fingertips.       •Do not bite your nails or tear hangnails.      •Do not cut your nails very short.      •Do not cut the skin at the base and sides of the nail (cuticles).      •Use clean nail clippers or scissors when trimming nails.          Contact a doctor if:    •You feel worse.      •You do not get better.      •You have more fluid, blood, or pus coming from the affected area.      •Your finger or knuckle is swollen or is hard to move.        Get help right away if you have:    •A fever or chills.      •Redness spreading from the affected area.      •Pain in a joint or muscle.        Summary    •Paronychia is an infection of the skin. It happens near a fingernail or toenail.      •This condition may cause pain and swelling around the nail.      •Soak the fingers or toes in warm water as told by your doctor.      •Usually, this condition is not serious, and it clears up with treatment.      This information is not intended to replace advice given to you by your health care provider. Make sure you discuss any questions you have with your health care provider.      Document Revised: 01/04/2019 Document Reviewed: 12/31/2018    Elsevier Patient Education © 2020 [employer]

## 2020-12-11 ENCOUNTER — APPOINTMENT (OUTPATIENT)
Dept: MEDICATION MANAGEMENT | Facility: CLINIC | Age: 20
End: 2020-12-11

## 2020-12-11 ENCOUNTER — OUTPATIENT (OUTPATIENT)
Dept: OUTPATIENT SERVICES | Facility: HOSPITAL | Age: 20
LOS: 1 days | Discharge: HOME | End: 2020-12-11

## 2020-12-11 DIAGNOSIS — Q21.2 ATRIOVENTRICULAR SEPTAL DEFECT: Chronic | ICD-10-CM

## 2020-12-11 DIAGNOSIS — Z95.2 PRESENCE OF PROSTHETIC HEART VALVE: Chronic | ICD-10-CM

## 2020-12-11 DIAGNOSIS — Z95.3 PRESENCE OF XENOGENIC HEART VALVE: ICD-10-CM

## 2020-12-11 DIAGNOSIS — Z79.01 LONG TERM (CURRENT) USE OF ANTICOAGULANTS: ICD-10-CM

## 2020-12-11 LAB
INR PPP: 1.8 RATIO
POCT-PROTHROMBIN TIME: 21.9 SECS
QUALITY CONTROL: YES

## 2020-12-15 ENCOUNTER — APPOINTMENT (OUTPATIENT)
Dept: MEDICATION MANAGEMENT | Facility: CLINIC | Age: 20
End: 2020-12-15

## 2020-12-15 ENCOUNTER — OUTPATIENT (OUTPATIENT)
Dept: OUTPATIENT SERVICES | Facility: HOSPITAL | Age: 20
LOS: 1 days | Discharge: HOME | End: 2020-12-15

## 2020-12-15 DIAGNOSIS — Z95.2 PRESENCE OF PROSTHETIC HEART VALVE: Chronic | ICD-10-CM

## 2020-12-15 DIAGNOSIS — Q21.2 ATRIOVENTRICULAR SEPTAL DEFECT: Chronic | ICD-10-CM

## 2020-12-15 DIAGNOSIS — Z79.01 LONG TERM (CURRENT) USE OF ANTICOAGULANTS: ICD-10-CM

## 2020-12-15 DIAGNOSIS — Z95.3 PRESENCE OF XENOGENIC HEART VALVE: ICD-10-CM

## 2020-12-15 LAB
INR PPP: 2 RATIO
POCT-PROTHROMBIN TIME: 24.1 SECS
QUALITY CONTROL: YES

## 2020-12-21 ENCOUNTER — APPOINTMENT (OUTPATIENT)
Dept: MEDICATION MANAGEMENT | Facility: CLINIC | Age: 20
End: 2020-12-21

## 2020-12-21 ENCOUNTER — OUTPATIENT (OUTPATIENT)
Dept: OUTPATIENT SERVICES | Facility: HOSPITAL | Age: 20
LOS: 1 days | Discharge: HOME | End: 2020-12-21

## 2020-12-21 DIAGNOSIS — Z95.2 PRESENCE OF PROSTHETIC HEART VALVE: Chronic | ICD-10-CM

## 2020-12-21 DIAGNOSIS — Q21.2 ATRIOVENTRICULAR SEPTAL DEFECT: Chronic | ICD-10-CM

## 2020-12-21 DIAGNOSIS — Z51.81 ENCOUNTER FOR THERAPEUTIC DRUG LVL MONITORING: ICD-10-CM

## 2020-12-21 DIAGNOSIS — Z95.3 PRESENCE OF XENOGENIC HEART VALVE: ICD-10-CM

## 2020-12-21 DIAGNOSIS — Z79.01 LONG TERM (CURRENT) USE OF ANTICOAGULANTS: ICD-10-CM

## 2020-12-21 LAB
INR PPP: 3 RATIO
POCT-PROTHROMBIN TIME: 36 SECS
QUALITY CONTROL: YES

## 2020-12-28 ENCOUNTER — APPOINTMENT (OUTPATIENT)
Age: 20
End: 2020-12-28

## 2020-12-28 ENCOUNTER — OUTPATIENT (OUTPATIENT)
Dept: OUTPATIENT SERVICES | Facility: HOSPITAL | Age: 20
LOS: 1 days | Discharge: HOME | End: 2020-12-28

## 2020-12-28 DIAGNOSIS — Q21.2 ATRIOVENTRICULAR SEPTAL DEFECT: Chronic | ICD-10-CM

## 2020-12-28 DIAGNOSIS — Z95.2 PRESENCE OF PROSTHETIC HEART VALVE: Chronic | ICD-10-CM

## 2020-12-28 DIAGNOSIS — Z79.01 LONG TERM (CURRENT) USE OF ANTICOAGULANTS: ICD-10-CM

## 2020-12-28 DIAGNOSIS — Z95.3 PRESENCE OF XENOGENIC HEART VALVE: ICD-10-CM

## 2020-12-28 LAB
INR PPP: 2.3 RATIO
POCT-PROTHROMBIN TIME: 27.5 SECS
QUALITY CONTROL: YES

## 2021-01-05 ENCOUNTER — APPOINTMENT (OUTPATIENT)
Dept: MEDICATION MANAGEMENT | Facility: CLINIC | Age: 21
End: 2021-01-05

## 2021-01-05 ENCOUNTER — OUTPATIENT (OUTPATIENT)
Dept: OUTPATIENT SERVICES | Facility: HOSPITAL | Age: 21
LOS: 1 days | Discharge: HOME | End: 2021-01-05

## 2021-01-05 VITALS — WEIGHT: 165 LBS | RESPIRATION RATE: 16 BRPM

## 2021-01-05 DIAGNOSIS — Z95.3 PRESENCE OF XENOGENIC HEART VALVE: ICD-10-CM

## 2021-01-05 DIAGNOSIS — Z79.01 LONG TERM (CURRENT) USE OF ANTICOAGULANTS: ICD-10-CM

## 2021-01-05 DIAGNOSIS — Q21.2 ATRIOVENTRICULAR SEPTAL DEFECT: Chronic | ICD-10-CM

## 2021-01-05 DIAGNOSIS — Z95.2 PRESENCE OF PROSTHETIC HEART VALVE: Chronic | ICD-10-CM

## 2021-01-05 LAB
INR PPP: 3.5 RATIO
POCT-PROTHROMBIN TIME: 41.6 SECS
QUALITY CONTROL: YES

## 2021-01-13 ENCOUNTER — APPOINTMENT (OUTPATIENT)
Dept: MEDICATION MANAGEMENT | Facility: CLINIC | Age: 21
End: 2021-01-13

## 2021-01-13 ENCOUNTER — OUTPATIENT (OUTPATIENT)
Dept: OUTPATIENT SERVICES | Facility: HOSPITAL | Age: 21
LOS: 1 days | Discharge: HOME | End: 2021-01-13

## 2021-01-13 DIAGNOSIS — Z95.2 PRESENCE OF PROSTHETIC HEART VALVE: Chronic | ICD-10-CM

## 2021-01-13 DIAGNOSIS — Z79.01 LONG TERM (CURRENT) USE OF ANTICOAGULANTS: ICD-10-CM

## 2021-01-13 DIAGNOSIS — Z95.3 PRESENCE OF XENOGENIC HEART VALVE: ICD-10-CM

## 2021-01-13 DIAGNOSIS — Q21.2 ATRIOVENTRICULAR SEPTAL DEFECT: Chronic | ICD-10-CM

## 2021-01-13 LAB
INR PPP: 2.9 RATIO
POCT-PROTHROMBIN TIME: 34.5 SECS
QUALITY CONTROL: YES

## 2021-01-26 ENCOUNTER — APPOINTMENT (OUTPATIENT)
Dept: MEDICATION MANAGEMENT | Facility: CLINIC | Age: 21
End: 2021-01-26

## 2021-01-26 ENCOUNTER — OUTPATIENT (OUTPATIENT)
Dept: OUTPATIENT SERVICES | Facility: HOSPITAL | Age: 21
LOS: 1 days | Discharge: HOME | End: 2021-01-26

## 2021-01-26 DIAGNOSIS — Q21.2 ATRIOVENTRICULAR SEPTAL DEFECT: Chronic | ICD-10-CM

## 2021-01-26 DIAGNOSIS — Z79.01 LONG TERM (CURRENT) USE OF ANTICOAGULANTS: ICD-10-CM

## 2021-01-26 DIAGNOSIS — Z95.3 PRESENCE OF XENOGENIC HEART VALVE: ICD-10-CM

## 2021-01-26 DIAGNOSIS — Z95.2 PRESENCE OF PROSTHETIC HEART VALVE: Chronic | ICD-10-CM

## 2021-01-26 LAB
INR PPP: 4.8 RATIO
POCT-PROTHROMBIN TIME: 57.4 SECS
QUALITY CONTROL: YES

## 2021-02-01 ENCOUNTER — APPOINTMENT (OUTPATIENT)
Dept: MEDICATION MANAGEMENT | Facility: CLINIC | Age: 21
End: 2021-02-01

## 2021-02-03 ENCOUNTER — OUTPATIENT (OUTPATIENT)
Dept: OUTPATIENT SERVICES | Facility: HOSPITAL | Age: 21
LOS: 1 days | Discharge: HOME | End: 2021-02-03

## 2021-02-03 ENCOUNTER — APPOINTMENT (OUTPATIENT)
Dept: MEDICATION MANAGEMENT | Facility: CLINIC | Age: 21
End: 2021-02-03

## 2021-02-03 DIAGNOSIS — Q21.2 ATRIOVENTRICULAR SEPTAL DEFECT: Chronic | ICD-10-CM

## 2021-02-03 DIAGNOSIS — Z79.01 LONG TERM (CURRENT) USE OF ANTICOAGULANTS: ICD-10-CM

## 2021-02-03 DIAGNOSIS — Z95.3 PRESENCE OF XENOGENIC HEART VALVE: ICD-10-CM

## 2021-02-03 DIAGNOSIS — Z95.2 PRESENCE OF PROSTHETIC HEART VALVE: Chronic | ICD-10-CM

## 2021-02-03 LAB
INR PPP: 4.4 RATIO
POCT-PROTHROMBIN TIME: 53.4 SECS
QUALITY CONTROL: YES

## 2021-02-11 ENCOUNTER — OUTPATIENT (OUTPATIENT)
Dept: OUTPATIENT SERVICES | Facility: HOSPITAL | Age: 21
LOS: 1 days | Discharge: HOME | End: 2021-02-11

## 2021-02-11 ENCOUNTER — APPOINTMENT (OUTPATIENT)
Dept: MEDICATION MANAGEMENT | Facility: CLINIC | Age: 21
End: 2021-02-11

## 2021-02-11 DIAGNOSIS — Z79.01 LONG TERM (CURRENT) USE OF ANTICOAGULANTS: ICD-10-CM

## 2021-02-11 DIAGNOSIS — Q21.2 ATRIOVENTRICULAR SEPTAL DEFECT: Chronic | ICD-10-CM

## 2021-02-11 DIAGNOSIS — Z95.2 PRESENCE OF PROSTHETIC HEART VALVE: Chronic | ICD-10-CM

## 2021-02-11 DIAGNOSIS — Z95.3 PRESENCE OF XENOGENIC HEART VALVE: ICD-10-CM

## 2021-02-11 LAB
INR PPP: 2.4 RATIO
POCT-PROTHROMBIN TIME: 29.2 SECS
QUALITY CONTROL: YES

## 2021-02-23 ENCOUNTER — APPOINTMENT (OUTPATIENT)
Dept: MEDICATION MANAGEMENT | Facility: CLINIC | Age: 21
End: 2021-02-23

## 2021-02-23 ENCOUNTER — OUTPATIENT (OUTPATIENT)
Dept: OUTPATIENT SERVICES | Facility: HOSPITAL | Age: 21
LOS: 1 days | Discharge: HOME | End: 2021-02-23

## 2021-02-23 DIAGNOSIS — Z95.2 PRESENCE OF PROSTHETIC HEART VALVE: Chronic | ICD-10-CM

## 2021-02-23 DIAGNOSIS — Z79.01 LONG TERM (CURRENT) USE OF ANTICOAGULANTS: ICD-10-CM

## 2021-02-23 DIAGNOSIS — Z95.3 PRESENCE OF XENOGENIC HEART VALVE: ICD-10-CM

## 2021-02-23 DIAGNOSIS — Q21.2 ATRIOVENTRICULAR SEPTAL DEFECT: Chronic | ICD-10-CM

## 2021-02-23 LAB
INR PPP: 3.8 RATIO
POCT-PROTHROMBIN TIME: 45.3 SECS
QUALITY CONTROL: YES

## 2021-03-02 ENCOUNTER — OUTPATIENT (OUTPATIENT)
Dept: OUTPATIENT SERVICES | Facility: HOSPITAL | Age: 21
LOS: 1 days | Discharge: HOME | End: 2021-03-02

## 2021-03-02 ENCOUNTER — APPOINTMENT (OUTPATIENT)
Dept: MEDICATION MANAGEMENT | Facility: CLINIC | Age: 21
End: 2021-03-02

## 2021-03-02 DIAGNOSIS — Z95.3 PRESENCE OF XENOGENIC HEART VALVE: ICD-10-CM

## 2021-03-02 DIAGNOSIS — Q21.2 ATRIOVENTRICULAR SEPTAL DEFECT: Chronic | ICD-10-CM

## 2021-03-02 DIAGNOSIS — Z79.01 LONG TERM (CURRENT) USE OF ANTICOAGULANTS: ICD-10-CM

## 2021-03-02 DIAGNOSIS — Z95.2 PRESENCE OF PROSTHETIC HEART VALVE: Chronic | ICD-10-CM

## 2021-03-02 LAB
INR PPP: 2.5 RATIO
POCT-PROTHROMBIN TIME: 29.6 SECS
QUALITY CONTROL: YES

## 2021-03-09 ENCOUNTER — OUTPATIENT (OUTPATIENT)
Dept: OUTPATIENT SERVICES | Facility: HOSPITAL | Age: 21
LOS: 1 days | Discharge: HOME | End: 2021-03-09

## 2021-03-09 ENCOUNTER — APPOINTMENT (OUTPATIENT)
Dept: MEDICATION MANAGEMENT | Facility: CLINIC | Age: 21
End: 2021-03-09

## 2021-03-09 DIAGNOSIS — Z79.01 LONG TERM (CURRENT) USE OF ANTICOAGULANTS: ICD-10-CM

## 2021-03-09 DIAGNOSIS — Z95.3 PRESENCE OF XENOGENIC HEART VALVE: ICD-10-CM

## 2021-03-09 DIAGNOSIS — Z95.2 PRESENCE OF PROSTHETIC HEART VALVE: Chronic | ICD-10-CM

## 2021-03-09 DIAGNOSIS — Q21.2 ATRIOVENTRICULAR SEPTAL DEFECT: Chronic | ICD-10-CM

## 2021-03-09 LAB
INR PPP: 2.7 RATIO
POCT-PROTHROMBIN TIME: 31.9 SECS
QUALITY CONTROL: YES

## 2021-03-23 ENCOUNTER — OUTPATIENT (OUTPATIENT)
Dept: OUTPATIENT SERVICES | Facility: HOSPITAL | Age: 21
LOS: 1 days | Discharge: HOME | End: 2021-03-23

## 2021-03-23 ENCOUNTER — APPOINTMENT (OUTPATIENT)
Dept: MEDICATION MANAGEMENT | Facility: CLINIC | Age: 21
End: 2021-03-23

## 2021-03-23 VITALS — RESPIRATION RATE: 16 BRPM | OXYGEN SATURATION: 98 % | HEART RATE: 72 BPM

## 2021-03-23 DIAGNOSIS — Z79.01 LONG TERM (CURRENT) USE OF ANTICOAGULANTS: ICD-10-CM

## 2021-03-23 DIAGNOSIS — Q21.2 ATRIOVENTRICULAR SEPTAL DEFECT: Chronic | ICD-10-CM

## 2021-03-23 DIAGNOSIS — Z95.2 PRESENCE OF PROSTHETIC HEART VALVE: Chronic | ICD-10-CM

## 2021-03-23 DIAGNOSIS — Z95.3 PRESENCE OF XENOGENIC HEART VALVE: ICD-10-CM

## 2021-03-23 LAB
INR PPP: 2.3 RATIO
POCT-PROTHROMBIN TIME: 27.4 SECS
QUALITY CONTROL: YES

## 2021-04-06 ENCOUNTER — OUTPATIENT (OUTPATIENT)
Dept: OUTPATIENT SERVICES | Facility: HOSPITAL | Age: 21
LOS: 1 days | Discharge: HOME | End: 2021-04-06

## 2021-04-06 ENCOUNTER — APPOINTMENT (OUTPATIENT)
Dept: MEDICATION MANAGEMENT | Facility: CLINIC | Age: 21
End: 2021-04-06

## 2021-04-06 DIAGNOSIS — Z95.3 PRESENCE OF XENOGENIC HEART VALVE: ICD-10-CM

## 2021-04-06 DIAGNOSIS — Q21.2 ATRIOVENTRICULAR SEPTAL DEFECT: Chronic | ICD-10-CM

## 2021-04-06 DIAGNOSIS — Z79.01 LONG TERM (CURRENT) USE OF ANTICOAGULANTS: ICD-10-CM

## 2021-04-06 DIAGNOSIS — Z95.2 PRESENCE OF PROSTHETIC HEART VALVE: Chronic | ICD-10-CM

## 2021-04-06 LAB
INR PPP: 1.5 RATIO
POCT-PROTHROMBIN TIME: 17.5 SECS
QUALITY CONTROL: YES

## 2021-04-13 ENCOUNTER — APPOINTMENT (OUTPATIENT)
Dept: MEDICATION MANAGEMENT | Facility: CLINIC | Age: 21
End: 2021-04-13

## 2021-04-13 ENCOUNTER — OUTPATIENT (OUTPATIENT)
Dept: OUTPATIENT SERVICES | Facility: HOSPITAL | Age: 21
LOS: 1 days | Discharge: HOME | End: 2021-04-13

## 2021-04-13 VITALS — HEART RATE: 70 BPM | OXYGEN SATURATION: 97 % | RESPIRATION RATE: 16 BRPM

## 2021-04-13 DIAGNOSIS — Z95.2 PRESENCE OF PROSTHETIC HEART VALVE: Chronic | ICD-10-CM

## 2021-04-13 DIAGNOSIS — Z79.01 LONG TERM (CURRENT) USE OF ANTICOAGULANTS: ICD-10-CM

## 2021-04-13 DIAGNOSIS — Q21.2 ATRIOVENTRICULAR SEPTAL DEFECT: Chronic | ICD-10-CM

## 2021-04-13 DIAGNOSIS — Z95.3 PRESENCE OF XENOGENIC HEART VALVE: ICD-10-CM

## 2021-04-13 LAB
INR PPP: 2.6 RATIO
POCT-PROTHROMBIN TIME: 31.7 SECS
QUALITY CONTROL: YES

## 2021-04-20 ENCOUNTER — OUTPATIENT (OUTPATIENT)
Dept: OUTPATIENT SERVICES | Facility: HOSPITAL | Age: 21
LOS: 1 days | Discharge: HOME | End: 2021-04-20

## 2021-04-20 ENCOUNTER — APPOINTMENT (OUTPATIENT)
Dept: MEDICATION MANAGEMENT | Facility: CLINIC | Age: 21
End: 2021-04-20

## 2021-04-20 VITALS — HEART RATE: 75 BPM | RESPIRATION RATE: 18 BRPM | OXYGEN SATURATION: 94 %

## 2021-04-20 DIAGNOSIS — Z79.01 LONG TERM (CURRENT) USE OF ANTICOAGULANTS: ICD-10-CM

## 2021-04-20 DIAGNOSIS — Z95.2 PRESENCE OF PROSTHETIC HEART VALVE: Chronic | ICD-10-CM

## 2021-04-20 DIAGNOSIS — Z95.3 PRESENCE OF XENOGENIC HEART VALVE: ICD-10-CM

## 2021-04-20 DIAGNOSIS — Q21.2 ATRIOVENTRICULAR SEPTAL DEFECT: Chronic | ICD-10-CM

## 2021-04-20 LAB
INR PPP: 2.5 RATIO
POCT-PROTHROMBIN TIME: 30.6 SECS
QUALITY CONTROL: YES

## 2021-04-27 ENCOUNTER — OUTPATIENT (OUTPATIENT)
Dept: OUTPATIENT SERVICES | Facility: HOSPITAL | Age: 21
LOS: 1 days | Discharge: HOME | End: 2021-04-27

## 2021-04-27 ENCOUNTER — APPOINTMENT (OUTPATIENT)
Dept: MEDICATION MANAGEMENT | Facility: CLINIC | Age: 21
End: 2021-04-27

## 2021-04-27 DIAGNOSIS — Z95.3 PRESENCE OF XENOGENIC HEART VALVE: ICD-10-CM

## 2021-04-27 DIAGNOSIS — Z95.2 PRESENCE OF PROSTHETIC HEART VALVE: Chronic | ICD-10-CM

## 2021-04-27 DIAGNOSIS — Q21.2 ATRIOVENTRICULAR SEPTAL DEFECT: Chronic | ICD-10-CM

## 2021-04-27 DIAGNOSIS — Z79.01 LONG TERM (CURRENT) USE OF ANTICOAGULANTS: ICD-10-CM

## 2021-04-27 LAB
INR PPP: 2.1 RATIO
POCT-PROTHROMBIN TIME: 25.8 SECS
QUALITY CONTROL: YES

## 2021-05-04 ENCOUNTER — OUTPATIENT (OUTPATIENT)
Dept: OUTPATIENT SERVICES | Facility: HOSPITAL | Age: 21
LOS: 1 days | Discharge: HOME | End: 2021-05-04

## 2021-05-04 ENCOUNTER — APPOINTMENT (OUTPATIENT)
Dept: MEDICATION MANAGEMENT | Facility: CLINIC | Age: 21
End: 2021-05-04

## 2021-05-04 DIAGNOSIS — Q21.2 ATRIOVENTRICULAR SEPTAL DEFECT: Chronic | ICD-10-CM

## 2021-05-04 DIAGNOSIS — Z95.3 PRESENCE OF XENOGENIC HEART VALVE: ICD-10-CM

## 2021-05-04 DIAGNOSIS — Z79.01 LONG TERM (CURRENT) USE OF ANTICOAGULANTS: ICD-10-CM

## 2021-05-04 DIAGNOSIS — Z95.2 PRESENCE OF PROSTHETIC HEART VALVE: Chronic | ICD-10-CM

## 2021-05-04 LAB
INR PPP: 2.4 RATIO
POCT-PROTHROMBIN TIME: 28.5 SECS
QUALITY CONTROL: YES

## 2021-05-11 ENCOUNTER — OUTPATIENT (OUTPATIENT)
Dept: OUTPATIENT SERVICES | Facility: HOSPITAL | Age: 21
LOS: 1 days | Discharge: HOME | End: 2021-05-11

## 2021-05-11 ENCOUNTER — APPOINTMENT (OUTPATIENT)
Dept: MEDICATION MANAGEMENT | Facility: CLINIC | Age: 21
End: 2021-05-11

## 2021-05-11 DIAGNOSIS — Z95.3 PRESENCE OF XENOGENIC HEART VALVE: ICD-10-CM

## 2021-05-11 DIAGNOSIS — Q21.2 ATRIOVENTRICULAR SEPTAL DEFECT: Chronic | ICD-10-CM

## 2021-05-11 DIAGNOSIS — Z79.01 LONG TERM (CURRENT) USE OF ANTICOAGULANTS: ICD-10-CM

## 2021-05-11 DIAGNOSIS — Z95.2 PRESENCE OF PROSTHETIC HEART VALVE: Chronic | ICD-10-CM

## 2021-05-11 LAB
INR PPP: 2.9 RATIO
POCT-PROTHROMBIN TIME: 35.2 SECS
QUALITY CONTROL: YES

## 2021-05-27 ENCOUNTER — OUTPATIENT (OUTPATIENT)
Dept: OUTPATIENT SERVICES | Facility: HOSPITAL | Age: 21
LOS: 1 days | Discharge: HOME | End: 2021-05-27

## 2021-05-27 ENCOUNTER — APPOINTMENT (OUTPATIENT)
Dept: MEDICATION MANAGEMENT | Facility: CLINIC | Age: 21
End: 2021-05-27

## 2021-05-27 DIAGNOSIS — Z79.01 LONG TERM (CURRENT) USE OF ANTICOAGULANTS: ICD-10-CM

## 2021-05-27 DIAGNOSIS — Z95.2 PRESENCE OF PROSTHETIC HEART VALVE: Chronic | ICD-10-CM

## 2021-05-27 DIAGNOSIS — Z95.3 PRESENCE OF XENOGENIC HEART VALVE: ICD-10-CM

## 2021-05-27 DIAGNOSIS — Q21.2 ATRIOVENTRICULAR SEPTAL DEFECT: Chronic | ICD-10-CM

## 2021-05-27 LAB
INR PPP: 2.8 RATIO
POCT-PROTHROMBIN TIME: 33.4 SECS
QUALITY CONTROL: YES

## 2021-06-10 ENCOUNTER — OUTPATIENT (OUTPATIENT)
Dept: OUTPATIENT SERVICES | Facility: HOSPITAL | Age: 21
LOS: 1 days | Discharge: HOME | End: 2021-06-10

## 2021-06-10 ENCOUNTER — APPOINTMENT (OUTPATIENT)
Dept: MEDICATION MANAGEMENT | Facility: CLINIC | Age: 21
End: 2021-06-10

## 2021-06-10 DIAGNOSIS — Z79.01 LONG TERM (CURRENT) USE OF ANTICOAGULANTS: ICD-10-CM

## 2021-06-10 DIAGNOSIS — Z95.3 PRESENCE OF XENOGENIC HEART VALVE: ICD-10-CM

## 2021-06-10 DIAGNOSIS — Q21.2 ATRIOVENTRICULAR SEPTAL DEFECT: Chronic | ICD-10-CM

## 2021-06-10 DIAGNOSIS — Z95.2 PRESENCE OF PROSTHETIC HEART VALVE: Chronic | ICD-10-CM

## 2021-06-10 LAB
INR PPP: 3.1 RATIO
POCT-PROTHROMBIN TIME: 36.7 SECS
QUALITY CONTROL: YES

## 2021-06-15 NOTE — H&P ADULT - NSCORESITESY/N_GEN_A_CORE_RD
[facility] Consult Note    Date of Service:  1/26/2018    Requesting Provider: Dr. Rufina Gaviria    Chief Complaint: left leg ulcer    History of Present Illness: Mari Wisdom is being seen at the Vascular Clinic today regarding left leg ulcer. They arrive to the clinic today with daughter in law Chago. The patient reports that the wound first started after a fall in the shower January 15, 2018. She sought medical treatment 1/23/18 because the wound was not getting better; was started on Keflex she is tolerating this well; no side effects.  Was previously using saline spray to wash; applying triple antibiotic cream; twice per day; covering with tegaderm or oil emulsion; gauze; securing with coban. Reports pain of 0/10; touching it causes mild pain; currently using nothing for pain. Has used ace wraps as compression in the past. Denies any fevers, chills, or generalized ill feeling. Denies history of cancer. Sleeps in a bed with legs elevated. She is able to walk about 3 blocks she uses her walker at times; she is limited due to the weather. Pt still has their uterus and ovaries, they deny any abnormal vaginal bleeding. Denies history of DVT, joint replacements; cellulitis; vein procedures. She is not taking any medications; has very little medical and surgical history as listed below. She is a former smoker; smoked for 30-40 years; 1.5 ppd. Lives in a condo by herself in [place]; family will check on her daily.      Review of Systems:   Constitutional:  negative  for fever, chills or night sweats  EENTM: negative for glasses;  negative [place]  GI:  negative for nausea/vomiting;  negative for constipation  negative diarrhea;  negative for fecal incontinence negative weight loss  :  negative dysuria, incontinence  MS: patient is ambulatory;  does use assistive devices  Cardiac:  negative   Respiratory:  negative SOB  Endocrine:  negative diabetes  Psych:  negative  "depression/anxiety    Past Medical History:    Past Medical History:   Diagnosis Date     Constipation 4/28/2016     Hearing loss 4/28/2016     Insomnia 4/28/2016     Left knee DJD 4/28/2016     Osteoporosis 4/28/2016       Surgical History:   Past Surgical History:   Procedure Laterality Date     TONSILLECTOMY         Medications:    Current Outpatient Prescriptions:      cephalexin (KEFLEX) 500 MG capsule, , Disp: , Rfl:     Allergies: No Known Allergies    Family history:   Family History   Problem Relation Age of Onset     Heart disease Mother      No Medical Problems Father      unknown history     No Medical Problems Son      No Medical Problems Son      No Medical Problems Son      No Medical Problems Son      No Medical Problems Son      No Medical Problems Daughter        Social History:   Social History     Social History     Marital status:      Spouse name: N/A     Number of children: N/A     Years of education: N/A     Occupational History     Not on file.     Social History Main Topics     Smoking status: Former Smoker     Types: Cigarettes     Quit date: 1/26/1980     Smokeless tobacco: Never Used     Alcohol use 0.6 oz/week     1 Glasses of wine per week      Comment: OCCASSIONAL     Drug use: No     Sexual activity: No     Other Topics Concern     Not on file     Social History Narrative        Physical Exam  Vitals: Blood pressure 112/62, pulse 76, temperature 97.4  F (36.3  C), temperature source Oral, resp. rate 16, height 5' 1\" (1.549 m), weight 101 lb (45.8 kg), not currently breastfeeding.  General: This is a 86 y.o. female who appears their reported age, not in acute distress; very thin  Head: normocephalic, Atraumatic; not wearing glasses; non-icteric sclera; no exudate; mild hearing loss; telangiectasias about the nose  Respiratory: Clear throughout all lung fields; unlabored breathing; no cough   Cardiac: Regular, Rate and Rhythm, no murmurs appreciated   Skin: Uniformly warm and " dry  Psych: Alert and oriented x4; calm and cooperative throughout exam  Abdomen: Normal bowel sounds. Soft, symmetric, no guarding or rigidity, nontender with palpation.  No organomegaly or masses palpated.   Extremities: BLE without edema; left leg lateral surface with full thickness ulcer; portion of skin flap is still present but dark this was attached; left in place; the rest of the wound was covered with slough; this was debrided and revealed 90% viable wound bed; trace surrounding erythema; minimal pain with debridement and palpation; strength testing revealed 4/4 to BLEs. Nails are well trimmed and normal; webbing clear    Peripheral Vascular: normal dorsalis pedis, posterior tibial pulses to b/l feet , using a handheld doppler these were strong and biphasic in nature.  Good capillary refill. No unusual venous distention. Negative for fibrosis or scarring  Positive for spider veins, telangiectasias and hemosiderin deposition or hyperpigmentation      Circumferential volume measures:    No flowsheet data found.    Ulceration(s)/Wound(s):     Wound 01/26/18 L lateral calf (Active)   Pre Size Length 9.3 1/26/2018  8:00 AM   Pre Size Width 2.5 1/26/2018  8:00 AM   Pre Size Depth 0.1 1/26/2018  8:00 AM   Pre Total Sq cm 23.25 1/26/2018  8:00 AM   Undermined n 1/26/2018  8:00 AM   Tunneling n 1/26/2018  8:00 AM   Prodcut Used Gauze 1/26/2018  8:00 AM       Laboratory studies:   No results found for: SEDRATE  Lab Results   Component Value Date    CREATININE 0.72 08/15/2017     No results found for: HGBA1C  Lab Results   Component Value Date    BUN 11 08/15/2017     Lab Results   Component Value Date    ALBUMIN 3.6 08/15/2017     Vitamin D, Total (25-Hydroxy)   Date Value Ref Range Status   08/15/2017 22.9 (L) 30.0 - 80.0 ng/mL Final             Impression:   Traumatic skin tear to the left leg  Advanced age  Low vitamin d    Assessment/Plan:  1. Excisional debridement of all the ulcer(s) was recommended today.  After consent was obtained and 2% Lidocaine HCL jelly was applied all of the ulcers were excisionally debrided using a sterile curet under clean condition the epidermal, dermal and down into the subcutaneous tissue  were sharply debrided for a total square cm of 23.25. Devitalized and non viable tissue was removed to improve granulation tissue formation, stimulate wound healing, decrease overall bacteria load, disrupt biofilm formation and decrease edge senescence. Patient tolerated this well and the ulcers appeared much  afterwards.    2. Edema. None; will apply single tubigrip to keep the swelling out of the leg and keep dressing in place    3. Treatment will have her daugther in law provide the wound care; every 3 days; cleanse the area with saline; pat dry; medihoney alginate; oil emulsion; ABD; rolled gauze; keep dry in the shower; continue keflex until all gone    4. Offloading: na    5. Nutrition: her vitamin d was noted to be low; recommend 2000 units daily; weight stable; last albumin was mag; continue to monitor    Patient to return to clinic in 3 week(s) for re-evaluation. They were instructed to call the clinic sooner with any further questions or concerns. Answered all questions.    Erum Mullins DNP, RN, CNP, [facility]  [phone]     No

## 2021-06-24 ENCOUNTER — TRANSCRIPTION ENCOUNTER (OUTPATIENT)
Age: 21
End: 2021-06-24

## 2021-06-24 ENCOUNTER — OUTPATIENT (OUTPATIENT)
Dept: OUTPATIENT SERVICES | Facility: HOSPITAL | Age: 21
LOS: 1 days | Discharge: HOME | End: 2021-06-24

## 2021-06-24 ENCOUNTER — APPOINTMENT (OUTPATIENT)
Dept: MEDICATION MANAGEMENT | Facility: CLINIC | Age: 21
End: 2021-06-24

## 2021-06-24 DIAGNOSIS — Z79.01 LONG TERM (CURRENT) USE OF ANTICOAGULANTS: ICD-10-CM

## 2021-06-24 DIAGNOSIS — Q21.2 ATRIOVENTRICULAR SEPTAL DEFECT: Chronic | ICD-10-CM

## 2021-06-24 DIAGNOSIS — Z95.3 PRESENCE OF XENOGENIC HEART VALVE: ICD-10-CM

## 2021-06-24 DIAGNOSIS — Z95.2 PRESENCE OF PROSTHETIC HEART VALVE: Chronic | ICD-10-CM

## 2021-06-24 LAB
INR PPP: 3 RATIO
POCT-PROTHROMBIN TIME: 36 SECS
QUALITY CONTROL: YES

## 2021-07-08 ENCOUNTER — APPOINTMENT (OUTPATIENT)
Dept: MEDICATION MANAGEMENT | Facility: CLINIC | Age: 21
End: 2021-07-08

## 2021-07-08 ENCOUNTER — OUTPATIENT (OUTPATIENT)
Dept: OUTPATIENT SERVICES | Facility: HOSPITAL | Age: 21
LOS: 1 days | Discharge: HOME | End: 2021-07-08

## 2021-07-08 VITALS — OXYGEN SATURATION: 94 % | HEART RATE: 82 BPM | RESPIRATION RATE: 18 BRPM

## 2021-07-08 DIAGNOSIS — Z79.01 LONG TERM (CURRENT) USE OF ANTICOAGULANTS: ICD-10-CM

## 2021-07-08 DIAGNOSIS — Z95.2 PRESENCE OF PROSTHETIC HEART VALVE: Chronic | ICD-10-CM

## 2021-07-08 DIAGNOSIS — Z95.3 PRESENCE OF XENOGENIC HEART VALVE: ICD-10-CM

## 2021-07-08 DIAGNOSIS — Z79.01 ENCOUNTER FOR THERAPEUTIC DRUG LVL MONITORING: ICD-10-CM

## 2021-07-08 DIAGNOSIS — Q21.2 ATRIOVENTRICULAR SEPTAL DEFECT: Chronic | ICD-10-CM

## 2021-07-08 DIAGNOSIS — Z51.81 ENCOUNTER FOR THERAPEUTIC DRUG LVL MONITORING: ICD-10-CM

## 2021-07-08 DIAGNOSIS — Z95.2 PRESENCE OF PROSTHETIC HEART VALVE: ICD-10-CM

## 2021-07-08 LAB
INR PPP: 3.2 RATIO
POCT-PROTHROMBIN TIME: 38.2 SECS
QUALITY CONTROL: YES

## 2021-07-22 ENCOUNTER — APPOINTMENT (OUTPATIENT)
Dept: MEDICATION MANAGEMENT | Facility: CLINIC | Age: 21
End: 2021-07-22

## 2021-07-22 ENCOUNTER — OUTPATIENT (OUTPATIENT)
Dept: OUTPATIENT SERVICES | Facility: HOSPITAL | Age: 21
LOS: 1 days | Discharge: HOME | End: 2021-07-22

## 2021-07-22 DIAGNOSIS — Z79.01 LONG TERM (CURRENT) USE OF ANTICOAGULANTS: ICD-10-CM

## 2021-07-22 DIAGNOSIS — Q21.2 ATRIOVENTRICULAR SEPTAL DEFECT: Chronic | ICD-10-CM

## 2021-07-22 DIAGNOSIS — Z95.2 PRESENCE OF PROSTHETIC HEART VALVE: Chronic | ICD-10-CM

## 2021-07-22 DIAGNOSIS — Z95.3 PRESENCE OF XENOGENIC HEART VALVE: ICD-10-CM

## 2021-07-22 LAB
INR PPP: 3.6 RATIO
POCT-PROTHROMBIN TIME: 43.7 SECS
QUALITY CONTROL: YES

## 2021-08-05 ENCOUNTER — OUTPATIENT (OUTPATIENT)
Dept: OUTPATIENT SERVICES | Facility: HOSPITAL | Age: 21
LOS: 1 days | Discharge: HOME | End: 2021-08-05

## 2021-08-05 ENCOUNTER — APPOINTMENT (OUTPATIENT)
Dept: MEDICATION MANAGEMENT | Facility: CLINIC | Age: 21
End: 2021-08-05

## 2021-08-05 VITALS — HEART RATE: 84 BPM | OXYGEN SATURATION: 97 % | RESPIRATION RATE: 16 BRPM

## 2021-08-05 DIAGNOSIS — Z79.01 LONG TERM (CURRENT) USE OF ANTICOAGULANTS: ICD-10-CM

## 2021-08-05 DIAGNOSIS — Z95.3 PRESENCE OF XENOGENIC HEART VALVE: ICD-10-CM

## 2021-08-05 DIAGNOSIS — Q21.2 ATRIOVENTRICULAR SEPTAL DEFECT: Chronic | ICD-10-CM

## 2021-08-05 DIAGNOSIS — Z95.2 PRESENCE OF PROSTHETIC HEART VALVE: Chronic | ICD-10-CM

## 2021-08-05 LAB
INR PPP: 3 RATIO
POCT-PROTHROMBIN TIME: 36.3 SECS
QUALITY CONTROL: YES

## 2021-08-26 ENCOUNTER — APPOINTMENT (OUTPATIENT)
Dept: MEDICATION MANAGEMENT | Facility: CLINIC | Age: 21
End: 2021-08-26

## 2021-08-26 ENCOUNTER — OUTPATIENT (OUTPATIENT)
Dept: OUTPATIENT SERVICES | Facility: HOSPITAL | Age: 21
LOS: 1 days | Discharge: HOME | End: 2021-08-26

## 2021-08-26 DIAGNOSIS — Z79.01 LONG TERM (CURRENT) USE OF ANTICOAGULANTS: ICD-10-CM

## 2021-08-26 DIAGNOSIS — Z95.3 PRESENCE OF XENOGENIC HEART VALVE: ICD-10-CM

## 2021-08-26 DIAGNOSIS — Q21.2 ATRIOVENTRICULAR SEPTAL DEFECT: Chronic | ICD-10-CM

## 2021-08-26 DIAGNOSIS — Z95.2 PRESENCE OF PROSTHETIC HEART VALVE: Chronic | ICD-10-CM

## 2021-08-26 LAB
INR PPP: 3.3 RATIO
POCT-PROTHROMBIN TIME: 40.2 SECS
QUALITY CONTROL: YES

## 2021-09-21 ENCOUNTER — OUTPATIENT (OUTPATIENT)
Dept: OUTPATIENT SERVICES | Facility: HOSPITAL | Age: 21
LOS: 1 days | Discharge: HOME | End: 2021-09-21

## 2021-09-21 ENCOUNTER — APPOINTMENT (OUTPATIENT)
Dept: MEDICATION MANAGEMENT | Facility: CLINIC | Age: 21
End: 2021-09-21

## 2021-09-21 DIAGNOSIS — Z95.3 PRESENCE OF XENOGENIC HEART VALVE: ICD-10-CM

## 2021-09-21 DIAGNOSIS — Q21.2 ATRIOVENTRICULAR SEPTAL DEFECT: Chronic | ICD-10-CM

## 2021-09-21 DIAGNOSIS — Z79.01 LONG TERM (CURRENT) USE OF ANTICOAGULANTS: ICD-10-CM

## 2021-09-21 DIAGNOSIS — Z95.2 PRESENCE OF PROSTHETIC HEART VALVE: Chronic | ICD-10-CM

## 2021-09-21 LAB
INR PPP: 3.3 RATIO
POCT-PROTHROMBIN TIME: 39.9 SECS
QUALITY CONTROL: YES

## 2021-10-19 ENCOUNTER — APPOINTMENT (OUTPATIENT)
Dept: MEDICATION MANAGEMENT | Facility: CLINIC | Age: 21
End: 2021-10-19

## 2021-10-19 ENCOUNTER — OUTPATIENT (OUTPATIENT)
Dept: OUTPATIENT SERVICES | Facility: HOSPITAL | Age: 21
LOS: 1 days | Discharge: HOME | End: 2021-10-19

## 2021-10-19 DIAGNOSIS — Q21.2 ATRIOVENTRICULAR SEPTAL DEFECT: Chronic | ICD-10-CM

## 2021-10-19 DIAGNOSIS — Z79.01 LONG TERM (CURRENT) USE OF ANTICOAGULANTS: ICD-10-CM

## 2021-10-19 DIAGNOSIS — Z95.3 PRESENCE OF XENOGENIC HEART VALVE: ICD-10-CM

## 2021-10-19 DIAGNOSIS — Z95.2 PRESENCE OF PROSTHETIC HEART VALVE: Chronic | ICD-10-CM

## 2021-10-19 LAB
INR PPP: 3.8 RATIO
POCT-PROTHROMBIN TIME: 45.2 SECS
QUALITY CONTROL: YES

## 2021-11-09 ENCOUNTER — APPOINTMENT (OUTPATIENT)
Dept: MEDICATION MANAGEMENT | Facility: CLINIC | Age: 21
End: 2021-11-09

## 2021-11-09 ENCOUNTER — OUTPATIENT (OUTPATIENT)
Dept: OUTPATIENT SERVICES | Facility: HOSPITAL | Age: 21
LOS: 1 days | Discharge: HOME | End: 2021-11-09

## 2021-11-09 DIAGNOSIS — Z79.01 LONG TERM (CURRENT) USE OF ANTICOAGULANTS: ICD-10-CM

## 2021-11-09 DIAGNOSIS — Z95.2 PRESENCE OF PROSTHETIC HEART VALVE: Chronic | ICD-10-CM

## 2021-11-09 DIAGNOSIS — Z95.3 PRESENCE OF XENOGENIC HEART VALVE: ICD-10-CM

## 2021-11-09 DIAGNOSIS — Q21.2 ATRIOVENTRICULAR SEPTAL DEFECT: Chronic | ICD-10-CM

## 2021-11-09 LAB
INR PPP: 3.6 RATIO
POCT-PROTHROMBIN TIME: 43.2 SECS
QUALITY CONTROL: YES

## 2021-12-08 ENCOUNTER — APPOINTMENT (OUTPATIENT)
Dept: MEDICATION MANAGEMENT | Facility: CLINIC | Age: 21
End: 2021-12-08

## 2021-12-08 ENCOUNTER — OUTPATIENT (OUTPATIENT)
Dept: OUTPATIENT SERVICES | Facility: HOSPITAL | Age: 21
LOS: 1 days | Discharge: HOME | End: 2021-12-08

## 2021-12-08 DIAGNOSIS — Z95.2 PRESENCE OF PROSTHETIC HEART VALVE: Chronic | ICD-10-CM

## 2021-12-08 DIAGNOSIS — Z95.3 PRESENCE OF XENOGENIC HEART VALVE: ICD-10-CM

## 2021-12-08 DIAGNOSIS — Z79.01 LONG TERM (CURRENT) USE OF ANTICOAGULANTS: ICD-10-CM

## 2021-12-08 DIAGNOSIS — Q21.2 ATRIOVENTRICULAR SEPTAL DEFECT: Chronic | ICD-10-CM

## 2021-12-08 LAB
INR PPP: 2.8 RATIO
POCT-PROTHROMBIN TIME: 34.1 SECS
QUALITY CONTROL: YES

## 2022-01-10 ENCOUNTER — APPOINTMENT (OUTPATIENT)
Dept: MEDICATION MANAGEMENT | Facility: CLINIC | Age: 22
End: 2022-01-10

## 2022-01-10 ENCOUNTER — OUTPATIENT (OUTPATIENT)
Dept: OUTPATIENT SERVICES | Facility: HOSPITAL | Age: 22
LOS: 1 days | Discharge: HOME | End: 2022-01-10

## 2022-01-10 DIAGNOSIS — Z95.3 PRESENCE OF XENOGENIC HEART VALVE: ICD-10-CM

## 2022-01-10 DIAGNOSIS — Q21.2 ATRIOVENTRICULAR SEPTAL DEFECT: Chronic | ICD-10-CM

## 2022-01-10 DIAGNOSIS — Z79.01 LONG TERM (CURRENT) USE OF ANTICOAGULANTS: ICD-10-CM

## 2022-01-10 DIAGNOSIS — Z95.2 PRESENCE OF PROSTHETIC HEART VALVE: Chronic | ICD-10-CM

## 2022-01-10 LAB
INR PPP: 3.8 RATIO
POCT-PROTHROMBIN TIME: 45.7 SECS
QUALITY CONTROL: YES

## 2022-02-01 ENCOUNTER — APPOINTMENT (OUTPATIENT)
Dept: MEDICATION MANAGEMENT | Facility: CLINIC | Age: 22
End: 2022-02-01

## 2022-02-01 ENCOUNTER — OUTPATIENT (OUTPATIENT)
Dept: OUTPATIENT SERVICES | Facility: HOSPITAL | Age: 22
LOS: 1 days | Discharge: HOME | End: 2022-02-01

## 2022-02-01 DIAGNOSIS — Q21.2 ATRIOVENTRICULAR SEPTAL DEFECT: Chronic | ICD-10-CM

## 2022-02-01 DIAGNOSIS — Z95.3 PRESENCE OF XENOGENIC HEART VALVE: ICD-10-CM

## 2022-02-01 DIAGNOSIS — Z79.01 LONG TERM (CURRENT) USE OF ANTICOAGULANTS: ICD-10-CM

## 2022-02-01 DIAGNOSIS — Z95.2 PRESENCE OF PROSTHETIC HEART VALVE: Chronic | ICD-10-CM

## 2022-02-01 LAB
INR PPP: 3.5 RATIO
POCT-PROTHROMBIN TIME: 41.7 SECS
QUALITY CONTROL: YES

## 2022-03-01 ENCOUNTER — OUTPATIENT (OUTPATIENT)
Dept: OUTPATIENT SERVICES | Facility: HOSPITAL | Age: 22
LOS: 1 days | Discharge: HOME | End: 2022-03-01

## 2022-03-01 ENCOUNTER — APPOINTMENT (OUTPATIENT)
Dept: MEDICATION MANAGEMENT | Facility: CLINIC | Age: 22
End: 2022-03-01

## 2022-03-01 DIAGNOSIS — Z95.2 PRESENCE OF PROSTHETIC HEART VALVE: Chronic | ICD-10-CM

## 2022-03-01 DIAGNOSIS — Z79.01 LONG TERM (CURRENT) USE OF ANTICOAGULANTS: ICD-10-CM

## 2022-03-01 DIAGNOSIS — Q21.2 ATRIOVENTRICULAR SEPTAL DEFECT: Chronic | ICD-10-CM

## 2022-03-01 DIAGNOSIS — Z95.3 PRESENCE OF XENOGENIC HEART VALVE: ICD-10-CM

## 2022-03-01 LAB
INR PPP: 3.1 RATIO
POCT-PROTHROMBIN TIME: 37.8 SECS
QUALITY CONTROL: YES

## 2022-03-03 NOTE — ED ADULT NURSE NOTE - COVID-19 ORDERING FACILITY
Dear Ms. Dhillon:    Tried to reach you today to review your PET/CT results that show that there are some nodules in your lungs and I spoke with Dr. Blanco and he has requested that we obtain a needle biopsy of the area.  Also request that we consultation with Dr. Kai Elizalde, the medical oncologist to specializes in sarcomas.      Miguel Moreno MD  3/3/2022  8:16 AM  
[facility]

## 2022-03-29 ENCOUNTER — OUTPATIENT (OUTPATIENT)
Dept: OUTPATIENT SERVICES | Facility: HOSPITAL | Age: 22
LOS: 1 days | Discharge: HOME | End: 2022-03-29

## 2022-03-29 ENCOUNTER — APPOINTMENT (OUTPATIENT)
Dept: MEDICATION MANAGEMENT | Facility: CLINIC | Age: 22
End: 2022-03-29

## 2022-03-29 DIAGNOSIS — Z95.3 PRESENCE OF XENOGENIC HEART VALVE: ICD-10-CM

## 2022-03-29 DIAGNOSIS — Z95.2 PRESENCE OF PROSTHETIC HEART VALVE: Chronic | ICD-10-CM

## 2022-03-29 DIAGNOSIS — Q21.2 ATRIOVENTRICULAR SEPTAL DEFECT: Chronic | ICD-10-CM

## 2022-03-29 DIAGNOSIS — Z79.01 LONG TERM (CURRENT) USE OF ANTICOAGULANTS: ICD-10-CM

## 2022-03-29 LAB
INR PPP: 3.8 RATIO
POCT-PROTHROMBIN TIME: 45.1 SECS
QUALITY CONTROL: YES

## 2022-04-26 ENCOUNTER — OUTPATIENT (OUTPATIENT)
Dept: OUTPATIENT SERVICES | Facility: HOSPITAL | Age: 22
LOS: 1 days | Discharge: HOME | End: 2022-04-26

## 2022-04-26 ENCOUNTER — APPOINTMENT (OUTPATIENT)
Dept: MEDICATION MANAGEMENT | Facility: CLINIC | Age: 22
End: 2022-04-26

## 2022-04-26 DIAGNOSIS — Q21.2 ATRIOVENTRICULAR SEPTAL DEFECT: Chronic | ICD-10-CM

## 2022-04-26 DIAGNOSIS — Z95.3 PRESENCE OF XENOGENIC HEART VALVE: ICD-10-CM

## 2022-04-26 DIAGNOSIS — Z95.2 PRESENCE OF PROSTHETIC HEART VALVE: Chronic | ICD-10-CM

## 2022-04-26 DIAGNOSIS — Z79.01 LONG TERM (CURRENT) USE OF ANTICOAGULANTS: ICD-10-CM

## 2022-04-26 LAB
INR PPP: 3.5 RATIO
POCT-PROTHROMBIN TIME: 42.1 SECS
QUALITY CONTROL: YES

## 2022-05-24 ENCOUNTER — APPOINTMENT (OUTPATIENT)
Dept: MEDICATION MANAGEMENT | Facility: CLINIC | Age: 22
End: 2022-05-24

## 2022-05-24 ENCOUNTER — OUTPATIENT (OUTPATIENT)
Dept: OUTPATIENT SERVICES | Facility: HOSPITAL | Age: 22
LOS: 1 days | Discharge: HOME | End: 2022-05-24

## 2022-05-24 DIAGNOSIS — Z95.2 PRESENCE OF PROSTHETIC HEART VALVE: Chronic | ICD-10-CM

## 2022-05-24 DIAGNOSIS — Q21.2 ATRIOVENTRICULAR SEPTAL DEFECT: Chronic | ICD-10-CM

## 2022-05-24 DIAGNOSIS — Z95.3 PRESENCE OF XENOGENIC HEART VALVE: ICD-10-CM

## 2022-05-24 DIAGNOSIS — Z79.01 LONG TERM (CURRENT) USE OF ANTICOAGULANTS: ICD-10-CM

## 2022-05-24 LAB
INR PPP: 3.1 RATIO
POCT-PROTHROMBIN TIME: 36.8 SECS
QUALITY CONTROL: YES

## 2022-06-21 ENCOUNTER — APPOINTMENT (OUTPATIENT)
Dept: MEDICATION MANAGEMENT | Facility: CLINIC | Age: 22
End: 2022-06-21

## 2022-06-21 ENCOUNTER — OUTPATIENT (OUTPATIENT)
Dept: OUTPATIENT SERVICES | Facility: HOSPITAL | Age: 22
LOS: 1 days | Discharge: HOME | End: 2022-06-21

## 2022-06-21 DIAGNOSIS — Q21.2 ATRIOVENTRICULAR SEPTAL DEFECT: Chronic | ICD-10-CM

## 2022-06-21 DIAGNOSIS — Z79.01 LONG TERM (CURRENT) USE OF ANTICOAGULANTS: ICD-10-CM

## 2022-06-21 DIAGNOSIS — Z95.3 PRESENCE OF XENOGENIC HEART VALVE: ICD-10-CM

## 2022-06-21 DIAGNOSIS — Z95.2 PRESENCE OF PROSTHETIC HEART VALVE: Chronic | ICD-10-CM

## 2022-06-21 LAB
INR PPP: 3 RATIO
POCT-PROTHROMBIN TIME: 36.2 SECS
QUALITY CONTROL: YES

## 2022-07-19 ENCOUNTER — APPOINTMENT (OUTPATIENT)
Dept: MEDICATION MANAGEMENT | Facility: CLINIC | Age: 22
End: 2022-07-19

## 2022-07-19 ENCOUNTER — OUTPATIENT (OUTPATIENT)
Dept: OUTPATIENT SERVICES | Facility: HOSPITAL | Age: 22
LOS: 1 days | Discharge: HOME | End: 2022-07-19

## 2022-07-19 DIAGNOSIS — Z95.2 PRESENCE OF PROSTHETIC HEART VALVE: Chronic | ICD-10-CM

## 2022-07-19 DIAGNOSIS — Z95.3 PRESENCE OF XENOGENIC HEART VALVE: ICD-10-CM

## 2022-07-19 DIAGNOSIS — Q21.2 ATRIOVENTRICULAR SEPTAL DEFECT: Chronic | ICD-10-CM

## 2022-07-19 DIAGNOSIS — Z79.01 LONG TERM (CURRENT) USE OF ANTICOAGULANTS: ICD-10-CM

## 2022-07-19 LAB
INR PPP: 2.8 RATIO
POCT-PROTHROMBIN TIME: 33.4 SECS
QUALITY CONTROL: YES

## 2022-08-16 ENCOUNTER — APPOINTMENT (OUTPATIENT)
Dept: MEDICATION MANAGEMENT | Facility: CLINIC | Age: 22
End: 2022-08-16

## 2022-08-16 ENCOUNTER — OUTPATIENT (OUTPATIENT)
Dept: OUTPATIENT SERVICES | Facility: HOSPITAL | Age: 22
LOS: 1 days | Discharge: HOME | End: 2022-08-16

## 2022-08-16 DIAGNOSIS — Q21.2 ATRIOVENTRICULAR SEPTAL DEFECT: Chronic | ICD-10-CM

## 2022-08-16 DIAGNOSIS — Z95.2 PRESENCE OF PROSTHETIC HEART VALVE: Chronic | ICD-10-CM

## 2022-08-16 DIAGNOSIS — Z95.3 PRESENCE OF XENOGENIC HEART VALVE: ICD-10-CM

## 2022-08-16 DIAGNOSIS — Z79.01 LONG TERM (CURRENT) USE OF ANTICOAGULANTS: ICD-10-CM

## 2022-08-16 LAB
INR PPP: 2.2 RATIO
POCT-PROTHROMBIN TIME: 26.3 SECS
QUALITY CONTROL: YES

## 2022-09-13 ENCOUNTER — APPOINTMENT (OUTPATIENT)
Dept: MEDICATION MANAGEMENT | Facility: CLINIC | Age: 22
End: 2022-09-13

## 2022-09-13 ENCOUNTER — OUTPATIENT (OUTPATIENT)
Dept: OUTPATIENT SERVICES | Facility: HOSPITAL | Age: 22
LOS: 1 days | Discharge: HOME | End: 2022-09-13

## 2022-09-13 DIAGNOSIS — Z95.3 PRESENCE OF XENOGENIC HEART VALVE: ICD-10-CM

## 2022-09-13 DIAGNOSIS — Z95.2 PRESENCE OF PROSTHETIC HEART VALVE: Chronic | ICD-10-CM

## 2022-09-13 DIAGNOSIS — Q21.2 ATRIOVENTRICULAR SEPTAL DEFECT: Chronic | ICD-10-CM

## 2022-09-13 DIAGNOSIS — Z79.01 LONG TERM (CURRENT) USE OF ANTICOAGULANTS: ICD-10-CM

## 2022-09-13 LAB
INR PPP: 3.3 RATIO
POCT-PROTHROMBIN TIME: 39.3 SECS
QUALITY CONTROL: YES

## 2022-10-11 ENCOUNTER — APPOINTMENT (OUTPATIENT)
Dept: MEDICATION MANAGEMENT | Facility: CLINIC | Age: 22
End: 2022-10-11

## 2022-10-11 ENCOUNTER — OUTPATIENT (OUTPATIENT)
Dept: OUTPATIENT SERVICES | Facility: HOSPITAL | Age: 22
LOS: 1 days | Discharge: HOME | End: 2022-10-11

## 2022-10-11 DIAGNOSIS — Z95.3 PRESENCE OF XENOGENIC HEART VALVE: ICD-10-CM

## 2022-10-11 DIAGNOSIS — Z95.2 PRESENCE OF PROSTHETIC HEART VALVE: Chronic | ICD-10-CM

## 2022-10-11 DIAGNOSIS — Z79.01 LONG TERM (CURRENT) USE OF ANTICOAGULANTS: ICD-10-CM

## 2022-10-11 DIAGNOSIS — Q21.2 ATRIOVENTRICULAR SEPTAL DEFECT: Chronic | ICD-10-CM

## 2022-10-11 LAB
INR PPP: 2.8 RATIO
POCT-PROTHROMBIN TIME: 33.9 SECS
QUALITY CONTROL: YES

## 2022-11-14 ENCOUNTER — OUTPATIENT (OUTPATIENT)
Dept: OUTPATIENT SERVICES | Facility: HOSPITAL | Age: 22
LOS: 1 days | Discharge: HOME | End: 2022-11-14

## 2022-11-14 ENCOUNTER — APPOINTMENT (OUTPATIENT)
Dept: MEDICATION MANAGEMENT | Facility: CLINIC | Age: 22
End: 2022-11-14

## 2022-11-14 DIAGNOSIS — Z95.3 PRESENCE OF XENOGENIC HEART VALVE: ICD-10-CM

## 2022-11-14 DIAGNOSIS — Z79.01 LONG TERM (CURRENT) USE OF ANTICOAGULANTS: ICD-10-CM

## 2022-11-14 DIAGNOSIS — Z95.2 PRESENCE OF PROSTHETIC HEART VALVE: Chronic | ICD-10-CM

## 2022-11-14 DIAGNOSIS — Q21.2 ATRIOVENTRICULAR SEPTAL DEFECT: Chronic | ICD-10-CM

## 2022-11-14 LAB
INR PPP: 2.4 RATIO
POCT-PROTHROMBIN TIME: 28.2 SECS
QUALITY CONTROL: YES

## 2022-12-22 ENCOUNTER — APPOINTMENT (OUTPATIENT)
Dept: MEDICATION MANAGEMENT | Facility: CLINIC | Age: 22
End: 2022-12-22

## 2022-12-22 ENCOUNTER — OUTPATIENT (OUTPATIENT)
Dept: OUTPATIENT SERVICES | Facility: HOSPITAL | Age: 22
LOS: 1 days | Discharge: HOME | End: 2022-12-22

## 2022-12-22 DIAGNOSIS — Z95.3 PRESENCE OF XENOGENIC HEART VALVE: ICD-10-CM

## 2022-12-22 DIAGNOSIS — Z79.01 LONG TERM (CURRENT) USE OF ANTICOAGULANTS: ICD-10-CM

## 2022-12-22 DIAGNOSIS — Q21.2 ATRIOVENTRICULAR SEPTAL DEFECT: Chronic | ICD-10-CM

## 2022-12-22 DIAGNOSIS — Z95.2 PRESENCE OF PROSTHETIC HEART VALVE: Chronic | ICD-10-CM

## 2022-12-22 LAB
INR PPP: 2.5 RATIO
POCT-PROTHROMBIN TIME: 30.3 SECS
QUALITY CONTROL: YES

## 2022-12-29 ENCOUNTER — OUTPATIENT (OUTPATIENT)
Dept: OUTPATIENT SERVICES | Facility: HOSPITAL | Age: 22
LOS: 1 days | Discharge: HOME | End: 2022-12-29
Payer: MEDICARE

## 2022-12-29 DIAGNOSIS — Q21.2 ATRIOVENTRICULAR SEPTAL DEFECT: Chronic | ICD-10-CM

## 2022-12-29 DIAGNOSIS — R91.1 SOLITARY PULMONARY NODULE: ICD-10-CM

## 2022-12-29 DIAGNOSIS — Z95.2 PRESENCE OF PROSTHETIC HEART VALVE: Chronic | ICD-10-CM

## 2022-12-29 PROCEDURE — 71250 CT THORAX DX C-: CPT | Mod: 26,MH

## 2023-01-17 ENCOUNTER — APPOINTMENT (OUTPATIENT)
Dept: MEDICATION MANAGEMENT | Facility: CLINIC | Age: 23
End: 2023-01-17

## 2023-01-17 ENCOUNTER — OUTPATIENT (OUTPATIENT)
Dept: OUTPATIENT SERVICES | Facility: HOSPITAL | Age: 23
LOS: 1 days | Discharge: HOME | End: 2023-01-17

## 2023-01-17 DIAGNOSIS — Z95.2 PRESENCE OF PROSTHETIC HEART VALVE: Chronic | ICD-10-CM

## 2023-01-17 DIAGNOSIS — Q21.2 ATRIOVENTRICULAR SEPTAL DEFECT: Chronic | ICD-10-CM

## 2023-01-17 DIAGNOSIS — Z79.01 LONG TERM (CURRENT) USE OF ANTICOAGULANTS: ICD-10-CM

## 2023-01-17 DIAGNOSIS — Z95.3 PRESENCE OF XENOGENIC HEART VALVE: ICD-10-CM

## 2023-01-17 LAB
INR PPP: 3.6 RATIO
POCT-PROTHROMBIN TIME: 42.8 SECS
QUALITY CONTROL: YES

## 2023-02-13 ENCOUNTER — APPOINTMENT (OUTPATIENT)
Dept: MEDICATION MANAGEMENT | Facility: CLINIC | Age: 23
End: 2023-02-13

## 2023-02-13 ENCOUNTER — OUTPATIENT (OUTPATIENT)
Dept: OUTPATIENT SERVICES | Facility: HOSPITAL | Age: 23
LOS: 1 days | End: 2023-02-13
Payer: MEDICARE

## 2023-02-13 DIAGNOSIS — Z95.3 PRESENCE OF XENOGENIC HEART VALVE: ICD-10-CM

## 2023-02-13 DIAGNOSIS — Q21.2 ATRIOVENTRICULAR SEPTAL DEFECT: Chronic | ICD-10-CM

## 2023-02-13 DIAGNOSIS — Z79.01 LONG TERM (CURRENT) USE OF ANTICOAGULANTS: ICD-10-CM

## 2023-02-13 DIAGNOSIS — Z95.2 PRESENCE OF PROSTHETIC HEART VALVE: Chronic | ICD-10-CM

## 2023-02-13 LAB
INR PPP: 4.1 RATIO
POCT-PROTHROMBIN TIME: 49.1 SECS
QUALITY CONTROL: YES

## 2023-02-13 PROCEDURE — 85610 PROTHROMBIN TIME: CPT

## 2023-02-13 PROCEDURE — 99211 OFF/OP EST MAY X REQ PHY/QHP: CPT

## 2023-02-13 PROCEDURE — 36416 COLLJ CAPILLARY BLOOD SPEC: CPT

## 2023-02-14 ENCOUNTER — APPOINTMENT (OUTPATIENT)
Dept: MEDICATION MANAGEMENT | Facility: CLINIC | Age: 23
End: 2023-02-14

## 2023-02-14 DIAGNOSIS — Z95.3 PRESENCE OF XENOGENIC HEART VALVE: ICD-10-CM

## 2023-02-14 DIAGNOSIS — Z79.01 LONG TERM (CURRENT) USE OF ANTICOAGULANTS: ICD-10-CM

## 2023-03-13 ENCOUNTER — OUTPATIENT (OUTPATIENT)
Dept: OUTPATIENT SERVICES | Facility: HOSPITAL | Age: 23
LOS: 1 days | End: 2023-03-13
Payer: MEDICARE

## 2023-03-13 ENCOUNTER — APPOINTMENT (OUTPATIENT)
Dept: MEDICATION MANAGEMENT | Facility: CLINIC | Age: 23
End: 2023-03-13

## 2023-03-13 DIAGNOSIS — Z95.3 PRESENCE OF XENOGENIC HEART VALVE: ICD-10-CM

## 2023-03-13 DIAGNOSIS — Z95.2 PRESENCE OF PROSTHETIC HEART VALVE: Chronic | ICD-10-CM

## 2023-03-13 DIAGNOSIS — Z79.01 LONG TERM (CURRENT) USE OF ANTICOAGULANTS: ICD-10-CM

## 2023-03-13 DIAGNOSIS — Q21.2 ATRIOVENTRICULAR SEPTAL DEFECT: Chronic | ICD-10-CM

## 2023-03-13 LAB
INR PPP: 3.9 RATIO
POCT-PROTHROMBIN TIME: 47.1 SECS
QUALITY CONTROL: YES

## 2023-03-13 PROCEDURE — 85610 PROTHROMBIN TIME: CPT

## 2023-03-13 PROCEDURE — 99211 OFF/OP EST MAY X REQ PHY/QHP: CPT

## 2023-03-13 PROCEDURE — 36416 COLLJ CAPILLARY BLOOD SPEC: CPT

## 2023-03-14 DIAGNOSIS — Z79.01 LONG TERM (CURRENT) USE OF ANTICOAGULANTS: ICD-10-CM

## 2023-03-14 DIAGNOSIS — Z95.3 PRESENCE OF XENOGENIC HEART VALVE: ICD-10-CM

## 2023-03-23 NOTE — ED PROVIDER NOTE - MDM PATIENT STATEMENT FOR ADDL TREATMENT
Spoke with patient and he is aware that insurance denied CT Chest. Ct has been canceled   Patient with one or more new problems requiring additional work-up/treatment.

## 2023-03-29 ENCOUNTER — OUTPATIENT (OUTPATIENT)
Dept: OUTPATIENT SERVICES | Facility: HOSPITAL | Age: 23
LOS: 1 days | End: 2023-03-29
Payer: MEDICARE

## 2023-03-29 DIAGNOSIS — Z00.8 ENCOUNTER FOR OTHER GENERAL EXAMINATION: ICD-10-CM

## 2023-03-29 DIAGNOSIS — R91.1 SOLITARY PULMONARY NODULE: ICD-10-CM

## 2023-03-29 DIAGNOSIS — Z95.2 PRESENCE OF PROSTHETIC HEART VALVE: Chronic | ICD-10-CM

## 2023-03-29 DIAGNOSIS — Q21.2 ATRIOVENTRICULAR SEPTAL DEFECT: Chronic | ICD-10-CM

## 2023-03-29 PROCEDURE — 71250 CT THORAX DX C-: CPT

## 2023-03-29 PROCEDURE — 71250 CT THORAX DX C-: CPT | Mod: 26

## 2023-03-30 DIAGNOSIS — R91.1 SOLITARY PULMONARY NODULE: ICD-10-CM

## 2023-04-05 ENCOUNTER — OUTPATIENT (OUTPATIENT)
Dept: OUTPATIENT SERVICES | Facility: HOSPITAL | Age: 23
LOS: 1 days | End: 2023-04-05
Payer: MEDICARE

## 2023-04-05 ENCOUNTER — APPOINTMENT (OUTPATIENT)
Dept: MEDICATION MANAGEMENT | Facility: CLINIC | Age: 23
End: 2023-04-05

## 2023-04-05 DIAGNOSIS — Z79.01 LONG TERM (CURRENT) USE OF ANTICOAGULANTS: ICD-10-CM

## 2023-04-05 DIAGNOSIS — Z95.2 PRESENCE OF PROSTHETIC HEART VALVE: Chronic | ICD-10-CM

## 2023-04-05 DIAGNOSIS — Q21.2 ATRIOVENTRICULAR SEPTAL DEFECT: Chronic | ICD-10-CM

## 2023-04-05 DIAGNOSIS — Z95.3 PRESENCE OF XENOGENIC HEART VALVE: ICD-10-CM

## 2023-04-05 LAB
INR PPP: 3 RATIO
POCT-PROTHROMBIN TIME: 36.5 SECS
QUALITY CONTROL: YES

## 2023-04-05 PROCEDURE — 99211 OFF/OP EST MAY X REQ PHY/QHP: CPT

## 2023-04-05 PROCEDURE — 85610 PROTHROMBIN TIME: CPT

## 2023-04-05 PROCEDURE — 36416 COLLJ CAPILLARY BLOOD SPEC: CPT

## 2023-04-06 DIAGNOSIS — Z95.3 PRESENCE OF XENOGENIC HEART VALVE: ICD-10-CM

## 2023-04-06 DIAGNOSIS — Z79.01 LONG TERM (CURRENT) USE OF ANTICOAGULANTS: ICD-10-CM

## 2023-05-02 ENCOUNTER — APPOINTMENT (OUTPATIENT)
Dept: MEDICATION MANAGEMENT | Facility: CLINIC | Age: 23
End: 2023-05-02

## 2023-05-02 ENCOUNTER — OUTPATIENT (OUTPATIENT)
Dept: OUTPATIENT SERVICES | Facility: HOSPITAL | Age: 23
LOS: 1 days | End: 2023-05-02
Payer: MEDICARE

## 2023-05-02 DIAGNOSIS — Z95.3 PRESENCE OF XENOGENIC HEART VALVE: ICD-10-CM

## 2023-05-02 DIAGNOSIS — Z95.2 PRESENCE OF PROSTHETIC HEART VALVE: Chronic | ICD-10-CM

## 2023-05-02 DIAGNOSIS — Z79.01 LONG TERM (CURRENT) USE OF ANTICOAGULANTS: ICD-10-CM

## 2023-05-02 LAB
INR PPP: 2.9 RATIO
POCT-PROTHROMBIN TIME: 35.2 SECS
QUALITY CONTROL: YES

## 2023-05-02 PROCEDURE — 36416 COLLJ CAPILLARY BLOOD SPEC: CPT

## 2023-05-02 PROCEDURE — 85610 PROTHROMBIN TIME: CPT

## 2023-05-02 PROCEDURE — 99211 OFF/OP EST MAY X REQ PHY/QHP: CPT

## 2023-05-03 DIAGNOSIS — Z79.01 LONG TERM (CURRENT) USE OF ANTICOAGULANTS: ICD-10-CM

## 2023-05-03 DIAGNOSIS — Z95.3 PRESENCE OF XENOGENIC HEART VALVE: ICD-10-CM

## 2023-06-02 ENCOUNTER — APPOINTMENT (OUTPATIENT)
Dept: MEDICATION MANAGEMENT | Facility: CLINIC | Age: 23
End: 2023-06-02

## 2023-06-06 ENCOUNTER — OUTPATIENT (OUTPATIENT)
Dept: OUTPATIENT SERVICES | Facility: HOSPITAL | Age: 23
LOS: 1 days | End: 2023-06-06
Payer: MEDICARE

## 2023-06-06 ENCOUNTER — APPOINTMENT (OUTPATIENT)
Dept: MEDICATION MANAGEMENT | Facility: CLINIC | Age: 23
End: 2023-06-06

## 2023-06-06 DIAGNOSIS — Z51.81 ENCOUNTER FOR THERAPEUTIC DRUG LEVEL MONITORING: ICD-10-CM

## 2023-06-06 DIAGNOSIS — Z95.2 PRESENCE OF PROSTHETIC HEART VALVE: Chronic | ICD-10-CM

## 2023-06-06 DIAGNOSIS — Q21.2 ATRIOVENTRICULAR SEPTAL DEFECT: Chronic | ICD-10-CM

## 2023-06-06 DIAGNOSIS — Z79.01 LONG TERM (CURRENT) USE OF ANTICOAGULANTS: ICD-10-CM

## 2023-06-06 LAB
INR PPP: 3.1 RATIO
POCT-PROTHROMBIN TIME: 36.7 SECS
QUALITY CONTROL: YES

## 2023-06-06 PROCEDURE — 99211 OFF/OP EST MAY X REQ PHY/QHP: CPT

## 2023-06-06 PROCEDURE — 85610 PROTHROMBIN TIME: CPT

## 2023-06-06 PROCEDURE — 36416 COLLJ CAPILLARY BLOOD SPEC: CPT

## 2023-06-07 DIAGNOSIS — Z51.81 ENCOUNTER FOR THERAPEUTIC DRUG LEVEL MONITORING: ICD-10-CM

## 2023-06-07 DIAGNOSIS — Z79.01 LONG TERM (CURRENT) USE OF ANTICOAGULANTS: ICD-10-CM

## 2023-07-05 ENCOUNTER — APPOINTMENT (OUTPATIENT)
Dept: MEDICATION MANAGEMENT | Facility: CLINIC | Age: 23
End: 2023-07-05

## 2023-07-05 ENCOUNTER — OUTPATIENT (OUTPATIENT)
Dept: OUTPATIENT SERVICES | Facility: HOSPITAL | Age: 23
LOS: 1 days | End: 2023-07-05
Payer: MEDICARE

## 2023-07-05 DIAGNOSIS — Z51.81 ENCOUNTER FOR THERAPEUTIC DRUG LEVEL MONITORING: ICD-10-CM

## 2023-07-05 DIAGNOSIS — Q21.2 ATRIOVENTRICULAR SEPTAL DEFECT: Chronic | ICD-10-CM

## 2023-07-05 DIAGNOSIS — Z79.01 LONG TERM (CURRENT) USE OF ANTICOAGULANTS: ICD-10-CM

## 2023-07-05 DIAGNOSIS — Z95.2 PRESENCE OF PROSTHETIC HEART VALVE: Chronic | ICD-10-CM

## 2023-07-05 LAB
INR PPP: 4.4 RATIO
POCT-PROTHROMBIN TIME: 52.7 SECS
QUALITY CONTROL: YES

## 2023-07-05 PROCEDURE — 36416 COLLJ CAPILLARY BLOOD SPEC: CPT

## 2023-07-05 PROCEDURE — 99211 OFF/OP EST MAY X REQ PHY/QHP: CPT

## 2023-07-05 PROCEDURE — 85610 PROTHROMBIN TIME: CPT

## 2023-07-06 DIAGNOSIS — Z51.81 ENCOUNTER FOR THERAPEUTIC DRUG LEVEL MONITORING: ICD-10-CM

## 2023-07-06 DIAGNOSIS — Z79.01 LONG TERM (CURRENT) USE OF ANTICOAGULANTS: ICD-10-CM

## 2023-07-18 ENCOUNTER — OUTPATIENT (OUTPATIENT)
Dept: OUTPATIENT SERVICES | Facility: HOSPITAL | Age: 23
LOS: 1 days | End: 2023-07-18
Payer: MEDICARE

## 2023-07-18 ENCOUNTER — APPOINTMENT (OUTPATIENT)
Dept: MEDICATION MANAGEMENT | Facility: CLINIC | Age: 23
End: 2023-07-18

## 2023-07-18 DIAGNOSIS — Z95.2 PRESENCE OF PROSTHETIC HEART VALVE: Chronic | ICD-10-CM

## 2023-07-18 DIAGNOSIS — Q21.2 ATRIOVENTRICULAR SEPTAL DEFECT: Chronic | ICD-10-CM

## 2023-07-18 DIAGNOSIS — Z79.01 LONG TERM (CURRENT) USE OF ANTICOAGULANTS: ICD-10-CM

## 2023-07-18 DIAGNOSIS — Z51.81 ENCOUNTER FOR THERAPEUTIC DRUG LEVEL MONITORING: ICD-10-CM

## 2023-07-18 LAB
INR PPP: 3.1 RATIO
POCT-PROTHROMBIN TIME: 36.7 SECS
QUALITY CONTROL: YES

## 2023-07-18 PROCEDURE — 36416 COLLJ CAPILLARY BLOOD SPEC: CPT

## 2023-07-18 PROCEDURE — 99211 OFF/OP EST MAY X REQ PHY/QHP: CPT

## 2023-07-18 PROCEDURE — 85610 PROTHROMBIN TIME: CPT

## 2023-07-19 DIAGNOSIS — Z79.01 LONG TERM (CURRENT) USE OF ANTICOAGULANTS: ICD-10-CM

## 2023-07-19 DIAGNOSIS — Z51.81 ENCOUNTER FOR THERAPEUTIC DRUG LEVEL MONITORING: ICD-10-CM

## 2023-08-15 ENCOUNTER — APPOINTMENT (OUTPATIENT)
Dept: MEDICATION MANAGEMENT | Facility: CLINIC | Age: 23
End: 2023-08-15

## 2023-08-15 ENCOUNTER — OUTPATIENT (OUTPATIENT)
Dept: OUTPATIENT SERVICES | Facility: HOSPITAL | Age: 23
LOS: 1 days | End: 2023-08-15
Payer: MEDICARE

## 2023-08-15 DIAGNOSIS — Z79.01 LONG TERM (CURRENT) USE OF ANTICOAGULANTS: ICD-10-CM

## 2023-08-15 DIAGNOSIS — Z95.2 PRESENCE OF PROSTHETIC HEART VALVE: Chronic | ICD-10-CM

## 2023-08-15 DIAGNOSIS — Z51.81 ENCOUNTER FOR THERAPEUTIC DRUG LEVEL MONITORING: ICD-10-CM

## 2023-08-15 LAB
INR PPP: 2.3 RATIO
POCT-PROTHROMBIN TIME: 28.1 SECS
QUALITY CONTROL: YES

## 2023-08-15 PROCEDURE — 99211 OFF/OP EST MAY X REQ PHY/QHP: CPT

## 2023-08-15 PROCEDURE — 36416 COLLJ CAPILLARY BLOOD SPEC: CPT

## 2023-08-15 PROCEDURE — 85610 PROTHROMBIN TIME: CPT

## 2023-08-16 DIAGNOSIS — Z51.81 ENCOUNTER FOR THERAPEUTIC DRUG LEVEL MONITORING: ICD-10-CM

## 2023-08-16 DIAGNOSIS — Z79.01 LONG TERM (CURRENT) USE OF ANTICOAGULANTS: ICD-10-CM

## 2023-08-17 RX ORDER — WARFARIN 4 MG/1
4 TABLET ORAL DAILY
Qty: 30 | Refills: 0 | Status: ACTIVE | OUTPATIENT
Start: 2023-08-17

## 2023-08-18 RX ORDER — WARFARIN 4 MG/1
4 TABLET ORAL DAILY
Qty: 90 | Refills: 3 | Status: ACTIVE | COMMUNITY
Start: 2023-08-18 | End: 1900-01-01

## 2023-09-12 ENCOUNTER — APPOINTMENT (OUTPATIENT)
Dept: MEDICATION MANAGEMENT | Facility: CLINIC | Age: 23
End: 2023-09-12

## 2023-09-12 ENCOUNTER — OUTPATIENT (OUTPATIENT)
Dept: OUTPATIENT SERVICES | Facility: HOSPITAL | Age: 23
LOS: 1 days | End: 2023-09-12
Payer: MEDICARE

## 2023-09-12 DIAGNOSIS — Z79.01 LONG TERM (CURRENT) USE OF ANTICOAGULANTS: ICD-10-CM

## 2023-09-12 DIAGNOSIS — Z51.81 ENCOUNTER FOR THERAPEUTIC DRUG LEVEL MONITORING: ICD-10-CM

## 2023-09-12 DIAGNOSIS — Q21.2 ATRIOVENTRICULAR SEPTAL DEFECT: Chronic | ICD-10-CM

## 2023-09-12 DIAGNOSIS — Z95.2 PRESENCE OF PROSTHETIC HEART VALVE: Chronic | ICD-10-CM

## 2023-09-12 PROCEDURE — 99211 OFF/OP EST MAY X REQ PHY/QHP: CPT

## 2023-09-12 PROCEDURE — 85610 PROTHROMBIN TIME: CPT

## 2023-09-12 PROCEDURE — 36416 COLLJ CAPILLARY BLOOD SPEC: CPT

## 2023-09-13 DIAGNOSIS — Z51.81 ENCOUNTER FOR THERAPEUTIC DRUG LEVEL MONITORING: ICD-10-CM

## 2023-09-13 DIAGNOSIS — Z79.01 LONG TERM (CURRENT) USE OF ANTICOAGULANTS: ICD-10-CM

## 2023-09-13 LAB
INR PPP: 2.4 RATIO
POCT-PROTHROMBIN TIME: 29.2 SECS
QUALITY CONTROL: YES

## 2023-10-10 ENCOUNTER — APPOINTMENT (OUTPATIENT)
Dept: MEDICATION MANAGEMENT | Facility: CLINIC | Age: 23
End: 2023-10-10

## 2023-10-10 ENCOUNTER — OUTPATIENT (OUTPATIENT)
Dept: OUTPATIENT SERVICES | Facility: HOSPITAL | Age: 23
LOS: 1 days | End: 2023-10-10
Payer: MEDICARE

## 2023-10-10 DIAGNOSIS — Z79.01 LONG TERM (CURRENT) USE OF ANTICOAGULANTS: ICD-10-CM

## 2023-10-10 DIAGNOSIS — Z51.81 ENCOUNTER FOR THERAPEUTIC DRUG LEVEL MONITORING: ICD-10-CM

## 2023-10-10 DIAGNOSIS — Q21.2 ATRIOVENTRICULAR SEPTAL DEFECT: Chronic | ICD-10-CM

## 2023-10-10 LAB
INR PPP: 2.1 RATIO
POCT-PROTHROMBIN TIME: 29.1 SECS
QUALITY CONTROL: YES

## 2023-10-10 PROCEDURE — 85610 PROTHROMBIN TIME: CPT

## 2023-10-10 PROCEDURE — 99211 OFF/OP EST MAY X REQ PHY/QHP: CPT

## 2023-10-10 PROCEDURE — 36416 COLLJ CAPILLARY BLOOD SPEC: CPT

## 2023-10-11 DIAGNOSIS — Z51.81 ENCOUNTER FOR THERAPEUTIC DRUG LEVEL MONITORING: ICD-10-CM

## 2023-10-11 DIAGNOSIS — Z79.01 LONG TERM (CURRENT) USE OF ANTICOAGULANTS: ICD-10-CM

## 2023-11-07 ENCOUNTER — APPOINTMENT (OUTPATIENT)
Dept: MEDICATION MANAGEMENT | Facility: CLINIC | Age: 23
End: 2023-11-07

## 2023-11-07 ENCOUNTER — OUTPATIENT (OUTPATIENT)
Dept: OUTPATIENT SERVICES | Facility: HOSPITAL | Age: 23
LOS: 1 days | End: 2023-11-07
Payer: MEDICARE

## 2023-11-07 DIAGNOSIS — Z51.81 ENCOUNTER FOR THERAPEUTIC DRUG LEVEL MONITORING: ICD-10-CM

## 2023-11-07 DIAGNOSIS — Z95.2 PRESENCE OF PROSTHETIC HEART VALVE: Chronic | ICD-10-CM

## 2023-11-07 DIAGNOSIS — Z79.01 LONG TERM (CURRENT) USE OF ANTICOAGULANTS: ICD-10-CM

## 2023-11-07 DIAGNOSIS — Q21.2 ATRIOVENTRICULAR SEPTAL DEFECT: Chronic | ICD-10-CM

## 2023-11-07 LAB
INR PPP: 2.5 RATIO
POCT-PROTHROMBIN TIME: 29.5 SECS
QUALITY CONTROL: YES

## 2023-11-07 PROCEDURE — 85610 PROTHROMBIN TIME: CPT

## 2023-11-07 PROCEDURE — 99211 OFF/OP EST MAY X REQ PHY/QHP: CPT

## 2023-11-07 PROCEDURE — 36416 COLLJ CAPILLARY BLOOD SPEC: CPT

## 2023-11-08 DIAGNOSIS — Z51.81 ENCOUNTER FOR THERAPEUTIC DRUG LEVEL MONITORING: ICD-10-CM

## 2023-11-08 DIAGNOSIS — Z79.01 LONG TERM (CURRENT) USE OF ANTICOAGULANTS: ICD-10-CM

## 2023-11-09 NOTE — PATIENT PROFILE PEDIATRIC. - DISCHARGE, PERSON TO RECEIVE PATIENT, PEDS PROFILE
Surgeon (Optional): CATHY Gardiner MD Surgeon Performing The Repair (Optional): CATHY Gardiner MD Biopsy Photograph Reviewed: Yes Accession #: [id number] Size Of Lesion In Cm: 1.8 X Size Of Lesion In Cm (Optional): 1.2 Size Of Margin In Cm: 0.4 Anesthesia Volume In Cc: 3 Was An Eye Clamp Used?: No Eye Clamp Note Details: An eye clamp was used during the procedure. Excision Method: Elliptical Saucerization Depth: dermis and superficial adipose tissue Repair Type: Intermediate Intermediate / Complex Repair - Final Wound Length In Cm: 6.2 Suturegard Retention Suture: 2-0 Nylon Retention Suture Bite Size: 3 mm Length To Time In Minutes Device Was In Place: 10 Number Of Hemigard Strips Per Side: 1 Undermining Type: Entire Wound Debridement Text: The wound edges were debrided prior to proceeding with the closure to facilitate wound healing. Helical Rim Text: The closure involved the helical rim. Vermilion Border Text: The closure involved the vermilion border. Nostril Rim Text: The closure involved the nostril rim. Retention Suture Text: Retention sutures were placed to support the closure and prevent dehiscence. Primary Defect Length (In Cm): 0 Suture Removal: 14 days Lab: [id number] Lab Facility: [id number] Graft Donor Site Bandage (Optional-Leave Blank If You Don't Want In Note): Steri-strips and a pressure bandage were applied to the donor site. Epidermal Closure Graft Donor Site (Optional): simple interrupted Billing Type: Third-Party Bill Excision Depth: adipose tissue Scalpel Size: 15 blade Anesthesia Type: 1% lidocaine with 1:100,000 epinephrine Hemostasis: Electrocautery Estimated Blood Loss (Cc): minimal Detail Level: Detailed Repair Anesthesia Method: local infiltration Deep Sutures: 3-0 Polysorb Epidermal Sutures: 4-0 Prolene Wound Care: Petrolatum Dressing: pressure dressing with telfa Suturegard Intro: Intraoperative tissue expansion was performed, utilizing the SUTUREGARD device, in order to reduce wound tension. Suturegard Body: The suture ends were repeatedly re-tightened and re-clamped to achieve the desired tissue expansion. Hemigard Intro: Due to skin fragility and wound tension, it was decided to use HEMIGARD adhesive retention suture devices to permit a linear closure. The skin was cleaned and dried for a 6cm distance away from the wound. Excessive hair, if present, was removed to allow for adhesion. Hemigard Postcare Instructions: The HEMIGARD strips are to remain completely dry for at least 5-7 days. Positioning (Leave Blank If You Do Not Want): The patient was placed in a comfortable position exposing the surgical site. Pre-Excision Curettage Text (Leave Blank If You Do Not Want): Prior to drawing the surgical margin the visible lesion was removed with electrodesiccation and curettage to clearly define the lesion size. Complex Repair Preamble Text (Leave Blank If You Do Not Want): Extensive wide undermining was performed. Intermediate Repair Preamble Text (Leave Blank If You Do Not Want): Undermining was performed with blunt dissection. Curvilinear Excision Additional Text (Leave Blank If You Do Not Want): The margin was drawn around the clinically apparent lesion.  A curvilinear shape was then drawn on the skin incorporating the lesion and margins.  Incisions were then made along these lines to the appropriate tissue plane and the lesion was extirpated. Fusiform Excision Additional Text (Leave Blank If You Do Not Want): The margin was drawn around the clinically apparent lesion.  A fusiform shape was then drawn on the skin incorporating the lesion and margins.  Incisions were then made along these lines to the appropriate tissue plane and the lesion was extirpated. Elliptical Excision Additional Text (Leave Blank If You Do Not Want): The margin was drawn around the clinically apparent lesion.  An elliptical shape was then drawn on the skin incorporating the lesion and margins.  Incisions were then made along these lines to the appropriate tissue plane and the lesion was extirpated. Saucerization Excision Additional Text (Leave Blank If You Do Not Want): The margin was drawn around the clinically apparent lesion.  Incisions were then made along these lines, in a tangential fashion, to the appropriate tissue plane and the lesion was extirpated. Slit Excision Additional Text (Leave Blank If You Do Not Want): A linear line was drawn on the skin overlying the lesion. An incision was made slowly until the lesion was visualized.  Once visualized, the lesion was removed with blunt dissection. Excisional Biopsy Additional Text (Leave Blank If You Do Not Want): The margin was drawn around the clinically apparent lesion. An elliptical shape was then drawn on the skin incorporating the lesion and margins.  Incisions were then made along these lines to the appropriate tissue plane and the lesion was extirpated. Perilesional Excision Additional Text (Leave Blank If You Do Not Want): The margin was drawn around the clinically apparent lesion. Incisions were then made along these lines to the appropriate tissue plane and the lesion was extirpated. Repair Performed By Another Provider Text (Leave Blank If You Do Not Want): After the tissue was excised the defect was repaired by another provider. No Repair - Repaired With Adjacent Surgical Defect Text (Leave Blank If You Do Not Want): After the excision the defect was repaired concurrently with another surgical defect which was in close approximation. Adjacent Tissue Transfer Text: The defect edges were debeveled with a #15 scalpel blade. Given the location of the defect and the proximity to free margins an adjacent tissue transfer was deemed most appropriate. Using a sterile surgical marker, an appropriate flap was drawn incorporating the defect and placing the expected incisions within the relaxed skin tension lines where possible. The area thus outlined was incised deep to adipose tissue with a #15 scalpel blade. The skin margins were undermined to an appropriate distance in all directions utilizing iris scissors and carried over to close the primary defect. Advancement Flap (Single) Text: The defect edges were debeveled with a #15 scalpel blade.  Given the location of the defect and the proximity to free margins a single advancement flap was deemed most appropriate.  Using a sterile surgical marker, an appropriate advancement flap was drawn incorporating the defect and placing the expected incisions within the relaxed skin tension lines where possible.    The area thus outlined was incised deep to adipose tissue with a #15 scalpel blade.  The skin margins were undermined to an appropriate distance in all directions utilizing iris scissors. Advancement Flap (Double) Text: The defect edges were debeveled with a #15 scalpel blade.  Given the location of the defect and the proximity to free margins a double advancement flap was deemed most appropriate.  Using a sterile surgical marker, the appropriate advancement flaps were drawn incorporating the defect and placing the expected incisions within the relaxed skin tension lines where possible.    The area thus outlined was incised deep to adipose tissue with a #15 scalpel blade.  The skin margins were undermined to an appropriate distance in all directions utilizing iris scissors. Burow's Advancement Flap Text: The defect edges were debeveled with a #15 scalpel blade.  Given the location of the defect and the proximity to free margins a Burow's advancement flap was deemed most appropriate.  Using a sterile surgical marker, the appropriate advancement flap was drawn incorporating the defect and placing the expected incisions within the relaxed skin tension lines where possible.    The area thus outlined was incised deep to adipose tissue with a #15 scalpel blade.  The skin margins were undermined to an appropriate distance in all directions utilizing iris scissors. Chonodrocutaneous Helical Advancement Flap Text: The defect edges were debeveled with a #15 scalpel blade. Given the location of the defect and the proximity to free margins a chondrocutaneous helical advancement flap was deemed most appropriate. Using a sterile surgical marker, the appropriate advancement flap was drawn incorporating the defect and placing the expected incisions within the relaxed skin tension lines where possible. The area thus outlined was incised deep to adipose tissue with a #15 scalpel blade. The skin margins were undermined to an appropriate distance in all directions utilizing iris scissors. Following this, the designed flap was advanced and carried over into the primary defect and sutured into place. Crescentic Advancement Flap Text: The defect edges were debeveled with a #15 scalpel blade.  Given the location of the defect and the proximity to free margins a crescentic advancement flap was deemed most appropriate.  Using a sterile surgical marker, the appropriate advancement flap was drawn incorporating the defect and placing the expected incisions within the relaxed skin tension lines where possible.    The area thus outlined was incised deep to adipose tissue with a #15 scalpel blade.  The skin margins were undermined to an appropriate distance in all directions utilizing iris scissors. A-T Advancement Flap Text: The defect edges were debeveled with a #15 scalpel blade.  Given the location of the defect, shape of the defect and the proximity to free margins an A-T advancement flap was deemed most appropriate.  Using a sterile surgical marker, an appropriate advancement flap was drawn incorporating the defect and placing the expected incisions within the relaxed skin tension lines where possible.    The area thus outlined was incised deep to adipose tissue with a #15 scalpel blade.  The skin margins were undermined to an appropriate distance in all directions utilizing iris scissors. O-T Advancement Flap Text: The defect edges were debeveled with a #15 scalpel blade.  Given the location of the defect, shape of the defect and the proximity to free margins an O-T advancement flap was deemed most appropriate.  Using a sterile surgical marker, an appropriate advancement flap was drawn incorporating the defect and placing the expected incisions within the relaxed skin tension lines where possible.    The area thus outlined was incised deep to adipose tissue with a #15 scalpel blade.  The skin margins were undermined to an appropriate distance in all directions utilizing iris scissors. O-L Flap Text: The defect edges were debeveled with a #15 scalpel blade.  Given the location of the defect, shape of the defect and the proximity to free margins an O-L flap was deemed most appropriate.  Using a sterile surgical marker, an appropriate advancement flap was drawn incorporating the defect and placing the expected incisions within the relaxed skin tension lines where possible.    The area thus outlined was incised deep to adipose tissue with a #15 scalpel blade.  The skin margins were undermined to an appropriate distance in all directions utilizing iris scissors. O-Z Flap Text: The defect edges were debeveled with a #15 scalpel blade. Given the location of the defect, shape of the defect and the proximity to free margins an O-Z flap was deemed most appropriate. Using a sterile surgical marker, an appropriate transposition flap was drawn incorporating the defect and placing the expected incisions within the relaxed skin tension lines where possible. The area thus outlined was incised deep to adipose tissue with a #15 scalpel blade. The skin margins were undermined to an appropriate distance in all directions utilizing iris scissors. Following this, the designed flap was carried over into the primary defect and sutured into place. Double O-Z Flap Text: The defect edges were debeveled with a #15 scalpel blade. Given the location of the defect, shape of the defect and the proximity to free margins a Double O-Z flap was deemed most appropriate. Using a sterile surgical marker, an appropriate transposition flap was drawn incorporating the defect and placing the expected incisions within the relaxed skin tension lines where possible. The area thus outlined was incised deep to adipose tissue with a #15 scalpel blade. The skin margins were undermined to an appropriate distance in all directions utilizing iris scissors. Following this, the designed flap was carried over into the primary defect and sutured into place. V-Y Flap Text: The defect edges were debeveled with a #15 scalpel blade.  Given the location of the defect, shape of the defect and the proximity to free margins a V-Y flap was deemed most appropriate.  Using a sterile surgical marker, an appropriate advancement flap was drawn incorporating the defect and placing the expected incisions within the relaxed skin tension lines where possible.    The area thus outlined was incised deep to adipose tissue with a #15 scalpel blade.  The skin margins were undermined to an appropriate distance in all directions utilizing iris scissors. Advancement-Rotation Flap Text: The defect edges were debeveled with a #15 scalpel blade.  Given the location of the defect, shape of the defect and the proximity to free margins an advancement-rotation flap was deemed most appropriate.  Using a sterile surgical marker, an appropriate flap was drawn incorporating the defect and placing the expected incisions within the relaxed skin tension lines where possible. The area thus outlined was incised deep to adipose tissue with a #15 scalpel blade.  The skin margins were undermined to an appropriate distance in all directions utilizing iris scissors. Mercedes Flap Text: The defect edges were debeveled with a #15 scalpel blade.  Given the location of the defect, shape of the defect and the proximity to free margins a Mercedes flap was deemed most appropriate.  Using a sterile surgical marker, an appropriate advancement flap was drawn incorporating the defect and placing the expected incisions within the relaxed skin tension lines where possible. The area thus outlined was incised deep to adipose tissue with a #15 scalpel blade.  The skin margins were undermined to an appropriate distance in all directions utilizing iris scissors. Modified Advancement Flap Text: The defect edges were debeveled with a #15 scalpel blade.  Given the location of the defect, shape of the defect and the proximity to free margins a modified advancement flap was deemed most appropriate.  Using a sterile surgical marker, an appropriate advancement flap was drawn incorporating the defect and placing the expected incisions within the relaxed skin tension lines where possible.    The area thus outlined was incised deep to adipose tissue with a #15 scalpel blade.  The skin margins were undermined to an appropriate distance in all directions utilizing iris scissors. Mucosal Advancement Flap Text: Given the location of the defect, shape of the defect and the proximity to free margins a mucosal advancement flap was deemed most appropriate. Incisions were made with a 15 blade scalpel in the appropriate fashion along the cutaneous vermilion border and the mucosal lip. The remaining actinically damaged mucosal tissue was excised.  The mucosal advancement flap was then elevated to the gingival sulcus with care taken to preserve the neurovascular structures and advanced into the primary defect. Care was taken to ensure that precise realignment of the vermilion border was achieved. Peng Advancement Flap Text: The defect edges were debeveled with a #15 scalpel blade. Given the location of the defect, shape of the defect and the proximity to free margins a Peng advancement flap was deemed most appropriate. Using a sterile surgical marker, an appropriate advancement flap was drawn incorporating the defect and placing the expected incisions within the relaxed skin tension lines where possible. The area thus outlined was incised deep to adipose tissue with a #15 scalpel blade. The skin margins were undermined to an appropriate distance in all directions utilizing iris scissors. Following this, the designed flap was advanced and carried over into the primary defect and sutured into place. Hatchet Flap Text: The defect edges were debeveled with a #15 scalpel blade.  Given the location of the defect, shape of the defect and the proximity to free margins a hatchet flap was deemed most appropriate.  Using a sterile surgical marker, an appropriate hatchet flap was drawn incorporating the defect and placing the expected incisions within the relaxed skin tension lines where possible.    The area thus outlined was incised deep to adipose tissue with a #15 scalpel blade.  The skin margins were undermined to an appropriate distance in all directions utilizing iris scissors. Rotation Flap Text: The defect edges were debeveled with a #15 scalpel blade.  Given the location of the defect, shape of the defect and the proximity to free margins a rotation flap was deemed most appropriate.  Using a sterile surgical marker, an appropriate rotation flap was drawn incorporating the defect and placing the expected incisions within the relaxed skin tension lines where possible.    The area thus outlined was incised deep to adipose tissue with a #15 scalpel blade.  The skin margins were undermined to an appropriate distance in all directions utilizing iris scissors. Bilateral Rotation Flap Text: The defect edges were debeveled with a #15 scalpel blade. Given the location of the defect, shape of the defect and the proximity to free margins a bilateral rotation flap was deemed most appropriate. Using a sterile surgical marker, an appropriate rotation flap was drawn incorporating the defect and placing the expected incisions within the relaxed skin tension lines where possible. The area thus outlined was incised deep to adipose tissue with a #15 scalpel blade. The skin margins were undermined to an appropriate distance in all directions utilizing iris scissors. Following this, the designed flap was carried over into the primary defect and sutured into place. Spiral Flap Text: The defect edges were debeveled with a #15 scalpel blade.  Given the location of the defect, shape of the defect and the proximity to free margins a spiral flap was deemed most appropriate.  Using a sterile surgical marker, an appropriate rotation flap was drawn incorporating the defect and placing the expected incisions within the relaxed skin tension lines where possible. The area thus outlined was incised deep to adipose tissue with a #15 scalpel blade.  The skin margins were undermined to an appropriate distance in all directions utilizing iris scissors. Staged Advancement Flap Text: The defect edges were debeveled with a #15 scalpel blade. Given the location of the defect, shape of the defect and the proximity to free margins a staged advancement flap was deemed most appropriate. Using a sterile surgical marker, an appropriate advancement flap was drawn incorporating the defect and placing the expected incisions within the relaxed skin tension lines where possible. The area thus outlined was incised deep to adipose tissue with a #15 scalpel blade. The skin margins were undermined to an appropriate distance in all directions utilizing iris scissors. Following this, the designed flap was carried over into the primary defect and sutured into place. Star Wedge Flap Text: The defect edges were debeveled with a #15 scalpel blade.  Given the location of the defect, shape of the defect and the proximity to free margins a star wedge flap was deemed most appropriate.  Using a sterile surgical marker, an appropriate rotation flap was drawn incorporating the defect and placing the expected incisions within the relaxed skin tension lines where possible. The area thus outlined was incised deep to adipose tissue with a #15 scalpel blade.  The skin margins were undermined to an appropriate distance in all directions utilizing iris scissors. Transposition Flap Text: The defect edges were debeveled with a #15 scalpel blade.  Given the location of the defect and the proximity to free margins a transposition flap was deemed most appropriate.  Using a sterile surgical marker, an appropriate transposition flap was drawn incorporating the defect.    The area thus outlined was incised deep to adipose tissue with a #15 scalpel blade.  The skin margins were undermined to an appropriate distance in all directions utilizing iris scissors. Muscle Hinge Flap Text: The defect edges were debeveled with a #15 scalpel blade.  Given the size, depth and location of the defect and the proximity to free margins a muscle hinge flap was deemed most appropriate.  Using a sterile surgical marker, an appropriate hinge flap was drawn incorporating the defect. The area thus outlined was incised with a #15 scalpel blade.  The skin margins were undermined to an appropriate distance in all directions utilizing iris scissors. Mustarde Flap Text: The defect edges were debeveled with a #15 scalpel blade.  Given the size, depth and location of the defect and the proximity to free margins a Mustarde flap was deemed most appropriate. Using a sterile surgical marker, an appropriate flap was drawn incorporating the defect. The area thus outlined was incised with a #15 scalpel blade. The skin margins were undermined to an appropriate distance in all directions utilizing iris scissors. Following this, the designed flap was carried into the primary defect and sutured into place. Nasal Turnover Hinge Flap Text: The defect edges were debeveled with a #15 scalpel blade.  Given the size, depth, location of the defect and the defect being full thickness a nasal turnover hinge flap was deemed most appropriate. Using a sterile surgical marker, an appropriate hinge flap was drawn incorporating the defect. The area thus outlined was incised with a #15 scalpel blade. The flap was designed to recreate the nasal mucosal lining and the alar rim. The skin margins were undermined to an appropriate distance in all directions utilizing iris scissors. Following this, the designed flap was carried over into the primary defect and sutured into place Nasalis-Muscle-Based Myocutaneous Island Pedicle Flap Text: Using a #15 blade, an incision was made around the donor flap to the level of the nasalis muscle. Wide lateral undermining was then performed in both the subcutaneous plane above the nasalis muscle, and in a submuscular plane just above periosteum. This allowed the formation of a free nasalis muscle axial pedicle (based on the angular artery) which was still attached to the actual cutaneous flap, increasing its mobility and vascular viability. Hemostasis was obtained with pinpoint electrocoagulation. The flap was mobilized into position and the pivotal anchor points positioned and stabilized with buried interrupted sutures. Subcutaneous and dermal tissues were closed in a multilayered fashion with sutures. Tissue redundancies were excised, and the epidermal edges were apposed without significant tension and sutured with sutures. Orbicularis Oris Muscle Flap Text: The defect edges were debeveled with a #15 scalpel blade.  Given that the defect affected the competency of the oral sphincter an orbicularis oris muscle flap was deemed most appropriate to restore this competency and normal muscle function.  Using a sterile surgical marker, an appropriate flap was drawn incorporating the defect. The area thus outlined was incised with a #15 scalpel blade. Following this, the designed flap was carried over into the primary defect and sutured into place. Melolabial Transposition Flap Text: The defect edges were debeveled with a #15 scalpel blade.  Given the location of the defect and the proximity to free margins a melolabial flap was deemed most appropriate.  Using a sterile surgical marker, an appropriate melolabial transposition flap was drawn incorporating the defect.    The area thus outlined was incised deep to adipose tissue with a #15 scalpel blade.  The skin margins were undermined to an appropriate distance in all directions utilizing iris scissors. Rhombic Flap Text: The defect edges were debeveled with a #15 scalpel blade.  Given the location of the defect and the proximity to free margins a rhombic flap was deemed most appropriate.  Using a sterile surgical marker, an appropriate rhombic flap was drawn incorporating the defect.    The area thus outlined was incised deep to adipose tissue with a #15 scalpel blade.  The skin margins were undermined to an appropriate distance in all directions utilizing iris scissors. Rhomboid Transposition Flap Text: The defect edges were debeveled with a #15 scalpel blade. Given the location of the defect and the proximity to free margins a rhomboid transposition flap was deemed most appropriate. Using a sterile surgical marker, an appropriate rhomboid flap was drawn incorporating the defect. The area thus outlined was incised deep to adipose tissue with a #15 scalpel blade. The skin margins were undermined to an appropriate distance in all directions utilizing iris scissors. Following this, the designed flap was carried over into the primary defect and sutured into place. Bi-Rhombic Flap Text: The defect edges were debeveled with a #15 scalpel blade.  Given the location of the defect and the proximity to free margins a bi-rhombic flap was deemed most appropriate.  Using a sterile surgical marker, an appropriate rhombic flap was drawn incorporating the defect. The area thus outlined was incised deep to adipose tissue with a #15 scalpel blade.  The skin margins were undermined to an appropriate distance in all directions utilizing iris scissors. Helical Rim Advancement Flap Text: The defect edges were debeveled with a #15 blade scalpel.  Given the location of the defect and the proximity to free margins (helical rim) a double helical rim advancement flap was deemed most appropriate.  Using a sterile surgical marker, the appropriate advancement flaps were drawn incorporating the defect and placing the expected incisions between the helical rim and antihelix where possible.  The area thus outlined was incised through and through with a #15 scalpel blade.  With a skin hook and iris scissors, the flaps were gently and sharply undermined and freed up. Bilateral Helical Rim Advancement Flap Text: The defect edges were debeveled with a #15 blade scalpel.  Given the location of the defect and the proximity to free margins (helical rim) a bilateral helical rim advancement flap was deemed most appropriate.  Using a sterile surgical marker, the appropriate advancement flaps were drawn incorporating the defect and placing the expected incisions between the helical rim and antihelix where possible.  The area thus outlined was incised through and through with a #15 scalpel blade.  With a skin hook and iris scissors, the flaps were gently and sharply undermined and freed up. Ear Star Wedge Flap Text: The defect edges were debeveled with a #15 blade scalpel.  Given the location of the defect and the proximity to free margins (helical rim) an ear star wedge flap was deemed most appropriate.  Using a sterile surgical marker, the appropriate flap was drawn incorporating the defect and placing the expected incisions between the helical rim and antihelix where possible.  The area thus outlined was incised through and through with a #15 scalpel blade. Banner Transposition Flap Text: The defect edges were debeveled with a #15 scalpel blade.  Given the location of the defect and the proximity to free margins a Banner transposition flap was deemed most appropriate.  Using a sterile surgical marker, an appropriate flap drawn around the defect. The area thus outlined was incised deep to adipose tissue with a #15 scalpel blade.  The skin margins were undermined to an appropriate distance in all directions utilizing iris scissors. Bilobed Flap Text: The defect edges were debeveled with a #15 scalpel blade.  Given the location of the defect and the proximity to free margins a bilobe flap was deemed most appropriate.  Using a sterile surgical marker, an appropriate bilobe flap drawn around the defect.    The area thus outlined was incised deep to adipose tissue with a #15 scalpel blade.  The skin margins were undermined to an appropriate distance in all directions utilizing iris scissors. Bilobed Transposition Flap Text: The defect edges were debeveled with a #15 scalpel blade.  Given the location of the defect and the proximity to free margins a bilobed transposition flap was deemed most appropriate.  Using a sterile surgical marker, an appropriate bilobe flap drawn around the defect.    The area thus outlined was incised deep to adipose tissue with a #15 scalpel blade.  The skin margins were undermined to an appropriate distance in all directions utilizing iris scissors. Trilobed Flap Text: The defect edges were debeveled with a #15 scalpel blade.  Given the location of the defect and the proximity to free margins a trilobed flap was deemed most appropriate.  Using a sterile surgical marker, an appropriate trilobed flap drawn around the defect.    The area thus outlined was incised deep to adipose tissue with a #15 scalpel blade.  The skin margins were undermined to an appropriate distance in all directions utilizing iris scissors. Dorsal Nasal Flap Text: The defect edges were debeveled with a #15 scalpel blade.  Given the location of the defect and the proximity to free margins a dorsal nasal flap was deemed most appropriate.  Using a sterile surgical marker, an appropriate dorsal nasal flap was drawn around the defect.    The area thus outlined was incised deep to adipose tissue with a #15 scalpel blade.  The skin margins were undermined to an appropriate distance in all directions utilizing iris scissors. Island Pedicle Flap Text: The defect edges were debeveled with a #15 scalpel blade.  Given the location of the defect, shape of the defect and the proximity to free margins an island pedicle advancement flap was deemed most appropriate.  Using a sterile surgical marker, an appropriate advancement flap was drawn incorporating the defect, outlining the appropriate donor tissue and placing the expected incisions within the relaxed skin tension lines where possible.    The area thus outlined was incised deep to adipose tissue with a #15 scalpel blade.  The skin margins were undermined to an appropriate distance in all directions around the primary defect and laterally outward around the island pedicle utilizing iris scissors.  There was minimal undermining beneath the pedicle flap. mom and dad Island Pedicle Flap With Canthal Suspension Text: The defect edges were debeveled with a #15 scalpel blade.  Given the location of the defect, shape of the defect and the proximity to free margins an island pedicle advancement flap was deemed most appropriate.  Using a sterile surgical marker, an appropriate advancement flap was drawn incorporating the defect, outlining the appropriate donor tissue and placing the expected incisions within the relaxed skin tension lines where possible. The area thus outlined was incised deep to adipose tissue with a #15 scalpel blade.  The skin margins were undermined to an appropriate distance in all directions around the primary defect and laterally outward around the island pedicle utilizing iris scissors.  There was minimal undermining beneath the pedicle flap. A suspension suture was placed in the canthal tendon to prevent tension and prevent ectropion. Alar Island Pedicle Flap Text: The defect edges were debeveled with a #15 scalpel blade.  Given the location of the defect, shape of the defect and the proximity to the alar rim an island pedicle advancement flap was deemed most appropriate.  Using a sterile surgical marker, an appropriate advancement flap was drawn incorporating the defect, outlining the appropriate donor tissue and placing the expected incisions within the nasal ala running parallel to the alar rim. The area thus outlined was incised with a #15 scalpel blade.  The skin margins were undermined minimally to an appropriate distance in all directions around the primary defect and laterally outward around the island pedicle utilizing iris scissors.  There was minimal undermining beneath the pedicle flap. Double Island Pedicle Flap Text: The defect edges were debeveled with a #15 scalpel blade.  Given the location of the defect, shape of the defect and the proximity to free margins a double island pedicle advancement flap was deemed most appropriate.  Using a sterile surgical marker, an appropriate advancement flap was drawn incorporating the defect, outlining the appropriate donor tissue and placing the expected incisions within the relaxed skin tension lines where possible.    The area thus outlined was incised deep to adipose tissue with a #15 scalpel blade.  The skin margins were undermined to an appropriate distance in all directions around the primary defect and laterally outward around the island pedicle utilizing iris scissors.  There was minimal undermining beneath the pedicle flap. Island Pedicle Flap-Requiring Vessel Identification Text: The defect edges were debeveled with a #15 scalpel blade.  Given the location of the defect, shape of the defect and the proximity to free margins an island pedicle advancement flap was deemed most appropriate.  Using a sterile surgical marker, an appropriate advancement flap was drawn, based on the axial vessel mentioned above, incorporating the defect, outlining the appropriate donor tissue and placing the expected incisions within the relaxed skin tension lines where possible.    The area thus outlined was incised deep to adipose tissue with a #15 scalpel blade.  The skin margins were undermined to an appropriate distance in all directions around the primary defect and laterally outward around the island pedicle utilizing iris scissors.  There was minimal undermining beneath the pedicle flap. Keystone Flap Text: The defect edges were debeveled with a #15 scalpel blade.  Given the location of the defect, shape of the defect a keystone flap was deemed most appropriate.  Using a sterile surgical marker, an appropriate keystone flap was drawn incorporating the defect, outlining the appropriate donor tissue and placing the expected incisions within the relaxed skin tension lines where possible. The area thus outlined was incised deep to adipose tissue with a #15 scalpel blade.  The skin margins were undermined to an appropriate distance in all directions around the primary defect and laterally outward around the flap utilizing iris scissors. O-T Plasty Text: The defect edges were debeveled with a #15 scalpel blade.  Given the location of the defect, shape of the defect and the proximity to free margins an O-T plasty was deemed most appropriate.  Using a sterile surgical marker, an appropriate O-T plasty was drawn incorporating the defect and placing the expected incisions within the relaxed skin tension lines where possible.    The area thus outlined was incised deep to adipose tissue with a #15 scalpel blade.  The skin margins were undermined to an appropriate distance in all directions utilizing iris scissors. O-Z Plasty Text: The defect edges were debeveled with a #15 scalpel blade.  Given the location of the defect, shape of the defect and the proximity to free margins an O-Z plasty (double transposition flap) was deemed most appropriate.  Using a sterile surgical marker, the appropriate transposition flaps were drawn incorporating the defect and placing the expected incisions within the relaxed skin tension lines where possible.    The area thus outlined was incised deep to adipose tissue with a #15 scalpel blade.  The skin margins were undermined to an appropriate distance in all directions utilizing iris scissors.  Hemostasis was achieved with electrocautery.  The flaps were then transposed into place, one clockwise and the other counterclockwise, and anchored with interrupted buried subcutaneous sutures. Double O-Z Plasty Text: The defect edges were debeveled with a #15 scalpel blade. Given the location of the defect, shape of the defect and the proximity to free margins a Double O-Z plasty (double transposition flap) was deemed most appropriate. Using a sterile surgical marker, the appropriate transposition flaps were drawn incorporating the defect and placing the expected incisions within the relaxed skin tension lines where possible. The area thus outlined was incised deep to adipose tissue with a #15 scalpel blade. The skin margins were undermined to an appropriate distance in all directions utilizing iris scissors. Hemostasis was achieved with electrocautery. The flaps were then transposed and carried over into place, one clockwise and the other counterclockwise, and anchored with interrupted buried subcutaneous sutures. V-Y Plasty Text: The defect edges were debeveled with a #15 scalpel blade.  Given the location of the defect, shape of the defect and the proximity to free margins an V-Y advancement flap was deemed most appropriate.  Using a sterile surgical marker, an appropriate advancement flap was drawn incorporating the defect and placing the expected incisions within the relaxed skin tension lines where possible.    The area thus outlined was incised deep to adipose tissue with a #15 scalpel blade.  The skin margins were undermined to an appropriate distance in all directions utilizing iris scissors. H Plasty Text: Given the location of the defect, shape of the defect and the proximity to free margins a H-plasty was deemed most appropriate for repair.  Using a sterile surgical marker, the appropriate advancement arms of the H-plasty were drawn incorporating the defect and placing the expected incisions within the relaxed skin tension lines where possible. The area thus outlined was incised deep to adipose tissue with a #15 scalpel blade. The skin margins were undermined to an appropriate distance in all directions utilizing iris scissors.  The opposing advancement arms were then advanced into place in opposite direction and anchored with interrupted buried subcutaneous sutures. W Plasty Text: The lesion was extirpated to the level of the fat with a #15 scalpel blade.  Given the location of the defect, shape of the defect and the proximity to free margins a W-plasty was deemed most appropriate for repair.  Using a sterile surgical marker, the appropriate transposition arms of the W-plasty were drawn incorporating the defect and placing the expected incisions within the relaxed skin tension lines where possible.    The area thus outlined was incised deep to adipose tissue with a #15 scalpel blade.  The skin margins were undermined to an appropriate distance in all directions utilizing iris scissors.  The opposing transposition arms were then transposed into place in opposite direction and anchored with interrupted buried subcutaneous sutures. Z Plasty Text: The lesion was extirpated to the level of the fat with a #15 scalpel blade.  Given the location of the defect, shape of the defect and the proximity to free margins a Z-plasty was deemed most appropriate for repair.  Using a sterile surgical marker, the appropriate transposition arms of the Z-plasty were drawn incorporating the defect and placing the expected incisions within the relaxed skin tension lines where possible.    The area thus outlined was incised deep to adipose tissue with a #15 scalpel blade.  The skin margins were undermined to an appropriate distance in all directions utilizing iris scissors.  The opposing transposition arms were then transposed into place in opposite direction and anchored with interrupted buried subcutaneous sutures. Double Z Plasty Text: The lesion was extirpated to the level of the fat with a #15 scalpel blade. Given the location of the defect, shape of the defect and the proximity to free margins a double Z-plasty was deemed most appropriate for repair. Using a sterile surgical marker, the appropriate transposition arms of the double Z-plasty were drawn incorporating the defect and placing the expected incisions within the relaxed skin tension lines where possible. The area thus outlined was incised deep to adipose tissue with a #15 scalpel blade. The skin margins were undermined to an appropriate distance in all directions utilizing iris scissors. The opposing transposition arms were then transposed and carried over into place in opposite direction and anchored with interrupted buried subcutaneous sutures. Zygomaticofacial Flap Text: Given the location of the defect, shape of the defect and the proximity to free margins a zygomaticofacial flap was deemed most appropriate for repair. Using a sterile surgical marker, the appropriate flap was drawn incorporating the defect and placing the expected incisions within the relaxed skin tension lines where possible. The area thus outlined was incised deep to adipose tissue with a #15 scalpel blade with preservation of a vascular pedicle.  The skin margins were undermined to an appropriate distance in all directions utilizing iris scissors. The flap was then carried over into the defect and anchored with interrupted buried subcutaneous sutures. Cheek Interpolation Flap Text: A decision was made to reconstruct the defect utilizing an interpolation axial flap and a staged reconstruction.  A telfa template was made of the defect.  This telfa template was then used to outline the Cheek Interpolation flap.  The donor area for the pedicle flap was then injected with anesthesia.  The flap was excised through the skin and subcutaneous tissue down to the layer of the underlying musculature.  The interpolation flap was carefully excised within this deep plane to maintain its blood supply.  The edges of the donor site were undermined.   The donor site was closed in a primary fashion.  The pedicle was then rotated into position and sutured.  Once the tube was sutured into place, adequate blood supply was confirmed with blanching and refill.  The pedicle was then wrapped with xeroform gauze and dressed appropriately with a telfa and gauze bandage to ensure continued blood supply and protect the attached pedicle. Cheek-To-Nose Interpolation Flap Text: A decision was made to reconstruct the defect utilizing an interpolation axial flap and a staged reconstruction.  A telfa template was made of the defect.  This telfa template was then used to outline the Cheek-To-Nose Interpolation flap.  The donor area for the pedicle flap was then injected with anesthesia.  The flap was excised through the skin and subcutaneous tissue down to the layer of the underlying musculature.  The interpolation flap was carefully excised within this deep plane to maintain its blood supply.  The edges of the donor site were undermined.   The donor site was closed in a primary fashion.  The pedicle was then rotated into position and sutured.  Once the tube was sutured into place, adequate blood supply was confirmed with blanching and refill.  The pedicle was then wrapped with xeroform gauze and dressed appropriately with a telfa and gauze bandage to ensure continued blood supply and protect the attached pedicle. Interpolation Flap Text: A decision was made to reconstruct the defect utilizing an interpolation axial flap and a staged reconstruction.  A telfa template was made of the defect.  This telfa template was then used to outline the interpolation flap.  The donor area for the pedicle flap was then injected with anesthesia.  The flap was excised through the skin and subcutaneous tissue down to the layer of the underlying musculature.  The interpolation flap was carefully excised within this deep plane to maintain its blood supply.  The edges of the donor site were undermined.   The donor site was closed in a primary fashion.  The pedicle was then rotated into position and sutured.  Once the tube was sutured into place, adequate blood supply was confirmed with blanching and refill.  The pedicle was then wrapped with xeroform gauze and dressed appropriately with a telfa and gauze bandage to ensure continued blood supply and protect the attached pedicle. Melolabial Interpolation Flap Text: A decision was made to reconstruct the defect utilizing an interpolation axial flap and a staged reconstruction.  A telfa template was made of the defect.  This telfa template was then used to outline the melolabial interpolation flap.  The donor area for the pedicle flap was then injected with anesthesia.  The flap was excised through the skin and subcutaneous tissue down to the layer of the underlying musculature.  The pedicle flap was carefully excised within this deep plane to maintain its blood supply.  The edges of the donor site were undermined.   The donor site was closed in a primary fashion.  The pedicle was then rotated into position and sutured.  Once the tube was sutured into place, adequate blood supply was confirmed with blanching and refill.  The pedicle was then wrapped with xeroform gauze and dressed appropriately with a telfa and gauze bandage to ensure continued blood supply and protect the attached pedicle. Mastoid Interpolation Flap Text: A decision was made to reconstruct the defect utilizing an interpolation axial flap and a staged reconstruction.  A telfa template was made of the defect.  This telfa template was then used to outline the mastoid interpolation flap.  The donor area for the pedicle flap was then injected with anesthesia.  The flap was excised through the skin and subcutaneous tissue down to the layer of the underlying musculature.  The pedicle flap was carefully excised within this deep plane to maintain its blood supply.  The edges of the donor site were undermined.   The donor site was closed in a primary fashion.  The pedicle was then rotated into position and sutured.  Once the tube was sutured into place, adequate blood supply was confirmed with blanching and refill.  The pedicle was then wrapped with xeroform gauze and dressed appropriately with a telfa and gauze bandage to ensure continued blood supply and protect the attached pedicle. Posterior Auricular Interpolation Flap Text: A decision was made to reconstruct the defect utilizing an interpolation axial flap and a staged reconstruction.  A telfa template was made of the defect.  This telfa template was then used to outline the posterior auricular interpolation flap.  The donor area for the pedicle flap was then injected with anesthesia.  The flap was excised through the skin and subcutaneous tissue down to the layer of the underlying musculature.  The pedicle flap was carefully excised within this deep plane to maintain its blood supply.  The edges of the donor site were undermined.   The donor site was closed in a primary fashion.  The pedicle was then rotated into position and sutured.  Once the tube was sutured into place, adequate blood supply was confirmed with blanching and refill.  The pedicle was then wrapped with xeroform gauze and dressed appropriately with a telfa and gauze bandage to ensure continued blood supply and protect the attached pedicle. Paramedian Forehead Flap Text: A decision was made to reconstruct the defect utilizing an interpolation axial flap and a staged reconstruction.  A telfa template was made of the defect.  This telfa template was then used to outline the paramedian forehead pedicle flap.  The donor area for the pedicle flap was then injected with anesthesia.  The flap was excised through the skin and subcutaneous tissue down to the layer of the underlying musculature.  The pedicle flap was carefully excised within this deep plane to maintain its blood supply.  The edges of the donor site were undermined.   The donor site was closed in a primary fashion.  The pedicle was then rotated into position and sutured.  Once the tube was sutured into place, adequate blood supply was confirmed with blanching and refill.  The pedicle was then wrapped with xeroform gauze and dressed appropriately with a telfa and gauze bandage to ensure continued blood supply and protect the attached pedicle. Abbe Flap (Upper To Lower Lip) Text: The defect of the lower lip was assessed and measured.  Given the location and size of the defect, an Abbe flap was deemed most appropriate. Using a sterile surgical marker, an appropriate Abbe flap was measured and drawn on the upper lip. Local anesthesia was then infiltrated.  A scalpel was then used to incise the upper lip through and through the skin, vermilion, muscle and mucosa, leaving the flap pedicled on the opposite side.  The flap was then rotated and transferred to the lower lip defect.  The flap was then sutured into place with a three layer technique, closing the orbicularis oris muscle layer with subcutaneous buried sutures, followed by a mucosal layer and an epidermal layer. Abbe Flap (Lower To Upper Lip) Text: The defect of the upper lip was assessed and measured.  Given the location and size of the defect, an Abbe flap was deemed most appropriate. Using a sterile surgical marker, an appropriate Abbe flap was measured and drawn on the lower lip. Local anesthesia was then infiltrated. A scalpel was then used to incise the upper lip through and through the skin, vermilion, muscle and mucosa, leaving the flap pedicled on the opposite side.  The flap was then rotated and transferred to the lower lip defect.  The flap was then sutured into place with a three layer technique, closing the orbicularis oris muscle layer with subcutaneous buried sutures, followed by a mucosal layer and an epidermal layer. Estlander Flap (Upper To Lower Lip) Text: The defect of the lower lip was assessed and measured.  Given the location and size of the defect, an Estlander flap was deemed most appropriate. Using a sterile surgical marker, an appropriate Estlander flap was measured and drawn on the upper lip. Local anesthesia was then infiltrated. A scalpel was then used to incise the lateral aspect of the flap, through skin, muscle and mucosa, leaving the flap pedicled medially.  The flap was then rotated and positioned to fill the lower lip defect.  The flap was then sutured into place with a three layer technique, closing the orbicularis oris muscle layer with subcutaneous buried sutures, followed by a mucosal layer and an epidermal layer. Estlander Flap (Lower To Upper Lip) Text: The defect of the lower lip was assessed and measured.  Given the location and size of the defect, an Estlander flap was deemed most appropriate. Using a sterile surgical marker, an appropriate Estlander flap was measured and drawn on the upper lip. Local anesthesia was then infiltrated. A scalpel was then used to incise the lateral aspect of the flap, through skin, muscle and mucosa, leaving the flap pedicled medially.  The flap was then rotated and positioned to fill the lower lip defect.  The flap was then sutured into place with a three layer technique, closing the orbicularis oris muscle layer with subcutaneous buried sutures, followed by a mucosal layer and an epidermal layer. Lip Wedge Excision Repair Text: Given the location of the defect and the proximity to free margins a full thickness wedge repair was deemed most appropriate.  Using a sterile surgical marker, the appropriate repair was drawn incorporating the defect and placing the expected incisions perpendicular to the vermilion border.  The vermilion border was also meticulously outlined to ensure appropriate reapproximation during the repair.  The area thus outlined was incised through and through with a #15 scalpel blade.  The muscularis and dermis were reaproximated with deep sutures following hemostasis. Care was taken to realign the vermilion border before proceeding with the superficial closure.  Once the vermilion was realigned the superfical and mucosal closure was finished. Ftsg Text: The defect edges were debeveled with a #15 scalpel blade.  Given the location of the defect, shape of the defect and the proximity to free margins a full thickness skin graft was deemed most appropriate.  Using a sterile surgical marker, the primary defect shape was transferred to the donor site. The area thus outlined was incised deep to adipose tissue with a #15 scalpel blade.  The harvested graft was then trimmed of adipose tissue until only dermis and epidermis was left.  The skin margins of the secondary defect were undermined to an appropriate distance in all directions utilizing iris scissors.  The secondary defect was closed with interrupted buried subcutaneous sutures.  The skin edges were then re-apposed with running  sutures.  The skin graft was then placed in the primary defect and oriented appropriately. Split-Thickness Skin Graft Text: The defect edges were debeveled with a #15 scalpel blade.  Given the location of the defect, shape of the defect and the proximity to free margins a split thickness skin graft was deemed most appropriate.  Using a sterile surgical marker, the primary defect shape was transferred to the donor site. The split thickness graft was then harvested.  The skin graft was then placed in the primary defect and oriented appropriately. Pinch Graft Text: The defect edges were debeveled with a #15 scalpel blade. Given the location of the defect, shape of the defect and the proximity to free margins a pinch graft was deemed most appropriate. Using a sterile surgical marker, the primary defect shape was transferred to the donor site. The area thus outlined was incised deep to adipose tissue with a #15 scalpel blade.  The harvested graft was then trimmed of adipose tissue until only dermis and epidermis was left. The skin margins of the secondary defect were undermined to an appropriate distance in all directions utilizing iris scissors.  The secondary defect was closed with interrupted buried subcutaneous sutures.  The skin edges were then re-apposed with running  sutures.  The skin graft was then placed in the primary defect and oriented appropriately. Burow's Graft Text: The defect edges were debeveled with a #15 scalpel blade. Given the location of the defect, shape of the defect, the proximity to free margins and the presence of a standing cone deformity a Burow's skin graft was deemed most appropriate. The standing cone was removed and this tissue was then trimmed to the shape of the primary defect. The adipose tissue was also removed until only dermis and epidermis were left.  The skin margins of the secondary defect were undermined to an appropriate distance in all directions utilizing iris scissors.  The secondary defect was closed with interrupted buried subcutaneous sutures.  The skin edges were then re-apposed with running  sutures.  The skin graft was then placed in the primary defect and oriented appropriately. Cartilage Graft Text: The defect edges were debeveled with a #15 scalpel blade.  Given the location of the defect, shape of the defect, the fact the defect involved a full thickness cartilage defect a cartilage graft was deemed most appropriate.  An appropriate donor site was identified, cleansed, and anesthetized. The cartilage graft was then harvested and transferred to the recipient site, oriented appropriately and then sutured into place.  The secondary defect was then repaired using a primary closure. Composite Graft Text: The defect edges were debeveled with a #15 scalpel blade.  Given the location of the defect, shape of the defect, the proximity to free margins and the fact the defect was full thickness a composite graft was deemed most appropriate.  The defect was outline and then transferred to the donor site.  A full thickness graft was then excised from the donor site. The graft was then placed in the primary defect, oriented appropriately and then sutured into place.  The secondary defect was then repaired using a primary closure. Epidermal Autograft Text: The defect edges were debeveled with a #15 scalpel blade.  Given the location of the defect, shape of the defect and the proximity to free margins an epidermal autograft was deemed most appropriate.  Using a sterile surgical marker, the primary defect shape was transferred to the donor site. The epidermal graft was then harvested.  The skin graft was then placed in the primary defect and oriented appropriately. Dermal Autograft Text: The defect edges were debeveled with a #15 scalpel blade.  Given the location of the defect, shape of the defect and the proximity to free margins a dermal autograft was deemed most appropriate.  Using a sterile surgical marker, the primary defect shape was transferred to the donor site. The area thus outlined was incised deep to adipose tissue with a #15 scalpel blade.  The harvested graft was then trimmed of adipose and epidermal tissue until only dermis was left.  The skin graft was then placed in the primary defect and oriented appropriately. Skin Substitute Text: The defect edges were debeveled with a #15 scalpel blade.  Given the location of the defect, shape of the defect and the proximity to free margins a skin substitute graft was deemed most appropriate.  The graft material was trimmed to fit the size of the defect. The graft was then placed in the primary defect and oriented appropriately. Tissue Cultured Epidermal Autograft Text: The defect edges were debeveled with a #15 scalpel blade.  Given the location of the defect, shape of the defect and the proximity to free margins a tissue cultured epidermal autograft was deemed most appropriate.  The graft was then trimmed to fit the size of the defect.  The graft was then placed in the primary defect and oriented appropriately. Xenograft Text: The defect edges were debeveled with a #15 scalpel blade.  Given the location of the defect, shape of the defect and the proximity to free margins a xenograft was deemed most appropriate.  The graft was then trimmed to fit the size of the defect.  The graft was then placed in the primary defect and oriented appropriately. Purse String (Intermediate) Text: Given the location of the defect and the characteristics of the surrounding skin a pursestring intermediate closure was deemed most appropriate.  Undermining was performed circumfirentially around the surgical defect.  A purstring suture was then placed and tightened. Purse String (Simple) Text: Given the location of the defect and the characteristics of the surrounding skin a purse string simple closure was deemed most appropriate.  Undermining was performed circumferentially around the surgical defect.  A purse string suture was then placed and tightened. Partial Purse String (Intermediate) Text: Given the location of the defect and the characteristics of the surrounding skin an intermediate purse string closure was deemed most appropriate.  Undermining was performed circumferentially around the surgical defect.  A purse string suture was then placed and tightened. Wound tension of the circular defect prevented complete closure of the wound. Partial Purse String (Simple) Text: Given the location of the defect and the characteristics of the surrounding skin a simple purse string closure was deemed most appropriate.  Undermining was performed circumferentially around the surgical defect.  A purse string suture was then placed and tightened. Wound tension of the circular defect prevented complete closure of the wound. Complex Repair And Single Advancement Flap Text: The defect edges were debeveled with a #15 scalpel blade.  The primary defect was closed partially with a complex linear closure.  Given the location of the remaining defect, shape of the defect and the proximity to free margins a single advancement flap was deemed most appropriate for complete closure of the defect.  Using a sterile surgical marker, an appropriate advancement flap was drawn incorporating the defect and placing the expected incisions within the relaxed skin tension lines where possible.    The area thus outlined was incised deep to adipose tissue with a #15 scalpel blade.  The skin margins were undermined to an appropriate distance in all directions utilizing iris scissors. Complex Repair And Double Advancement Flap Text: The defect edges were debeveled with a #15 scalpel blade.  The primary defect was closed partially with a complex linear closure.  Given the location of the remaining defect, shape of the defect and the proximity to free margins a double advancement flap was deemed most appropriate for complete closure of the defect.  Using a sterile surgical marker, an appropriate advancement flap was drawn incorporating the defect and placing the expected incisions within the relaxed skin tension lines where possible.    The area thus outlined was incised deep to adipose tissue with a #15 scalpel blade.  The skin margins were undermined to an appropriate distance in all directions utilizing iris scissors. Complex Repair And Modified Advancement Flap Text: The defect edges were debeveled with a #15 scalpel blade.  The primary defect was closed partially with a complex linear closure.  Given the location of the remaining defect, shape of the defect and the proximity to free margins a modified advancement flap was deemed most appropriate for complete closure of the defect.  Using a sterile surgical marker, an appropriate advancement flap was drawn incorporating the defect and placing the expected incisions within the relaxed skin tension lines where possible.    The area thus outlined was incised deep to adipose tissue with a #15 scalpel blade.  The skin margins were undermined to an appropriate distance in all directions utilizing iris scissors. Complex Repair And A-T Advancement Flap Text: The defect edges were debeveled with a #15 scalpel blade.  The primary defect was closed partially with a complex linear closure.  Given the location of the remaining defect, shape of the defect and the proximity to free margins an A-T advancement flap was deemed most appropriate for complete closure of the defect.  Using a sterile surgical marker, an appropriate advancement flap was drawn incorporating the defect and placing the expected incisions within the relaxed skin tension lines where possible.    The area thus outlined was incised deep to adipose tissue with a #15 scalpel blade.  The skin margins were undermined to an appropriate distance in all directions utilizing iris scissors. Complex Repair And O-T Advancement Flap Text: The defect edges were debeveled with a #15 scalpel blade.  The primary defect was closed partially with a complex linear closure.  Given the location of the remaining defect, shape of the defect and the proximity to free margins an O-T advancement flap was deemed most appropriate for complete closure of the defect.  Using a sterile surgical marker, an appropriate advancement flap was drawn incorporating the defect and placing the expected incisions within the relaxed skin tension lines where possible.    The area thus outlined was incised deep to adipose tissue with a #15 scalpel blade.  The skin margins were undermined to an appropriate distance in all directions utilizing iris scissors. Complex Repair And O-L Flap Text: The defect edges were debeveled with a #15 scalpel blade.  The primary defect was closed partially with a complex linear closure.  Given the location of the remaining defect, shape of the defect and the proximity to free margins an O-L flap was deemed most appropriate for complete closure of the defect.  Using a sterile surgical marker, an appropriate flap was drawn incorporating the defect and placing the expected incisions within the relaxed skin tension lines where possible.    The area thus outlined was incised deep to adipose tissue with a #15 scalpel blade.  The skin margins were undermined to an appropriate distance in all directions utilizing iris scissors. Complex Repair And Bilobe Flap Text: The defect edges were debeveled with a #15 scalpel blade.  The primary defect was closed partially with a complex linear closure.  Given the location of the remaining defect, shape of the defect and the proximity to free margins a bilobe flap was deemed most appropriate for complete closure of the defect.  Using a sterile surgical marker, an appropriate advancement flap was drawn incorporating the defect and placing the expected incisions within the relaxed skin tension lines where possible.    The area thus outlined was incised deep to adipose tissue with a #15 scalpel blade.  The skin margins were undermined to an appropriate distance in all directions utilizing iris scissors. Complex Repair And Melolabial Flap Text: The defect edges were debeveled with a #15 scalpel blade.  The primary defect was closed partially with a complex linear closure.  Given the location of the remaining defect, shape of the defect and the proximity to free margins a melolabial flap was deemed most appropriate for complete closure of the defect.  Using a sterile surgical marker, an appropriate advancement flap was drawn incorporating the defect and placing the expected incisions within the relaxed skin tension lines where possible.    The area thus outlined was incised deep to adipose tissue with a #15 scalpel blade.  The skin margins were undermined to an appropriate distance in all directions utilizing iris scissors. Complex Repair And Rotation Flap Text: The defect edges were debeveled with a #15 scalpel blade.  The primary defect was closed partially with a complex linear closure.  Given the location of the remaining defect, shape of the defect and the proximity to free margins a rotation flap was deemed most appropriate for complete closure of the defect.  Using a sterile surgical marker, an appropriate advancement flap was drawn incorporating the defect and placing the expected incisions within the relaxed skin tension lines where possible.    The area thus outlined was incised deep to adipose tissue with a #15 scalpel blade.  The skin margins were undermined to an appropriate distance in all directions utilizing iris scissors. Complex Repair And Rhombic Flap Text: The defect edges were debeveled with a #15 scalpel blade.  The primary defect was closed partially with a complex linear closure.  Given the location of the remaining defect, shape of the defect and the proximity to free margins a rhombic flap was deemed most appropriate for complete closure of the defect.  Using a sterile surgical marker, an appropriate advancement flap was drawn incorporating the defect and placing the expected incisions within the relaxed skin tension lines where possible.    The area thus outlined was incised deep to adipose tissue with a #15 scalpel blade.  The skin margins were undermined to an appropriate distance in all directions utilizing iris scissors. Complex Repair And Transposition Flap Text: The defect edges were debeveled with a #15 scalpel blade.  The primary defect was closed partially with a complex linear closure.  Given the location of the remaining defect, shape of the defect and the proximity to free margins a transposition flap was deemed most appropriate for complete closure of the defect.  Using a sterile surgical marker, an appropriate advancement flap was drawn incorporating the defect and placing the expected incisions within the relaxed skin tension lines where possible.    The area thus outlined was incised deep to adipose tissue with a #15 scalpel blade.  The skin margins were undermined to an appropriate distance in all directions utilizing iris scissors. Complex Repair And V-Y Plasty Text: The defect edges were debeveled with a #15 scalpel blade.  The primary defect was closed partially with a complex linear closure.  Given the location of the remaining defect, shape of the defect and the proximity to free margins a V-Y plasty was deemed most appropriate for complete closure of the defect.  Using a sterile surgical marker, an appropriate advancement flap was drawn incorporating the defect and placing the expected incisions within the relaxed skin tension lines where possible.    The area thus outlined was incised deep to adipose tissue with a #15 scalpel blade.  The skin margins were undermined to an appropriate distance in all directions utilizing iris scissors. Complex Repair And M Plasty Text: The defect edges were debeveled with a #15 scalpel blade.  The primary defect was closed partially with a complex linear closure.  Given the location of the remaining defect, shape of the defect and the proximity to free margins an M plasty was deemed most appropriate for complete closure of the defect.  Using a sterile surgical marker, an appropriate advancement flap was drawn incorporating the defect and placing the expected incisions within the relaxed skin tension lines where possible.    The area thus outlined was incised deep to adipose tissue with a #15 scalpel blade.  The skin margins were undermined to an appropriate distance in all directions utilizing iris scissors. Complex Repair And Double M Plasty Text: The defect edges were debeveled with a #15 scalpel blade.  The primary defect was closed partially with a complex linear closure.  Given the location of the remaining defect, shape of the defect and the proximity to free margins a double M plasty was deemed most appropriate for complete closure of the defect.  Using a sterile surgical marker, an appropriate advancement flap was drawn incorporating the defect and placing the expected incisions within the relaxed skin tension lines where possible.    The area thus outlined was incised deep to adipose tissue with a #15 scalpel blade.  The skin margins were undermined to an appropriate distance in all directions utilizing iris scissors. Complex Repair And W Plasty Text: The defect edges were debeveled with a #15 scalpel blade.  The primary defect was closed partially with a complex linear closure.  Given the location of the remaining defect, shape of the defect and the proximity to free margins a W plasty was deemed most appropriate for complete closure of the defect.  Using a sterile surgical marker, an appropriate advancement flap was drawn incorporating the defect and placing the expected incisions within the relaxed skin tension lines where possible.    The area thus outlined was incised deep to adipose tissue with a #15 scalpel blade.  The skin margins were undermined to an appropriate distance in all directions utilizing iris scissors. Complex Repair And Z Plasty Text: The defect edges were debeveled with a #15 scalpel blade.  The primary defect was closed partially with a complex linear closure.  Given the location of the remaining defect, shape of the defect and the proximity to free margins a Z plasty was deemed most appropriate for complete closure of the defect.  Using a sterile surgical marker, an appropriate advancement flap was drawn incorporating the defect and placing the expected incisions within the relaxed skin tension lines where possible.    The area thus outlined was incised deep to adipose tissue with a #15 scalpel blade.  The skin margins were undermined to an appropriate distance in all directions utilizing iris scissors. Complex Repair And Dorsal Nasal Flap Text: The defect edges were debeveled with a #15 scalpel blade.  The primary defect was closed partially with a complex linear closure.  Given the location of the remaining defect, shape of the defect and the proximity to free margins a dorsal nasal flap was deemed most appropriate for complete closure of the defect.  Using a sterile surgical marker, an appropriate flap was drawn incorporating the defect and placing the expected incisions within the relaxed skin tension lines where possible.    The area thus outlined was incised deep to adipose tissue with a #15 scalpel blade.  The skin margins were undermined to an appropriate distance in all directions utilizing iris scissors. Complex Repair And Ftsg Text: The defect edges were debeveled with a #15 scalpel blade.  The primary defect was closed partially with a complex linear closure.  Given the location of the defect, shape of the defect and the proximity to free margins a full thickness skin graft was deemed most appropriate to repair the remaining defect.  The graft was trimmed to fit the size of the remaining defect.  The graft was then placed in the primary defect, oriented appropriately, and sutured into place. Complex Repair And Burow's Graft Text: The defect edges were debeveled with a #15 scalpel blade.  The primary defect was closed partially with a complex linear closure.  Given the location of the defect, shape of the defect, the proximity to free margins and the presence of a standing cone deformity a Burow's graft was deemed most appropriate to repair the remaining defect.  The graft was trimmed to fit the size of the remaining defect.  The graft was then placed in the primary defect, oriented appropriately, and sutured into place. Complex Repair And Split-Thickness Skin Graft Text: The defect edges were debeveled with a #15 scalpel blade.  The primary defect was closed partially with a complex linear closure.  Given the location of the defect, shape of the defect and the proximity to free margins a split thickness skin graft was deemed most appropriate to repair the remaining defect.  The graft was trimmed to fit the size of the remaining defect.  The graft was then placed in the primary defect, oriented appropriately, and sutured into place. Complex Repair And Epidermal Autograft Text: The defect edges were debeveled with a #15 scalpel blade.  The primary defect was closed partially with a complex linear closure.  Given the location of the defect, shape of the defect and the proximity to free margins an epidermal autograft was deemed most appropriate to repair the remaining defect.  The graft was trimmed to fit the size of the remaining defect.  The graft was then placed in the primary defect, oriented appropriately, and sutured into place. Complex Repair And Dermal Autograft Text: The defect edges were debeveled with a #15 scalpel blade.  The primary defect was closed partially with a complex linear closure.  Given the location of the defect, shape of the defect and the proximity to free margins an dermal autograft was deemed most appropriate to repair the remaining defect.  The graft was trimmed to fit the size of the remaining defect.  The graft was then placed in the primary defect, oriented appropriately, and sutured into place. Complex Repair And Tissue Cultured Epidermal Autograft Text: The defect edges were debeveled with a #15 scalpel blade.  The primary defect was closed partially with a complex linear closure.  Given the location of the defect, shape of the defect and the proximity to free margins an tissue cultured epidermal autograft was deemed most appropriate to repair the remaining defect.  The graft was trimmed to fit the size of the remaining defect.  The graft was then placed in the primary defect, oriented appropriately, and sutured into place. Complex Repair And Xenograft Text: The defect edges were debeveled with a #15 scalpel blade.  The primary defect was closed partially with a complex linear closure.  Given the location of the defect, shape of the defect and the proximity to free margins a xenograft was deemed most appropriate to repair the remaining defect.  The graft was trimmed to fit the size of the remaining defect.  The graft was then placed in the primary defect, oriented appropriately, and sutured into place. Complex Repair And Skin Substitute Graft Text: The defect edges were debeveled with a #15 scalpel blade.  The primary defect was closed partially with a complex linear closure.  Given the location of the remaining defect, shape of the defect and the proximity to free margins a skin substitute graft was deemed most appropriate to repair the remaining defect.  The graft was trimmed to fit the size of the remaining defect.  The graft was then placed in the primary defect, oriented appropriately, and sutured into place. Path Notes (To The Dermatopathologist): Please check margins. Consent was obtained from the patient. The risks and benefits to therapy were discussed in detail. Specifically, the risks of infection, scarring, bleeding, prolonged wound healing, incomplete removal, allergy to anesthesia, nerve injury and recurrence were addressed. Prior to the procedure, the treatment site was clearly identified and confirmed by the patient. All components of Universal Protocol/PAUSE Rule completed. Post-Care Instructions: I reviewed with the patient in detail post-care instructions. Patient is not to engage in any heavy lifting, exercise, or swimming for the next 14 days. Should the patient develop any fevers, chills, bleeding, severe pain patient will contact the office immediately. Home Suture Removal Text: Patient was provided a home suture removal kit and will remove their sutures at home.  If they have any questions or difficulties they will call the office. Where Do You Want The Question To Include Opioid Counseling Located?: Case Summary Tab Information: Selecting Yes will display possible errors in your note based on the variables you have selected. This validation is only offered as a suggestion for you. PLEASE NOTE THAT THE VALIDATION TEXT WILL BE REMOVED WHEN YOU FINALIZE YOUR NOTE. IF YOU WANT TO FAX A PRELIMINARY NOTE YOU WILL NEED TO TOGGLE THIS TO 'NO' IF YOU DO NOT WANT IT IN YOUR FAXED NOTE.

## 2023-11-17 NOTE — ED PEDIATRIC NURSE NOTE - NS ED NURSE REPORT GIVEN TO FT
From: Rachel Aguillon  To: Alycia Quiles  Sent: 11/16/2023 4:37 PM CST  Subject: return to work? Can I get a return to work note for 11/20/2023? Thank you!
3D RN

## 2023-12-12 ENCOUNTER — APPOINTMENT (OUTPATIENT)
Dept: MEDICATION MANAGEMENT | Facility: CLINIC | Age: 23
End: 2023-12-12

## 2023-12-12 ENCOUNTER — OUTPATIENT (OUTPATIENT)
Dept: OUTPATIENT SERVICES | Facility: HOSPITAL | Age: 23
LOS: 1 days | End: 2023-12-12
Payer: MEDICARE

## 2023-12-12 DIAGNOSIS — Z79.01 LONG TERM (CURRENT) USE OF ANTICOAGULANTS: ICD-10-CM

## 2023-12-12 DIAGNOSIS — Z95.2 PRESENCE OF PROSTHETIC HEART VALVE: Chronic | ICD-10-CM

## 2023-12-12 DIAGNOSIS — Z51.81 ENCOUNTER FOR THERAPEUTIC DRUG LEVEL MONITORING: ICD-10-CM

## 2023-12-12 LAB
INR PPP: 2.8 RATIO
POCT-PROTHROMBIN TIME: 33.1 SECS
QUALITY CONTROL: YES

## 2023-12-12 PROCEDURE — 99211 OFF/OP EST MAY X REQ PHY/QHP: CPT

## 2023-12-12 PROCEDURE — 85610 PROTHROMBIN TIME: CPT

## 2023-12-12 PROCEDURE — 36416 COLLJ CAPILLARY BLOOD SPEC: CPT

## 2023-12-13 DIAGNOSIS — Z79.01 LONG TERM (CURRENT) USE OF ANTICOAGULANTS: ICD-10-CM

## 2023-12-13 DIAGNOSIS — Z51.81 ENCOUNTER FOR THERAPEUTIC DRUG LEVEL MONITORING: ICD-10-CM

## 2024-01-09 ENCOUNTER — OUTPATIENT (OUTPATIENT)
Dept: OUTPATIENT SERVICES | Facility: HOSPITAL | Age: 24
LOS: 1 days | End: 2024-01-09
Payer: MEDICARE

## 2024-01-09 ENCOUNTER — APPOINTMENT (OUTPATIENT)
Dept: MEDICATION MANAGEMENT | Facility: CLINIC | Age: 24
End: 2024-01-09

## 2024-01-09 DIAGNOSIS — Z51.81 ENCOUNTER FOR THERAPEUTIC DRUG LEVEL MONITORING: ICD-10-CM

## 2024-01-09 DIAGNOSIS — Z95.2 PRESENCE OF PROSTHETIC HEART VALVE: Chronic | ICD-10-CM

## 2024-01-09 DIAGNOSIS — Z79.01 LONG TERM (CURRENT) USE OF ANTICOAGULANTS: ICD-10-CM

## 2024-01-09 DIAGNOSIS — Q21.2 ATRIOVENTRICULAR SEPTAL DEFECT: Chronic | ICD-10-CM

## 2024-01-09 LAB
INR PPP: 3.4 RATIO
POCT-PROTHROMBIN TIME: 40.3 SECS
QUALITY CONTROL: YES

## 2024-01-09 PROCEDURE — 36416 COLLJ CAPILLARY BLOOD SPEC: CPT

## 2024-01-09 PROCEDURE — 85610 PROTHROMBIN TIME: CPT

## 2024-01-09 PROCEDURE — 99211 OFF/OP EST MAY X REQ PHY/QHP: CPT

## 2024-01-10 DIAGNOSIS — Z79.01 LONG TERM (CURRENT) USE OF ANTICOAGULANTS: ICD-10-CM

## 2024-01-10 DIAGNOSIS — Z51.81 ENCOUNTER FOR THERAPEUTIC DRUG LEVEL MONITORING: ICD-10-CM

## 2024-01-16 ENCOUNTER — APPOINTMENT (OUTPATIENT)
Dept: SURGERY | Facility: CLINIC | Age: 24
End: 2024-01-16
Payer: MEDICARE

## 2024-01-16 VITALS — WEIGHT: 210 LBS | BODY MASS INDEX: 37.21 KG/M2 | HEIGHT: 63 IN

## 2024-01-16 DIAGNOSIS — E66.09 OTHER OBESITY DUE TO EXCESS CALORIES: ICD-10-CM

## 2024-01-16 PROCEDURE — 99203 OFFICE O/P NEW LOW 30 MIN: CPT

## 2024-01-16 NOTE — ASSESSMENT
[FreeTextEntry1] : Howie is a pleasant 23-year-old gentleman presenting to the office with his parents with a past medical history significant for Down syndrome, 5 open heart surgeries (the last one a mechanical mitral valve replacement in July 2020) on Coumadin, hypercholesterolemia, hypothyroidism and alopecia totalis now presenting to the office with a large bulge in the mid upper abdomen suspicious for hernia.  Physical examination demonstrates a large tender bulge the size of a potato in the mid upper abdomen which is firm and none reducible consistent with a large symptomatic protruding incarcerated ventral (and possibly incisional) hernia warranting surgical repair.  There is no evidence of strangulation, and the patient denies any symptoms of obstruction.  This hernia is complicated by a moderate to large rectus diastasis likely related to his excess abdominal weight.  His current BMI is 37.  I explained the pros and cons of surgery, as well as all risks, benefits, indications and alternatives of the procedure and his parents understood and agreed.  Howie was scheduled for the repair of his incarcerated ventral hernia with mesh on Wednesday, February 21, 2024 under LOCAL with IV SEDATION at [facility] with presurgical testing preceding this date. He was encouraged to avoid heavy lifting and strenuous activity in the interim, of course.  His parents will hold his Coumadin 2 days prior to his surgery and they may resume it immediately following his procedure.  If bridging therapy is necessary, it will be arranged by his cardiologist and the [facility] here at [facility].

## 2024-01-16 NOTE — PHYSICAL EXAM
[JVD] : no jugular venous distention  [Normal Breath Sounds] : Normal breath sounds [No Rash or Lesion] : No rash or lesion [Alert] : alert [Calm] : calm [de-identified] : Overweight [de-identified] : Normal [de-identified] : Moderately protuberant abdomen, moderate rectus diastasis [de-identified] : Large incarcerated mid abdominal ventral hernia [de-identified] : Alopecia totalis

## 2024-01-16 NOTE — CONSULT LETTER
[Dear  ___] : Dear  [unfilled], [Courtesy Letter:] : I had the pleasure of seeing your patient, [unfilled], in my office today. [Please see my note below.] : Please see my note below. [Consult Closing:] : Thank you very much for allowing me to participate in the care of this patient.  If you have any questions, please do not hesitate to contact me. [FreeTextEntry3] : Respectfully,  Howie Duran M.D., FACS [DrJosé Manuel  ___] : Dr. SIMON

## 2024-02-06 ENCOUNTER — OUTPATIENT (OUTPATIENT)
Dept: OUTPATIENT SERVICES | Facility: HOSPITAL | Age: 24
LOS: 1 days | End: 2024-02-06
Payer: MEDICARE

## 2024-02-06 ENCOUNTER — APPOINTMENT (OUTPATIENT)
Dept: MEDICATION MANAGEMENT | Facility: CLINIC | Age: 24
End: 2024-02-06

## 2024-02-06 DIAGNOSIS — Z79.01 LONG TERM (CURRENT) USE OF ANTICOAGULANTS: ICD-10-CM

## 2024-02-06 DIAGNOSIS — Q21.2 ATRIOVENTRICULAR SEPTAL DEFECT: Chronic | ICD-10-CM

## 2024-02-06 DIAGNOSIS — Z51.81 ENCOUNTER FOR THERAPEUTIC DRUG LEVEL MONITORING: ICD-10-CM

## 2024-02-06 DIAGNOSIS — Z95.2 PRESENCE OF PROSTHETIC HEART VALVE: Chronic | ICD-10-CM

## 2024-02-06 LAB
INR PPP: 3 RATIO
POCT-PROTHROMBIN TIME: 36.6 SECS
QUALITY CONTROL: YES

## 2024-02-06 PROCEDURE — 85610 PROTHROMBIN TIME: CPT

## 2024-02-06 PROCEDURE — 99211 OFF/OP EST MAY X REQ PHY/QHP: CPT

## 2024-02-06 PROCEDURE — 36416 COLLJ CAPILLARY BLOOD SPEC: CPT

## 2024-02-07 ENCOUNTER — OUTPATIENT (OUTPATIENT)
Dept: OUTPATIENT SERVICES | Facility: HOSPITAL | Age: 24
LOS: 1 days | End: 2024-02-07
Payer: MEDICARE

## 2024-02-07 VITALS
HEIGHT: 63 IN | TEMPERATURE: 96 F | HEART RATE: 84 BPM | RESPIRATION RATE: 18 BRPM | SYSTOLIC BLOOD PRESSURE: 130 MMHG | DIASTOLIC BLOOD PRESSURE: 69 MMHG | WEIGHT: 210.1 LBS | OXYGEN SATURATION: 96 %

## 2024-02-07 DIAGNOSIS — Z95.2 PRESENCE OF PROSTHETIC HEART VALVE: Chronic | ICD-10-CM

## 2024-02-07 DIAGNOSIS — Q21.2 ATRIOVENTRICULAR SEPTAL DEFECT: Chronic | ICD-10-CM

## 2024-02-07 DIAGNOSIS — Z01.818 ENCOUNTER FOR OTHER PREPROCEDURAL EXAMINATION: ICD-10-CM

## 2024-02-07 DIAGNOSIS — Z79.01 LONG TERM (CURRENT) USE OF ANTICOAGULANTS: ICD-10-CM

## 2024-02-07 DIAGNOSIS — Z51.81 ENCOUNTER FOR THERAPEUTIC DRUG LEVEL MONITORING: ICD-10-CM

## 2024-02-07 DIAGNOSIS — K43.6 OTHER AND UNSPECIFIED VENTRAL HERNIA WITH OBSTRUCTION, WITHOUT GANGRENE: ICD-10-CM

## 2024-02-07 LAB
ALBUMIN SERPL ELPH-MCNC: 3.9 G/DL — SIGNIFICANT CHANGE UP (ref 3.5–5.2)
ALP SERPL-CCNC: 122 U/L — HIGH (ref 30–115)
ALT FLD-CCNC: 20 U/L — SIGNIFICANT CHANGE UP (ref 0–41)
ANION GAP SERPL CALC-SCNC: 13 MMOL/L — SIGNIFICANT CHANGE UP (ref 7–14)
APPEARANCE UR: ABNORMAL
APTT BLD: 48.3 SEC — HIGH (ref 27–39.2)
AST SERPL-CCNC: 20 U/L — SIGNIFICANT CHANGE UP (ref 0–41)
BACTERIA # UR AUTO: NEGATIVE /HPF — SIGNIFICANT CHANGE UP
BASOPHILS # BLD AUTO: 0.06 K/UL — SIGNIFICANT CHANGE UP (ref 0–0.2)
BASOPHILS NFR BLD AUTO: 0.9 % — SIGNIFICANT CHANGE UP (ref 0–1)
BILIRUB SERPL-MCNC: 0.4 MG/DL — SIGNIFICANT CHANGE UP (ref 0.2–1.2)
BILIRUB UR-MCNC: NEGATIVE — SIGNIFICANT CHANGE UP
BUN SERPL-MCNC: 15 MG/DL — SIGNIFICANT CHANGE UP (ref 10–20)
CALCIUM SERPL-MCNC: 8.7 MG/DL — SIGNIFICANT CHANGE UP (ref 8.4–10.5)
CAST: 0 /LPF — SIGNIFICANT CHANGE UP (ref 0–4)
CHLORIDE SERPL-SCNC: 99 MMOL/L — SIGNIFICANT CHANGE UP (ref 98–110)
CO2 SERPL-SCNC: 32 MMOL/L — SIGNIFICANT CHANGE UP (ref 17–32)
COLOR SPEC: YELLOW — SIGNIFICANT CHANGE UP
CREAT SERPL-MCNC: 0.9 MG/DL — SIGNIFICANT CHANGE UP (ref 0.7–1.5)
DIFF PNL FLD: NEGATIVE — SIGNIFICANT CHANGE UP
EGFR: 122 ML/MIN/1.73M2 — SIGNIFICANT CHANGE UP
EOSINOPHIL # BLD AUTO: 0.22 K/UL — SIGNIFICANT CHANGE UP (ref 0–0.7)
EOSINOPHIL NFR BLD AUTO: 3.2 % — SIGNIFICANT CHANGE UP (ref 0–8)
GLUCOSE SERPL-MCNC: 77 MG/DL — SIGNIFICANT CHANGE UP (ref 70–99)
GLUCOSE UR QL: NEGATIVE MG/DL — SIGNIFICANT CHANGE UP
HCT VFR BLD CALC: 45.5 % — SIGNIFICANT CHANGE UP (ref 42–52)
HGB BLD-MCNC: 15.1 G/DL — SIGNIFICANT CHANGE UP (ref 14–18)
IMM GRANULOCYTES NFR BLD AUTO: 0.4 % — HIGH (ref 0.1–0.3)
INR BLD: 2.68 RATIO — HIGH (ref 0.65–1.3)
KETONES UR-MCNC: NEGATIVE MG/DL — SIGNIFICANT CHANGE UP
LEUKOCYTE ESTERASE UR-ACNC: ABNORMAL
LYMPHOCYTES # BLD AUTO: 0.72 K/UL — LOW (ref 1.2–3.4)
LYMPHOCYTES # BLD AUTO: 10.4 % — LOW (ref 20.5–51.1)
MCHC RBC-ENTMCNC: 33.2 G/DL — SIGNIFICANT CHANGE UP (ref 32–37)
MCHC RBC-ENTMCNC: 34.1 PG — HIGH (ref 27–31)
MCV RBC AUTO: 102.7 FL — HIGH (ref 80–94)
MONOCYTES # BLD AUTO: 0.56 K/UL — SIGNIFICANT CHANGE UP (ref 0.1–0.6)
MONOCYTES NFR BLD AUTO: 8.1 % — SIGNIFICANT CHANGE UP (ref 1.7–9.3)
NEUTROPHILS # BLD AUTO: 5.35 K/UL — SIGNIFICANT CHANGE UP (ref 1.4–6.5)
NEUTROPHILS NFR BLD AUTO: 77 % — HIGH (ref 42.2–75.2)
NITRITE UR-MCNC: NEGATIVE — SIGNIFICANT CHANGE UP
NRBC # BLD: 0 /100 WBCS — SIGNIFICANT CHANGE UP (ref 0–0)
PH UR: 6.5 — SIGNIFICANT CHANGE UP (ref 5–8)
PLATELET # BLD AUTO: 260 K/UL — SIGNIFICANT CHANGE UP (ref 130–400)
PMV BLD: 10.7 FL — HIGH (ref 7.4–10.4)
POTASSIUM SERPL-MCNC: 4.2 MMOL/L — SIGNIFICANT CHANGE UP (ref 3.5–5)
POTASSIUM SERPL-SCNC: 4.2 MMOL/L — SIGNIFICANT CHANGE UP (ref 3.5–5)
PROT SERPL-MCNC: 6.8 G/DL — SIGNIFICANT CHANGE UP (ref 6–8)
PROT UR-MCNC: 30 MG/DL
PROTHROM AB SERPL-ACNC: 30.8 SEC — HIGH (ref 9.95–12.87)
RBC # BLD: 4.43 M/UL — LOW (ref 4.7–6.1)
RBC # FLD: 14 % — SIGNIFICANT CHANGE UP (ref 11.5–14.5)
RBC CASTS # UR COMP ASSIST: 0 /HPF — SIGNIFICANT CHANGE UP (ref 0–4)
SODIUM SERPL-SCNC: 144 MMOL/L — SIGNIFICANT CHANGE UP (ref 135–146)
SP GR SPEC: 1.02 — SIGNIFICANT CHANGE UP (ref 1–1.03)
SQUAMOUS # UR AUTO: 0 /HPF — SIGNIFICANT CHANGE UP (ref 0–5)
UROBILINOGEN FLD QL: 0.2 MG/DL — SIGNIFICANT CHANGE UP (ref 0.2–1)
WBC # BLD: 6.94 K/UL — SIGNIFICANT CHANGE UP (ref 4.8–10.8)
WBC # FLD AUTO: 6.94 K/UL — SIGNIFICANT CHANGE UP (ref 4.8–10.8)
WBC UR QL: 2 /HPF — SIGNIFICANT CHANGE UP (ref 0–5)

## 2024-02-07 PROCEDURE — 93005 ELECTROCARDIOGRAM TRACING: CPT

## 2024-02-07 PROCEDURE — 36415 COLL VENOUS BLD VENIPUNCTURE: CPT

## 2024-02-07 PROCEDURE — 85025 COMPLETE CBC W/AUTO DIFF WBC: CPT

## 2024-02-07 PROCEDURE — 80053 COMPREHEN METABOLIC PANEL: CPT

## 2024-02-07 PROCEDURE — 85730 THROMBOPLASTIN TIME PARTIAL: CPT

## 2024-02-07 PROCEDURE — 85610 PROTHROMBIN TIME: CPT

## 2024-02-07 PROCEDURE — 93010 ELECTROCARDIOGRAM REPORT: CPT

## 2024-02-07 PROCEDURE — 99214 OFFICE O/P EST MOD 30 MIN: CPT | Mod: 25

## 2024-02-07 PROCEDURE — 81001 URINALYSIS AUTO W/SCOPE: CPT

## 2024-02-07 RX ORDER — WARFARIN SODIUM 2.5 MG/1
1 TABLET ORAL
Qty: 0 | Refills: 0 | DISCHARGE

## 2024-02-07 RX ORDER — LEVOTHYROXINE SODIUM 125 MCG
0 TABLET ORAL
Qty: 0 | Refills: 0 | DISCHARGE

## 2024-02-07 RX ORDER — SERTRALINE 25 MG/1
2 TABLET, FILM COATED ORAL
Qty: 0 | Refills: 0 | DISCHARGE

## 2024-02-07 NOTE — H&P PST ADULT - HISTORY OF PRESENT ILLNESS
pt noted with ventral hernia x several months  now for planned procedure     PATIENT CURRENTLY DENIES CHEST PAIN  SHORTNESS OF BREATH  PALPITATIONS,  DYSURIA, OR UPPER RESPIRATORY INFECTION IN PAST 2 WEEKS  denies travel outside the [place] in the past 30 days  Patient denies any signs or symptoms of COVID 19 and denies contact with known positive individuals.  They have an appointment for repeat COVID testing pre-procedure and acknowledge its time and place.  They were instructed to quarantine pre-procedure, practice exposure control measures, continue to self-monitor and report any concerns to their proceduralist.  pt advised self quarantine till day of procedure    Anesthesia Alert  NO--Difficult Airway  NO--History of neck surgery or radiation  NO--Limited ROM of neck  NO--History of Malignant hyperthermia  NO--No personal or family history of Pseudocholinesterase deficiency.  NO--Prior Anesthesia Complication  NO--Latex Allergy  NO--Loose teeth  NO--History of Rheumatoid Arthritis  Bleeding risk coumadin   + MERLIN  NO--Other_____    PT DENIES ANY RASHES, ABRASION, OR OPEN WOUNDS OR CUTS    AS PER THE PT, THIS IS HIS/HER COMPLETE MEDICAL AND SURGICAL HX, INCLUDING MEDICATIONS PRESCRIBED AND OVER THE COUNTER    Patient verbalized understanding of instructions and was given the opportunity to ask questions and have them answered.    pt denies any suicidal ideation or thoughts, pt states not a threat to self or others    Other or unspecified ventral hernia with obstruction without gangrene    Encounter for other preprocedural examination    FH: hypertension    Family history of mitral valve prolapse    Hypothyroid    Psoriasis    Alopecia    Mitral valve replaced    Down syndrome    H/O mitral valve replacement    Endocardial cushion defect    [id number]    SysAdmin_VstLnk

## 2024-02-07 NOTE — H&P PST ADULT - REASON FOR ADMISSION
Patient is a 24  year old  male presenting to PAST in preparation for  INCARCERATED VENTRAL HERNIA REPAIR WITH MESH   on 2/21/24  under [facility] anesthesia by Dr. linares

## 2024-02-08 DIAGNOSIS — K43.6 OTHER AND UNSPECIFIED VENTRAL HERNIA WITH OBSTRUCTION, WITHOUT GANGRENE: ICD-10-CM

## 2024-02-08 DIAGNOSIS — Z01.818 ENCOUNTER FOR OTHER PREPROCEDURAL EXAMINATION: ICD-10-CM

## 2024-02-20 ENCOUNTER — OUTPATIENT (OUTPATIENT)
Dept: OUTPATIENT SERVICES | Facility: HOSPITAL | Age: 24
LOS: 1 days | End: 2024-02-20
Payer: MEDICARE

## 2024-02-20 ENCOUNTER — APPOINTMENT (OUTPATIENT)
Dept: MEDICATION MANAGEMENT | Facility: CLINIC | Age: 24
End: 2024-02-20

## 2024-02-20 DIAGNOSIS — Z79.01 LONG TERM (CURRENT) USE OF ANTICOAGULANTS: ICD-10-CM

## 2024-02-20 DIAGNOSIS — Z95.2 PRESENCE OF PROSTHETIC HEART VALVE: Chronic | ICD-10-CM

## 2024-02-20 DIAGNOSIS — Z51.81 ENCOUNTER FOR THERAPEUTIC DRUG LEVEL MONITORING: ICD-10-CM

## 2024-02-20 DIAGNOSIS — Q21.2 ATRIOVENTRICULAR SEPTAL DEFECT: Chronic | ICD-10-CM

## 2024-02-20 LAB
INR PPP: 2.8 RATIO
POCT-PROTHROMBIN TIME: 33.3 SECS
QUALITY CONTROL: YES

## 2024-02-20 PROCEDURE — 85610 PROTHROMBIN TIME: CPT

## 2024-02-20 PROCEDURE — 36416 COLLJ CAPILLARY BLOOD SPEC: CPT

## 2024-02-20 PROCEDURE — 99211 OFF/OP EST MAY X REQ PHY/QHP: CPT

## 2024-02-20 NOTE — ASU PATIENT PROFILE, ADULT - FALL HARM RISK - UNIVERSAL INTERVENTIONS
Bed in lowest position, wheels locked, appropriate side rails in place/Call bell, personal items and telephone in reach/Instruct patient to call for assistance before getting out of bed or chair/Non-slip footwear when patient is out of bed/[employer] to call system/Physically safe environment - no spills, clutter or unnecessary equipment/Purposeful Proactive Rounding/Room/bathroom lighting operational, light cord in reach

## 2024-02-20 NOTE — ASU PATIENT PROFILE, ADULT - FALL HARM RISK - PATIENT NEEDS ASSISTANCE
No assistance needed
Patient requests all Lab, Cardiology, and Radiology Results on their Discharge Instructions

## 2024-02-21 ENCOUNTER — APPOINTMENT (OUTPATIENT)
Dept: SURGERY | Facility: AMBULATORY SURGERY CENTER | Age: 24
End: 2024-02-21
Payer: MEDICARE

## 2024-02-21 ENCOUNTER — RESULT REVIEW (OUTPATIENT)
Age: 24
End: 2024-02-21

## 2024-02-21 ENCOUNTER — TRANSCRIPTION ENCOUNTER (OUTPATIENT)
Age: 24
End: 2024-02-21

## 2024-02-21 ENCOUNTER — OUTPATIENT (OUTPATIENT)
Dept: OUTPATIENT SERVICES | Facility: HOSPITAL | Age: 24
LOS: 1 days | Discharge: HOME | End: 2024-02-21
Payer: MEDICARE

## 2024-02-21 VITALS
OXYGEN SATURATION: 91 % | HEART RATE: 98 BPM | RESPIRATION RATE: 22 BRPM | SYSTOLIC BLOOD PRESSURE: 105 MMHG | DIASTOLIC BLOOD PRESSURE: 52 MMHG

## 2024-02-21 VITALS
DIASTOLIC BLOOD PRESSURE: 62 MMHG | HEIGHT: 63 IN | SYSTOLIC BLOOD PRESSURE: 153 MMHG | RESPIRATION RATE: 18 BRPM | HEART RATE: 85 BPM | OXYGEN SATURATION: 92 % | WEIGHT: 210.1 LBS

## 2024-02-21 DIAGNOSIS — Q21.2 ATRIOVENTRICULAR SEPTAL DEFECT: Chronic | ICD-10-CM

## 2024-02-21 DIAGNOSIS — Z79.01 LONG TERM (CURRENT) USE OF ANTICOAGULANTS: ICD-10-CM

## 2024-02-21 DIAGNOSIS — Z95.2 PRESENCE OF PROSTHETIC HEART VALVE: Chronic | ICD-10-CM

## 2024-02-21 DIAGNOSIS — Z51.81 ENCOUNTER FOR THERAPEUTIC DRUG LEVEL MONITORING: ICD-10-CM

## 2024-02-21 DIAGNOSIS — K43.6 OTHER AND UNSPECIFIED VENTRAL HERNIA WITH OBSTRUCTION, WITHOUT GANGRENE: ICD-10-CM

## 2024-02-21 PROCEDURE — 94640 AIRWAY INHALATION TREATMENT: CPT

## 2024-02-21 PROCEDURE — 49594 RPR AA HRN 1ST 3-10 NCR/STRN: CPT

## 2024-02-21 PROCEDURE — 88302 TISSUE EXAM BY PATHOLOGIST: CPT | Mod: 26

## 2024-02-21 PROCEDURE — 71045 X-RAY EXAM CHEST 1 VIEW: CPT | Mod: 26

## 2024-02-21 PROCEDURE — 71045 X-RAY EXAM CHEST 1 VIEW: CPT

## 2024-02-21 PROCEDURE — C1781: CPT

## 2024-02-21 PROCEDURE — 88302 TISSUE EXAM BY PATHOLOGIST: CPT

## 2024-02-21 RX ORDER — FUROSEMIDE 40 MG
20 TABLET ORAL ONCE
Refills: 0 | Status: COMPLETED | OUTPATIENT
Start: 2024-02-21 | End: 2024-02-21

## 2024-02-21 RX ORDER — WARFARIN SODIUM 2.5 MG/1
1 TABLET ORAL
Refills: 0 | DISCHARGE

## 2024-02-21 RX ORDER — LEVOTHYROXINE SODIUM 125 MCG
1 TABLET ORAL
Refills: 0 | DISCHARGE

## 2024-02-21 RX ORDER — ACETAMINOPHEN 500 MG
1000 TABLET ORAL ONCE
Refills: 0 | Status: COMPLETED | OUTPATIENT
Start: 2024-02-21 | End: 2024-02-21

## 2024-02-21 RX ORDER — MIDAZOLAM HYDROCHLORIDE 1 MG/ML
40 INJECTION, SOLUTION INTRAMUSCULAR; INTRAVENOUS ONCE
Refills: 0 | Status: DISCONTINUED | OUTPATIENT
Start: 2024-02-21 | End: 2024-02-21

## 2024-02-21 RX ORDER — ALBUTEROL 90 UG/1
2.5 AEROSOL, METERED ORAL ONCE
Refills: 0 | Status: COMPLETED | OUTPATIENT
Start: 2024-02-21 | End: 2024-02-21

## 2024-02-21 RX ORDER — SERTRALINE 25 MG/1
1 TABLET, FILM COATED ORAL
Refills: 0 | DISCHARGE

## 2024-02-21 RX ORDER — KETAMINE HYDROCHLORIDE 100 MG/ML
350 INJECTION INTRAMUSCULAR; INTRAVENOUS ONCE
Refills: 0 | Status: DISCONTINUED | OUTPATIENT
Start: 2024-02-21 | End: 2024-02-21

## 2024-02-21 RX ADMIN — Medication 1000 MILLIGRAM(S): at 15:53

## 2024-02-21 RX ADMIN — MIDAZOLAM HYDROCHLORIDE 40 MILLIGRAM(S): 1 INJECTION, SOLUTION INTRAMUSCULAR; INTRAVENOUS at 07:46

## 2024-02-21 RX ADMIN — Medication 20 MILLIGRAM(S): at 15:53

## 2024-02-21 RX ADMIN — ALBUTEROL 2.5 MILLIGRAM(S): 90 AEROSOL, METERED ORAL at 15:45

## 2024-02-21 NOTE — BRIEF OPERATIVE NOTE - NSICDXBRIEFPROCEDURE_GEN_ALL_CORE_FT
PROCEDURES:  Repair of anterior abdominal hernia(s) (ie, epigastric, incisional, ventral, umbilical, Spigelian), 21-Feb-2024 07:23:34  Howie Duran

## 2024-02-21 NOTE — CHART NOTE - NSCHARTNOTEFT_GEN_A_CORE
Patient's status unchanged. Intermittently falling asleep with obstructive breathing while sleeping. Improved O2 sat while albuterol nebulizer being administered. Patient previously was on furosemide after prior surgeries, not currently on it. CXR showing e/o mild congestion, although improved from 11/2020 CXR. Will administer furosemide 20mg and monitor. If no improvement in clinical status after these interventions, will transfer to main OR PACU.
Pt examined in main PACU, observed over about 1 hour. Since initial evaluation, he has become much more awake and interactive. He drank juice, voided multiple times post-op, and is denying pain. He's saturating in the mid-80s on room air, and has a productive cough. Multiple discussions had with parents -- he is acting and saturating at baseline and they believe he would be more comfortable at home given his PMH. They are very comfortable watching him and taking his pulse ox frequently, and have a low threshold to bring him back in. Care discussed with team members and attending.
Called to bedside to evaluate patient in anticipation for discharge. Per family, patient's breathing and behavior at baseline. He is arousable, able to follow commands, appropriate. However, when pulse oximetry placed back on patient, SpO2 60-70s on RA. Other VSS. Per patient's parents, he is normally 90% when awake at home. When asked to take a deep breath and cough, he is able to transiently bring his SpO2 to 80s. Blow by O2 placed back on patient. Patient unable to tolerate CPAP (h/o of MERLIN) or IS. CXR performed without obvious source of acute hypoxemia and grossly unchanged from prior CXR (my read, pending official read by radiologist). Discussed with Dr. Duran. Will continue to observe patient and recover him in PACU. Family aware and agreeable to plan.
PACU ANESTHESIA ADMISSION NOTE      Procedure: Repair of anterior abdominal hernia(s) (ie, epigastric, incisional, ventral, umbilical, Spigelian),      Post op diagnosis:  Incarcerated ventral hernia        ____  Intubated  TV:______       Rate: ______      FiO2: ______    _x___  Patent Airway    _x___  Full return of protective reflexes    __x__  Full recovery from anesthesia / back to baseline     Vitals:   T:   36        R: 21                 BP:  144/74                Sat:   91                P: 91      Mental Status:  ____ Awake   _____ Alert   __x___ Drowsy   _____ Sedated    Nausea/Vomiting:  __x__ NO  ______Yes,   See Post - Op Orders          Pain Scale (0-10):  __x___    Treatment: ____ None    ____ See Post - Op/PCA Orders    Post - Operative Fluids:   ____ Oral   _x___ See Post - Op Orders    Plan: Discharge:  x ____Home       _____Floor     _____Critical Care    _____  Other:_________________    Comments:

## 2024-02-21 NOTE — ASU DISCHARGE PLAN (ADULT/PEDIATRIC) - NS MD DC FALL RISK RISK
For information on Fall & Injury Prevention, visit: [email or web address] OR  [email or web address] OR  https://www.cdc.gov/steadi/patient.html

## 2024-02-21 NOTE — ASU DISCHARGE PLAN (ADULT/PEDIATRIC) - COMMENTS
- You will see [facility] Physician Assistant, Soco Oropeza, at your postoperative visit noted above.

## 2024-02-21 NOTE — ASU DISCHARGE PLAN (ADULT/PEDIATRIC) - ASU DC SPECIAL INSTRUCTIONSFT
Diet    Eat light on the day of surgery. Nausea and vomiting can occur after anesthesia,   but usually resolve within 24 hours.  Resume normal diet the following day.      Activity    Rest!  No heavy lifting or strenuous activity.    Medications    Ibuprofen (Advil, Motrin), Naproxen (Aleve) and/or Extra-Strength Tylenol for pain.  Tramadol for severe pain only.  Your prescription was sent electronically to your pharmacy.  Remember, Tramadol is a strong narcotic pain reliever which can cause drowsiness, upset stomach and constipation.  It should always be taken with food.  You can use stool softener (Mineral Oil) or laxative (MiraLax or Dulcolax) if constipated.  An antibiotic is given during surgery.  No antibiotic needed at home.  Resume all previous medications.  Resume blood thinners the day after surgery unless told otherwise.    Wound Care    Leave surgical dressing in place.  May shower (dressing is waterproof.)  No pool, ocean, lake, hot-tub or bath for 3 weeks. If you were given an abdominal binder, please wear it as much as possible (day & night) for 2 weeks, but you must remove it for showers.  Ice packs to the area intermittently for several days help with pain and swelling.  Bruising (“black and blue”) is common.  Treatment is…Ice & Rest.       - You will see [facility] Physician Assistant, Soco Oropeza, at your postoperative visit noted below. Diet    Eat light on the day of surgery. Nausea and vomiting can occur after anesthesia,   but usually resolve within 24 hours.  Resume normal diet the following day.      Activity    Rest!  No heavy lifting or strenuous activity.    Medications    Extra-Strength Tylenol for pain.  Tramadol for severe pain only.  Your prescription was sent electronically to your pharmacy.  Remember, Tramadol is a strong narcotic pain reliever which can cause drowsiness, upset stomach and constipation.  It should always be taken with food.  You can use stool softener (Mineral Oil) or laxative (MiraLax or Dulcolax) if constipated.  An antibiotic is given during surgery.  No antibiotic needed at home.  Resume all previous medications.  Resume blood thinners the day after surgery unless told otherwise.    Wound Care    Leave surgical dressing in place.  May shower (dressing is waterproof.)  No pool, ocean, lake, hot-tub or bath for 3 weeks. If you were given an abdominal binder, please wear it as much as possible (day & night) for 2 weeks, but you must remove it for showers.  Ice packs to the area intermittently for several days help with pain and swelling.  Bruising (“black and blue”) is common.  Treatment is…Ice & Rest.       - You will see [facility] Physician Assistant, Soco Oropeza, at your postoperative visit noted below.

## 2024-02-21 NOTE — ASU PREOP CHECKLIST - SURGICAL CONSENT
Hospitalist Progress Note    NAME: Charlie Louie   :  1941   MRN:  [id number]       Tony Pang, Phone Number [phone]     Cassandra Burkitt, Cassandra Burkitt, MD done

## 2024-02-21 NOTE — ASU DISCHARGE PLAN (ADULT/PEDIATRIC) - CARE PROVIDER_API CALL
Howie Duran  Surgery  [address] [place] [id number]  Phone: [phone]  Fax: [phone]  Scheduled Appointment: 02/28/2024 09:00 AM

## 2024-02-22 LAB — SURGICAL PATHOLOGY STUDY: SIGNIFICANT CHANGE UP

## 2024-02-23 DIAGNOSIS — K43.6 OTHER AND UNSPECIFIED VENTRAL HERNIA WITH OBSTRUCTION, WITHOUT GANGRENE: ICD-10-CM

## 2024-02-23 DIAGNOSIS — E03.9 HYPOTHYROIDISM, UNSPECIFIED: ICD-10-CM

## 2024-02-23 DIAGNOSIS — G47.33 OBSTRUCTIVE SLEEP APNEA (ADULT) (PEDIATRIC): ICD-10-CM

## 2024-02-23 DIAGNOSIS — Q90.9 DOWN SYNDROME, UNSPECIFIED: ICD-10-CM

## 2024-02-28 ENCOUNTER — APPOINTMENT (OUTPATIENT)
Dept: SURGERY | Facility: CLINIC | Age: 24
End: 2024-02-28
Payer: MEDICARE

## 2024-02-28 DIAGNOSIS — K43.6 OTHER AND UNSPECIFIED VENTRAL HERNIA WITH OBSTRUCTION, W/OUT GANGRENE: ICD-10-CM

## 2024-02-28 PROCEDURE — 99213 OFFICE O/P EST LOW 20 MIN: CPT

## 2024-02-28 RX ORDER — TRAMADOL HYDROCHLORIDE 50 MG/1
50 TABLET, COATED ORAL
Qty: 20 | Refills: 0 | Status: COMPLETED | COMMUNITY
Start: 2024-02-21 | End: 2024-02-28

## 2024-02-28 NOTE — ASSESSMENT
[FreeTextEntry1] : MICHAEL LERMAN underwent an incarcerated ventral hernia repair with mesh with Dr. Duran on 2/21/24  under local IV sedation without any problems or complications. His wound is clean, dry and intact. There is no evidence of erythema, seroma formation or infection. He is tolerating a diet and having normal bowel movements. He denies any significant postoperative pain or discomfort at this time.   Pedro and his parents were counseled and reassured. PEDRO was discharged from the office with no specific follow up necessary, but he knows to avoid any heavy lifting or strenuous activity for the next several weeks. He  was encouraged to continue to wear his abdominal binder for the better part of the next month.

## 2024-02-28 NOTE — PHYSICAL EXAM
[JVD] : no jugular venous distention  [Respiratory Effort] : normal respiratory effort [Alert] : alert [Calm] : calm [de-identified] : normal [de-identified] : overweight [de-identified] : oderately protuberant abdomen, moderate rectus diastasis.   [de-identified] : Incision clean, dry, intact, no edema, no erythema, no drainage, surrounding bruising

## 2024-02-28 NOTE — CONSULT LETTER
[Dear  ___] : Dear  [unfilled], [Courtesy Letter:] : I had the pleasure of seeing your patient, [unfilled], in my office today. [Please see my note below.] : Please see my note below. [Consult Closing:] : Thank you very much for allowing me to participate in the care of this patient.  If you have any questions, please do not hesitate to contact me. [FreeTextEntry3] :     Chanel Oropeza PA-C, MSPAS [Sincerely,] : Sincerely, [DrJosé Manuel  ___] : Dr. SIMON

## 2024-03-08 ENCOUNTER — OUTPATIENT (OUTPATIENT)
Dept: OUTPATIENT SERVICES | Facility: HOSPITAL | Age: 24
LOS: 1 days | End: 2024-03-08
Payer: MEDICARE

## 2024-03-08 ENCOUNTER — APPOINTMENT (OUTPATIENT)
Dept: MEDICATION MANAGEMENT | Facility: CLINIC | Age: 24
End: 2024-03-08

## 2024-03-08 DIAGNOSIS — Z95.2 PRESENCE OF PROSTHETIC HEART VALVE: Chronic | ICD-10-CM

## 2024-03-08 DIAGNOSIS — Z51.81 ENCOUNTER FOR THERAPEUTIC DRUG LEVEL MONITORING: ICD-10-CM

## 2024-03-08 DIAGNOSIS — Z79.01 LONG TERM (CURRENT) USE OF ANTICOAGULANTS: ICD-10-CM

## 2024-03-08 DIAGNOSIS — Q21.2 ATRIOVENTRICULAR SEPTAL DEFECT: Chronic | ICD-10-CM

## 2024-03-08 LAB
INR PPP: 2.4 RATIO
POCT-PROTHROMBIN TIME: 29.1 SECS
QUALITY CONTROL: YES

## 2024-03-08 PROCEDURE — 36416 COLLJ CAPILLARY BLOOD SPEC: CPT

## 2024-03-08 PROCEDURE — 85610 PROTHROMBIN TIME: CPT

## 2024-03-08 PROCEDURE — 99211 OFF/OP EST MAY X REQ PHY/QHP: CPT

## 2024-03-09 DIAGNOSIS — Z79.01 LONG TERM (CURRENT) USE OF ANTICOAGULANTS: ICD-10-CM

## 2024-03-09 DIAGNOSIS — Z51.81 ENCOUNTER FOR THERAPEUTIC DRUG LEVEL MONITORING: ICD-10-CM

## 2024-04-05 ENCOUNTER — OUTPATIENT (OUTPATIENT)
Dept: OUTPATIENT SERVICES | Facility: HOSPITAL | Age: 24
LOS: 1 days | End: 2024-04-05
Payer: MEDICARE

## 2024-04-05 ENCOUNTER — APPOINTMENT (OUTPATIENT)
Dept: MEDICATION MANAGEMENT | Facility: CLINIC | Age: 24
End: 2024-04-05

## 2024-04-05 DIAGNOSIS — Q21.2 ATRIOVENTRICULAR SEPTAL DEFECT: Chronic | ICD-10-CM

## 2024-04-05 DIAGNOSIS — Z51.81 ENCOUNTER FOR THERAPEUTIC DRUG LEVEL MONITORING: ICD-10-CM

## 2024-04-05 DIAGNOSIS — Z79.01 LONG TERM (CURRENT) USE OF ANTICOAGULANTS: ICD-10-CM

## 2024-04-05 LAB
INR PPP: 3.1 RATIO
POCT-PROTHROMBIN TIME: 37.6 SECS
QUALITY CONTROL: YES

## 2024-04-05 PROCEDURE — 36416 COLLJ CAPILLARY BLOOD SPEC: CPT

## 2024-04-05 PROCEDURE — 99211 OFF/OP EST MAY X REQ PHY/QHP: CPT

## 2024-04-05 PROCEDURE — 85610 PROTHROMBIN TIME: CPT

## 2024-04-06 DIAGNOSIS — Z79.01 LONG TERM (CURRENT) USE OF ANTICOAGULANTS: ICD-10-CM

## 2024-04-06 DIAGNOSIS — Z51.81 ENCOUNTER FOR THERAPEUTIC DRUG LEVEL MONITORING: ICD-10-CM

## 2024-05-10 ENCOUNTER — APPOINTMENT (OUTPATIENT)
Dept: MEDICATION MANAGEMENT | Facility: CLINIC | Age: 24
End: 2024-05-10

## 2024-05-10 ENCOUNTER — OUTPATIENT (OUTPATIENT)
Dept: OUTPATIENT SERVICES | Facility: HOSPITAL | Age: 24
LOS: 1 days | End: 2024-05-10
Payer: MEDICARE

## 2024-05-10 DIAGNOSIS — Z79.01 LONG TERM (CURRENT) USE OF ANTICOAGULANTS: ICD-10-CM

## 2024-05-10 DIAGNOSIS — Z95.2 PRESENCE OF PROSTHETIC HEART VALVE: Chronic | ICD-10-CM

## 2024-05-10 DIAGNOSIS — Z51.81 ENCOUNTER FOR THERAPEUTIC DRUG LEVEL MONITORING: ICD-10-CM

## 2024-05-10 LAB
INR PPP: 3.2 RATIO
POCT-PROTHROMBIN TIME: 38.1 SECS
QUALITY CONTROL: YES

## 2024-05-10 PROCEDURE — 99211 OFF/OP EST MAY X REQ PHY/QHP: CPT

## 2024-05-10 PROCEDURE — 36416 COLLJ CAPILLARY BLOOD SPEC: CPT

## 2024-05-10 PROCEDURE — 85610 PROTHROMBIN TIME: CPT

## 2024-05-11 DIAGNOSIS — Z79.01 LONG TERM (CURRENT) USE OF ANTICOAGULANTS: ICD-10-CM

## 2024-05-11 DIAGNOSIS — Z51.81 ENCOUNTER FOR THERAPEUTIC DRUG LEVEL MONITORING: ICD-10-CM

## 2024-06-07 ENCOUNTER — APPOINTMENT (OUTPATIENT)
Dept: MEDICATION MANAGEMENT | Facility: CLINIC | Age: 24
End: 2024-06-07

## 2024-06-07 ENCOUNTER — OUTPATIENT (OUTPATIENT)
Dept: OUTPATIENT SERVICES | Facility: HOSPITAL | Age: 24
LOS: 1 days | End: 2024-06-07
Payer: MEDICARE

## 2024-06-07 DIAGNOSIS — Z79.01 LONG TERM (CURRENT) USE OF ANTICOAGULANTS: ICD-10-CM

## 2024-06-07 DIAGNOSIS — Z95.2 PRESENCE OF PROSTHETIC HEART VALVE: Chronic | ICD-10-CM

## 2024-06-07 DIAGNOSIS — Z51.81 ENCOUNTER FOR THERAPEUTIC DRUG LEVEL MONITORING: ICD-10-CM

## 2024-06-07 LAB
INR PPP: 3.1 RATIO
POCT-PROTHROMBIN TIME: 37.7 SECS
QUALITY CONTROL: YES

## 2024-06-07 PROCEDURE — 36416 COLLJ CAPILLARY BLOOD SPEC: CPT

## 2024-06-07 PROCEDURE — 99211 OFF/OP EST MAY X REQ PHY/QHP: CPT

## 2024-06-07 PROCEDURE — 85610 PROTHROMBIN TIME: CPT

## 2024-06-08 DIAGNOSIS — Z79.01 LONG TERM (CURRENT) USE OF ANTICOAGULANTS: ICD-10-CM

## 2024-06-08 DIAGNOSIS — Z51.81 ENCOUNTER FOR THERAPEUTIC DRUG LEVEL MONITORING: ICD-10-CM

## 2024-07-12 ENCOUNTER — OUTPATIENT (OUTPATIENT)
Dept: OUTPATIENT SERVICES | Facility: HOSPITAL | Age: 24
LOS: 1 days | End: 2024-07-12
Payer: MEDICARE

## 2024-07-12 ENCOUNTER — APPOINTMENT (OUTPATIENT)
Dept: MEDICATION MANAGEMENT | Facility: CLINIC | Age: 24
End: 2024-07-12

## 2024-07-12 DIAGNOSIS — Z95.2 PRESENCE OF PROSTHETIC HEART VALVE: Chronic | ICD-10-CM

## 2024-07-12 DIAGNOSIS — Z95.2 PRESENCE OF PROSTHETIC HEART VALVE: ICD-10-CM

## 2024-07-12 DIAGNOSIS — Z79.01 LONG TERM (CURRENT) USE OF ANTICOAGULANTS: ICD-10-CM

## 2024-07-12 LAB
INR PPP: 2.6 RATIO
QUALITY CONTROL: YES

## 2024-07-12 PROCEDURE — 85610 PROTHROMBIN TIME: CPT

## 2024-07-12 PROCEDURE — 99211 OFF/OP EST MAY X REQ PHY/QHP: CPT

## 2024-07-12 PROCEDURE — 36416 COLLJ CAPILLARY BLOOD SPEC: CPT

## 2024-07-13 DIAGNOSIS — Z79.01 LONG TERM (CURRENT) USE OF ANTICOAGULANTS: ICD-10-CM

## 2024-07-13 DIAGNOSIS — Z95.2 PRESENCE OF PROSTHETIC HEART VALVE: ICD-10-CM

## 2024-08-09 ENCOUNTER — APPOINTMENT (OUTPATIENT)
Dept: MEDICATION MANAGEMENT | Facility: CLINIC | Age: 24
End: 2024-08-09

## 2024-08-09 ENCOUNTER — OUTPATIENT (OUTPATIENT)
Dept: OUTPATIENT SERVICES | Facility: HOSPITAL | Age: 24
LOS: 1 days | End: 2024-08-09
Payer: MEDICARE

## 2024-08-09 DIAGNOSIS — Z51.81 ENCOUNTER FOR THERAPEUTIC DRUG LEVEL MONITORING: ICD-10-CM

## 2024-08-09 DIAGNOSIS — Z95.2 PRESENCE OF PROSTHETIC HEART VALVE: Chronic | ICD-10-CM

## 2024-08-09 DIAGNOSIS — Z79.01 LONG TERM (CURRENT) USE OF ANTICOAGULANTS: ICD-10-CM

## 2024-08-09 DIAGNOSIS — Q21.2 ATRIOVENTRICULAR SEPTAL DEFECT: Chronic | ICD-10-CM

## 2024-08-09 PROCEDURE — 85610 PROTHROMBIN TIME: CPT

## 2024-08-09 PROCEDURE — 36416 COLLJ CAPILLARY BLOOD SPEC: CPT

## 2024-08-09 PROCEDURE — 99211 OFF/OP EST MAY X REQ PHY/QHP: CPT

## 2024-08-10 DIAGNOSIS — Z51.81 ENCOUNTER FOR THERAPEUTIC DRUG LEVEL MONITORING: ICD-10-CM

## 2024-08-10 DIAGNOSIS — Z79.01 LONG TERM (CURRENT) USE OF ANTICOAGULANTS: ICD-10-CM

## 2024-08-23 ENCOUNTER — RX RENEWAL (OUTPATIENT)
Age: 24
End: 2024-08-23

## 2024-09-13 ENCOUNTER — OUTPATIENT (OUTPATIENT)
Dept: OUTPATIENT SERVICES | Facility: HOSPITAL | Age: 24
LOS: 1 days | End: 2024-09-13
Payer: MEDICARE

## 2024-09-13 ENCOUNTER — APPOINTMENT (OUTPATIENT)
Dept: MEDICATION MANAGEMENT | Facility: CLINIC | Age: 24
End: 2024-09-13

## 2024-09-13 DIAGNOSIS — Z51.81 ENCOUNTER FOR THERAPEUTIC DRUG LEVEL MONITORING: ICD-10-CM

## 2024-09-13 DIAGNOSIS — Z79.01 LONG TERM (CURRENT) USE OF ANTICOAGULANTS: ICD-10-CM

## 2024-09-13 DIAGNOSIS — Z95.2 PRESENCE OF PROSTHETIC HEART VALVE: Chronic | ICD-10-CM

## 2024-09-13 LAB
INR PPP: 1.8 RATIO
POCT-PROTHROMBIN TIME: 21.3 SECS
QUALITY CONTROL: YES

## 2024-09-13 PROCEDURE — 99211 OFF/OP EST MAY X REQ PHY/QHP: CPT

## 2024-09-13 PROCEDURE — 36416 COLLJ CAPILLARY BLOOD SPEC: CPT

## 2024-09-13 PROCEDURE — 85610 PROTHROMBIN TIME: CPT

## 2024-09-14 DIAGNOSIS — Z79.01 LONG TERM (CURRENT) USE OF ANTICOAGULANTS: ICD-10-CM

## 2024-09-14 DIAGNOSIS — Z51.81 ENCOUNTER FOR THERAPEUTIC DRUG LEVEL MONITORING: ICD-10-CM

## 2024-10-11 ENCOUNTER — OUTPATIENT (OUTPATIENT)
Dept: OUTPATIENT SERVICES | Facility: HOSPITAL | Age: 24
LOS: 1 days | End: 2024-10-11
Payer: MEDICARE

## 2024-10-11 ENCOUNTER — APPOINTMENT (OUTPATIENT)
Dept: MEDICATION MANAGEMENT | Facility: CLINIC | Age: 24
End: 2024-10-11

## 2024-10-11 DIAGNOSIS — Z79.01 LONG TERM (CURRENT) USE OF ANTICOAGULANTS: ICD-10-CM

## 2024-10-11 DIAGNOSIS — Z95.2 PRESENCE OF PROSTHETIC HEART VALVE: Chronic | ICD-10-CM

## 2024-10-11 DIAGNOSIS — Q21.2 ATRIOVENTRICULAR SEPTAL DEFECT: Chronic | ICD-10-CM

## 2024-10-11 DIAGNOSIS — Z51.81 ENCOUNTER FOR THERAPEUTIC DRUG LEVEL MONITORING: ICD-10-CM

## 2024-10-11 LAB
INR PPP: 2.5 RATIO
POCT-PROTHROMBIN TIME: 30.2 SECS
QUALITY CONTROL: YES

## 2024-10-11 PROCEDURE — 36416 COLLJ CAPILLARY BLOOD SPEC: CPT

## 2024-10-11 PROCEDURE — 99211 OFF/OP EST MAY X REQ PHY/QHP: CPT

## 2024-10-11 PROCEDURE — 85610 PROTHROMBIN TIME: CPT

## 2024-10-12 DIAGNOSIS — Z79.01 LONG TERM (CURRENT) USE OF ANTICOAGULANTS: ICD-10-CM

## 2024-10-12 DIAGNOSIS — Z51.81 ENCOUNTER FOR THERAPEUTIC DRUG LEVEL MONITORING: ICD-10-CM

## 2024-11-08 ENCOUNTER — OUTPATIENT (OUTPATIENT)
Dept: OUTPATIENT SERVICES | Facility: HOSPITAL | Age: 24
LOS: 1 days | End: 2024-11-08
Payer: MEDICARE

## 2024-11-08 ENCOUNTER — APPOINTMENT (OUTPATIENT)
Dept: MEDICATION MANAGEMENT | Facility: CLINIC | Age: 24
End: 2024-11-08

## 2024-11-08 DIAGNOSIS — Z79.01 LONG TERM (CURRENT) USE OF ANTICOAGULANTS: ICD-10-CM

## 2024-11-08 DIAGNOSIS — Z95.2 PRESENCE OF PROSTHETIC HEART VALVE: Chronic | ICD-10-CM

## 2024-11-08 DIAGNOSIS — Z51.81 ENCOUNTER FOR THERAPEUTIC DRUG LEVEL MONITORING: ICD-10-CM

## 2024-11-08 DIAGNOSIS — Q21.2 ATRIOVENTRICULAR SEPTAL DEFECT: Chronic | ICD-10-CM

## 2024-11-08 LAB
INR PPP: 2.3 RATIO
POCT-PROTHROMBIN TIME: 27.4 SECS
QUALITY CONTROL: YES

## 2024-11-08 PROCEDURE — 99211 OFF/OP EST MAY X REQ PHY/QHP: CPT

## 2024-11-08 PROCEDURE — 85610 PROTHROMBIN TIME: CPT

## 2024-11-08 PROCEDURE — 36416 COLLJ CAPILLARY BLOOD SPEC: CPT

## 2024-11-09 DIAGNOSIS — Z79.01 LONG TERM (CURRENT) USE OF ANTICOAGULANTS: ICD-10-CM

## 2024-11-09 DIAGNOSIS — Z51.81 ENCOUNTER FOR THERAPEUTIC DRUG LEVEL MONITORING: ICD-10-CM

## 2024-11-11 NOTE — ED PROVIDER NOTE - PROGRESS NOTE DETAILS
MRI lumbar spine is ordered call [phone] to schedule. Once MRI is completed call pain clinic to schedule telemed follow [phone].  Leave message where and when the MRI was done.   
called in Bactrim DS twice a day x7 days to [facility] [address] [facility], PCP already called in Keflex TID x7 days

## 2024-12-06 ENCOUNTER — APPOINTMENT (OUTPATIENT)
Dept: MEDICATION MANAGEMENT | Facility: CLINIC | Age: 24
End: 2024-12-06

## 2024-12-06 ENCOUNTER — OUTPATIENT (OUTPATIENT)
Dept: OUTPATIENT SERVICES | Facility: HOSPITAL | Age: 24
LOS: 1 days | End: 2024-12-06
Payer: MEDICARE

## 2024-12-06 DIAGNOSIS — Z79.01 LONG TERM (CURRENT) USE OF ANTICOAGULANTS: ICD-10-CM

## 2024-12-06 DIAGNOSIS — Q21.2 ATRIOVENTRICULAR SEPTAL DEFECT: Chronic | ICD-10-CM

## 2024-12-06 DIAGNOSIS — Z95.2 PRESENCE OF PROSTHETIC HEART VALVE: Chronic | ICD-10-CM

## 2024-12-06 DIAGNOSIS — Z51.81 ENCOUNTER FOR THERAPEUTIC DRUG LEVEL MONITORING: ICD-10-CM

## 2024-12-06 LAB
INR PPP: 2.5 RATIO
POCT-PROTHROMBIN TIME: 29.8 SECS
QUALITY CONTROL: YES

## 2024-12-06 PROCEDURE — 99211 OFF/OP EST MAY X REQ PHY/QHP: CPT

## 2024-12-06 PROCEDURE — 85610 PROTHROMBIN TIME: CPT

## 2024-12-06 PROCEDURE — 36416 COLLJ CAPILLARY BLOOD SPEC: CPT

## 2024-12-07 DIAGNOSIS — Z51.81 ENCOUNTER FOR THERAPEUTIC DRUG LEVEL MONITORING: ICD-10-CM

## 2024-12-07 DIAGNOSIS — Z79.01 LONG TERM (CURRENT) USE OF ANTICOAGULANTS: ICD-10-CM

## 2025-01-03 ENCOUNTER — APPOINTMENT (OUTPATIENT)
Dept: MEDICATION MANAGEMENT | Facility: CLINIC | Age: 25
End: 2025-01-03

## 2025-01-03 ENCOUNTER — OUTPATIENT (OUTPATIENT)
Dept: OUTPATIENT SERVICES | Facility: HOSPITAL | Age: 25
LOS: 1 days | End: 2025-01-03
Payer: MEDICARE

## 2025-01-03 DIAGNOSIS — Z79.01 LONG TERM (CURRENT) USE OF ANTICOAGULANTS: ICD-10-CM

## 2025-01-03 DIAGNOSIS — Z51.81 ENCOUNTER FOR THERAPEUTIC DRUG LEVEL MONITORING: ICD-10-CM

## 2025-01-03 DIAGNOSIS — Z95.2 PRESENCE OF PROSTHETIC HEART VALVE: Chronic | ICD-10-CM

## 2025-01-03 DIAGNOSIS — Q21.2 ATRIOVENTRICULAR SEPTAL DEFECT: Chronic | ICD-10-CM

## 2025-01-03 LAB
INR PPP: 3.1 RATIO
POCT-PROTHROMBIN TIME: 37.2 SECS
QUALITY CONTROL: YES

## 2025-01-03 PROCEDURE — 36416 COLLJ CAPILLARY BLOOD SPEC: CPT

## 2025-01-03 PROCEDURE — 85610 PROTHROMBIN TIME: CPT

## 2025-01-03 PROCEDURE — 99211 OFF/OP EST MAY X REQ PHY/QHP: CPT

## 2025-01-04 DIAGNOSIS — Z79.01 LONG TERM (CURRENT) USE OF ANTICOAGULANTS: ICD-10-CM

## 2025-01-04 DIAGNOSIS — Z51.81 ENCOUNTER FOR THERAPEUTIC DRUG LEVEL MONITORING: ICD-10-CM

## 2025-01-31 ENCOUNTER — OUTPATIENT (OUTPATIENT)
Dept: OUTPATIENT SERVICES | Facility: HOSPITAL | Age: 25
LOS: 1 days | End: 2025-01-31
Payer: MEDICARE

## 2025-01-31 ENCOUNTER — APPOINTMENT (OUTPATIENT)
Dept: MEDICATION MANAGEMENT | Facility: CLINIC | Age: 25
End: 2025-01-31

## 2025-01-31 DIAGNOSIS — Z79.01 LONG TERM (CURRENT) USE OF ANTICOAGULANTS: ICD-10-CM

## 2025-01-31 DIAGNOSIS — Q21.2 ATRIOVENTRICULAR SEPTAL DEFECT: Chronic | ICD-10-CM

## 2025-01-31 DIAGNOSIS — I27.82 CHRONIC PULMONARY EMBOLISM: ICD-10-CM

## 2025-01-31 DIAGNOSIS — Z95.2 PRESENCE OF PROSTHETIC HEART VALVE: Chronic | ICD-10-CM

## 2025-01-31 LAB
INR PPP: 2.1 RATIO
POCT-PROTHROMBIN TIME: 24.3 SECS
QUALITY CONTROL: YES

## 2025-01-31 PROCEDURE — 85610 PROTHROMBIN TIME: CPT

## 2025-01-31 PROCEDURE — 99211 OFF/OP EST MAY X REQ PHY/QHP: CPT

## 2025-01-31 PROCEDURE — 36416 COLLJ CAPILLARY BLOOD SPEC: CPT

## 2025-02-01 DIAGNOSIS — Z79.01 LONG TERM (CURRENT) USE OF ANTICOAGULANTS: ICD-10-CM

## 2025-02-01 DIAGNOSIS — I27.82 CHRONIC PULMONARY EMBOLISM: ICD-10-CM

## 2025-02-28 ENCOUNTER — OUTPATIENT (OUTPATIENT)
Dept: OUTPATIENT SERVICES | Facility: HOSPITAL | Age: 25
LOS: 1 days | End: 2025-02-28
Payer: MEDICARE

## 2025-02-28 ENCOUNTER — APPOINTMENT (OUTPATIENT)
Dept: MEDICATION MANAGEMENT | Facility: CLINIC | Age: 25
End: 2025-02-28

## 2025-02-28 DIAGNOSIS — Z51.81 ENCOUNTER FOR THERAPEUTIC DRUG LEVEL MONITORING: ICD-10-CM

## 2025-02-28 DIAGNOSIS — Z95.2 PRESENCE OF PROSTHETIC HEART VALVE: Chronic | ICD-10-CM

## 2025-02-28 DIAGNOSIS — Q21.2 ATRIOVENTRICULAR SEPTAL DEFECT: Chronic | ICD-10-CM

## 2025-02-28 DIAGNOSIS — Z79.01 LONG TERM (CURRENT) USE OF ANTICOAGULANTS: ICD-10-CM

## 2025-02-28 LAB
INR PPP: 1.9 RATIO
POCT-PROTHROMBIN TIME: 22.3 SECS
QUALITY CONTROL: YES

## 2025-02-28 PROCEDURE — 99211 OFF/OP EST MAY X REQ PHY/QHP: CPT

## 2025-02-28 PROCEDURE — 36416 COLLJ CAPILLARY BLOOD SPEC: CPT

## 2025-02-28 PROCEDURE — 85610 PROTHROMBIN TIME: CPT

## 2025-03-01 DIAGNOSIS — Z79.01 LONG TERM (CURRENT) USE OF ANTICOAGULANTS: ICD-10-CM

## 2025-03-01 DIAGNOSIS — Z51.81 ENCOUNTER FOR THERAPEUTIC DRUG LEVEL MONITORING: ICD-10-CM

## 2025-03-28 ENCOUNTER — OUTPATIENT (OUTPATIENT)
Dept: OUTPATIENT SERVICES | Facility: HOSPITAL | Age: 25
LOS: 1 days | End: 2025-03-28
Payer: MEDICARE

## 2025-03-28 ENCOUNTER — APPOINTMENT (OUTPATIENT)
Dept: MEDICATION MANAGEMENT | Facility: CLINIC | Age: 25
End: 2025-03-28

## 2025-03-28 DIAGNOSIS — Z51.81 ENCOUNTER FOR THERAPEUTIC DRUG LEVEL MONITORING: ICD-10-CM

## 2025-03-28 DIAGNOSIS — Z79.01 LONG TERM (CURRENT) USE OF ANTICOAGULANTS: ICD-10-CM

## 2025-03-28 DIAGNOSIS — Z95.2 PRESENCE OF PROSTHETIC HEART VALVE: Chronic | ICD-10-CM

## 2025-03-28 LAB
INR PPP: 2 RATIO
POCT-PROTHROMBIN TIME: 23.8 SECS
QUALITY CONTROL: YES

## 2025-03-28 PROCEDURE — 36416 COLLJ CAPILLARY BLOOD SPEC: CPT

## 2025-03-28 PROCEDURE — 99211 OFF/OP EST MAY X REQ PHY/QHP: CPT

## 2025-03-28 PROCEDURE — 85610 PROTHROMBIN TIME: CPT

## 2025-03-29 DIAGNOSIS — Z51.81 ENCOUNTER FOR THERAPEUTIC DRUG LEVEL MONITORING: ICD-10-CM

## 2025-03-29 DIAGNOSIS — Z79.01 LONG TERM (CURRENT) USE OF ANTICOAGULANTS: ICD-10-CM

## 2025-04-18 ENCOUNTER — APPOINTMENT (OUTPATIENT)
Dept: MEDICATION MANAGEMENT | Facility: CLINIC | Age: 25
End: 2025-04-18

## 2025-04-18 ENCOUNTER — OUTPATIENT (OUTPATIENT)
Dept: OUTPATIENT SERVICES | Facility: HOSPITAL | Age: 25
LOS: 1 days | End: 2025-04-18
Payer: MEDICARE

## 2025-04-18 DIAGNOSIS — Z79.01 LONG TERM (CURRENT) USE OF ANTICOAGULANTS: ICD-10-CM

## 2025-04-18 DIAGNOSIS — Q21.2 ATRIOVENTRICULAR SEPTAL DEFECT: Chronic | ICD-10-CM

## 2025-04-18 DIAGNOSIS — Z51.81 ENCOUNTER FOR THERAPEUTIC DRUG LEVEL MONITORING: ICD-10-CM

## 2025-04-18 LAB
INR PPP: 2.2 RATIO
POCT-PROTHROMBIN TIME: 26.1 SECS
QUALITY CONTROL: YES

## 2025-04-18 PROCEDURE — 99211 OFF/OP EST MAY X REQ PHY/QHP: CPT

## 2025-04-18 PROCEDURE — 85610 PROTHROMBIN TIME: CPT

## 2025-04-18 PROCEDURE — 36416 COLLJ CAPILLARY BLOOD SPEC: CPT

## 2025-04-19 DIAGNOSIS — Z51.81 ENCOUNTER FOR THERAPEUTIC DRUG LEVEL MONITORING: ICD-10-CM

## 2025-04-19 DIAGNOSIS — Z79.01 LONG TERM (CURRENT) USE OF ANTICOAGULANTS: ICD-10-CM

## 2025-05-09 ENCOUNTER — OUTPATIENT (OUTPATIENT)
Dept: OUTPATIENT SERVICES | Facility: HOSPITAL | Age: 25
LOS: 1 days | End: 2025-05-09
Payer: MEDICARE

## 2025-05-09 ENCOUNTER — APPOINTMENT (OUTPATIENT)
Dept: MEDICATION MANAGEMENT | Facility: CLINIC | Age: 25
End: 2025-05-09

## 2025-05-09 DIAGNOSIS — Z95.2 PRESENCE OF PROSTHETIC HEART VALVE: Chronic | ICD-10-CM

## 2025-05-09 DIAGNOSIS — Z79.01 LONG TERM (CURRENT) USE OF ANTICOAGULANTS: ICD-10-CM

## 2025-05-09 DIAGNOSIS — Z51.81 ENCOUNTER FOR THERAPEUTIC DRUG LEVEL MONITORING: ICD-10-CM

## 2025-05-09 LAB
INR PPP: 3.1 RATIO
POCT-PROTHROMBIN TIME: 37.1 SECS
QUALITY CONTROL: YES

## 2025-05-09 PROCEDURE — 99211 OFF/OP EST MAY X REQ PHY/QHP: CPT

## 2025-05-09 PROCEDURE — 85610 PROTHROMBIN TIME: CPT

## 2025-05-09 PROCEDURE — 36416 COLLJ CAPILLARY BLOOD SPEC: CPT

## 2025-05-10 DIAGNOSIS — Z79.01 LONG TERM (CURRENT) USE OF ANTICOAGULANTS: ICD-10-CM

## 2025-05-10 DIAGNOSIS — Z51.81 ENCOUNTER FOR THERAPEUTIC DRUG LEVEL MONITORING: ICD-10-CM

## 2025-05-30 ENCOUNTER — OUTPATIENT (OUTPATIENT)
Dept: OUTPATIENT SERVICES | Facility: HOSPITAL | Age: 25
LOS: 1 days | End: 2025-05-30
Payer: MEDICARE

## 2025-05-30 ENCOUNTER — APPOINTMENT (OUTPATIENT)
Dept: MEDICATION MANAGEMENT | Facility: CLINIC | Age: 25
End: 2025-05-30

## 2025-05-30 DIAGNOSIS — Z95.2 PRESENCE OF PROSTHETIC HEART VALVE: ICD-10-CM

## 2025-05-30 DIAGNOSIS — Z79.01 LONG TERM (CURRENT) USE OF ANTICOAGULANTS: ICD-10-CM

## 2025-05-30 DIAGNOSIS — Z95.2 PRESENCE OF PROSTHETIC HEART VALVE: Chronic | ICD-10-CM

## 2025-05-30 LAB
INR PPP: 2.8 RATIO
POCT-PROTHROMBIN TIME: 34.1 SECS
QUALITY CONTROL: YES

## 2025-05-30 PROCEDURE — 85610 PROTHROMBIN TIME: CPT

## 2025-05-30 PROCEDURE — 99211 OFF/OP EST MAY X REQ PHY/QHP: CPT

## 2025-05-30 PROCEDURE — 36416 COLLJ CAPILLARY BLOOD SPEC: CPT

## 2025-05-31 DIAGNOSIS — Z79.01 LONG TERM (CURRENT) USE OF ANTICOAGULANTS: ICD-10-CM

## 2025-06-27 ENCOUNTER — OUTPATIENT (OUTPATIENT)
Dept: OUTPATIENT SERVICES | Facility: HOSPITAL | Age: 25
LOS: 1 days | End: 2025-06-27
Payer: MEDICARE

## 2025-06-27 ENCOUNTER — APPOINTMENT (OUTPATIENT)
Dept: MEDICATION MANAGEMENT | Facility: CLINIC | Age: 25
End: 2025-06-27

## 2025-06-27 DIAGNOSIS — Z79.01 LONG TERM (CURRENT) USE OF ANTICOAGULANTS: ICD-10-CM

## 2025-06-27 DIAGNOSIS — Q21.2 ATRIOVENTRICULAR SEPTAL DEFECT: Chronic | ICD-10-CM

## 2025-06-27 DIAGNOSIS — Z95.2 PRESENCE OF PROSTHETIC HEART VALVE: Chronic | ICD-10-CM

## 2025-06-27 LAB
INR PPP: 2.7 RATIO
POCT-PROTHROMBIN TIME: 32.9 SECS
QUALITY CONTROL: YES

## 2025-06-27 PROCEDURE — 36416 COLLJ CAPILLARY BLOOD SPEC: CPT

## 2025-06-27 PROCEDURE — 99211 OFF/OP EST MAY X REQ PHY/QHP: CPT

## 2025-06-27 PROCEDURE — 85610 PROTHROMBIN TIME: CPT

## 2025-06-28 DIAGNOSIS — Z95.2 PRESENCE OF PROSTHETIC HEART VALVE: ICD-10-CM

## 2025-06-28 DIAGNOSIS — Z79.01 LONG TERM (CURRENT) USE OF ANTICOAGULANTS: ICD-10-CM

## 2025-07-25 ENCOUNTER — OUTPATIENT (OUTPATIENT)
Dept: OUTPATIENT SERVICES | Facility: HOSPITAL | Age: 25
LOS: 1 days | End: 2025-07-25
Payer: MEDICARE

## 2025-07-25 ENCOUNTER — APPOINTMENT (OUTPATIENT)
Dept: MEDICATION MANAGEMENT | Facility: CLINIC | Age: 25
End: 2025-07-25

## 2025-07-25 DIAGNOSIS — Z79.01 LONG TERM (CURRENT) USE OF ANTICOAGULANTS: ICD-10-CM

## 2025-07-25 DIAGNOSIS — Q21.2 ATRIOVENTRICULAR SEPTAL DEFECT: Chronic | ICD-10-CM

## 2025-07-25 DIAGNOSIS — Z95.2 PRESENCE OF PROSTHETIC HEART VALVE: Chronic | ICD-10-CM

## 2025-07-25 DIAGNOSIS — Z95.2 PRESENCE OF PROSTHETIC HEART VALVE: ICD-10-CM

## 2025-07-25 PROCEDURE — 36416 COLLJ CAPILLARY BLOOD SPEC: CPT

## 2025-07-25 PROCEDURE — 99211 OFF/OP EST MAY X REQ PHY/QHP: CPT

## 2025-07-25 PROCEDURE — 85610 PROTHROMBIN TIME: CPT

## 2025-07-26 DIAGNOSIS — Z79.01 LONG TERM (CURRENT) USE OF ANTICOAGULANTS: ICD-10-CM

## 2025-07-26 DIAGNOSIS — Z95.2 PRESENCE OF PROSTHETIC HEART VALVE: ICD-10-CM

## 2025-07-29 LAB
INR PPP: 3.2 RATIO
POCT-PROTHROMBIN TIME: 39 SECS
QUALITY CONTROL: YES

## 2025-08-01 ENCOUNTER — RX RENEWAL (OUTPATIENT)
Age: 25
End: 2025-08-01

## 2025-08-22 ENCOUNTER — OUTPATIENT (OUTPATIENT)
Dept: OUTPATIENT SERVICES | Facility: HOSPITAL | Age: 25
LOS: 1 days | End: 2025-08-22
Payer: MEDICARE

## 2025-08-22 ENCOUNTER — APPOINTMENT (OUTPATIENT)
Dept: MEDICATION MANAGEMENT | Facility: CLINIC | Age: 25
End: 2025-08-22

## 2025-08-22 DIAGNOSIS — Z95.2 PRESENCE OF PROSTHETIC HEART VALVE: Chronic | ICD-10-CM

## 2025-08-22 DIAGNOSIS — Z95.2 PRESENCE OF PROSTHETIC HEART VALVE: ICD-10-CM

## 2025-08-22 DIAGNOSIS — Q21.2 ATRIOVENTRICULAR SEPTAL DEFECT: Chronic | ICD-10-CM

## 2025-08-22 DIAGNOSIS — Z79.01 LONG TERM (CURRENT) USE OF ANTICOAGULANTS: ICD-10-CM

## 2025-08-22 LAB
INR PPP: 2.7 RATIO
POCT-PROTHROMBIN TIME: 33 SECS
QUALITY CONTROL: YES

## 2025-08-22 PROCEDURE — 99212 OFFICE O/P EST SF 10 MIN: CPT

## 2025-08-22 PROCEDURE — 36416 COLLJ CAPILLARY BLOOD SPEC: CPT

## 2025-08-22 PROCEDURE — 85610 PROTHROMBIN TIME: CPT

## 2025-08-23 DIAGNOSIS — Z79.01 LONG TERM (CURRENT) USE OF ANTICOAGULANTS: ICD-10-CM

## 2025-08-23 DIAGNOSIS — Z95.2 PRESENCE OF PROSTHETIC HEART VALVE: ICD-10-CM

## 2025-09-19 ENCOUNTER — APPOINTMENT (OUTPATIENT)
Dept: MEDICATION MANAGEMENT | Facility: CLINIC | Age: 25
End: 2025-09-19

## 2025-09-19 LAB
INR PPP: 3.9 RATIO
POCT-PROTHROMBIN TIME: 46.4 SECS
QUALITY CONTROL: YES